# Patient Record
Sex: MALE | Race: WHITE | NOT HISPANIC OR LATINO | Employment: OTHER | URBAN - METROPOLITAN AREA
[De-identification: names, ages, dates, MRNs, and addresses within clinical notes are randomized per-mention and may not be internally consistent; named-entity substitution may affect disease eponyms.]

---

## 2022-03-16 ENCOUNTER — TELEPHONE (OUTPATIENT)
Dept: MEDICAL GROUP | Facility: MEDICAL CENTER | Age: 67
End: 2022-03-16

## 2022-04-03 SDOH — HEALTH STABILITY: MENTAL HEALTH
STRESS IS WHEN SOMEONE FEELS TENSE, NERVOUS, ANXIOUS, OR CAN'T SLEEP AT NIGHT BECAUSE THEIR MIND IS TROUBLED. HOW STRESSED ARE YOU?: NOT AT ALL

## 2022-04-03 SDOH — HEALTH STABILITY: PHYSICAL HEALTH: ON AVERAGE, HOW MANY DAYS PER WEEK DO YOU ENGAGE IN MODERATE TO STRENUOUS EXERCISE (LIKE A BRISK WALK)?: 5 DAYS

## 2022-04-03 SDOH — HEALTH STABILITY: PHYSICAL HEALTH: ON AVERAGE, HOW MANY MINUTES DO YOU ENGAGE IN EXERCISE AT THIS LEVEL?: 40 MIN

## 2022-04-03 SDOH — ECONOMIC STABILITY: HOUSING INSECURITY: IN THE LAST 12 MONTHS, HOW MANY PLACES HAVE YOU LIVED?: 2

## 2022-04-03 SDOH — ECONOMIC STABILITY: FOOD INSECURITY: WITHIN THE PAST 12 MONTHS, THE FOOD YOU BOUGHT JUST DIDN'T LAST AND YOU DIDN'T HAVE MONEY TO GET MORE.: NEVER TRUE

## 2022-04-03 SDOH — ECONOMIC STABILITY: HOUSING INSECURITY
IN THE LAST 12 MONTHS, WAS THERE A TIME WHEN YOU DID NOT HAVE A STEADY PLACE TO SLEEP OR SLEPT IN A SHELTER (INCLUDING NOW)?: NO

## 2022-04-03 SDOH — ECONOMIC STABILITY: INCOME INSECURITY: IN THE LAST 12 MONTHS, WAS THERE A TIME WHEN YOU WERE NOT ABLE TO PAY THE MORTGAGE OR RENT ON TIME?: NO

## 2022-04-03 SDOH — ECONOMIC STABILITY: TRANSPORTATION INSECURITY
IN THE PAST 12 MONTHS, HAS LACK OF RELIABLE TRANSPORTATION KEPT YOU FROM MEDICAL APPOINTMENTS, MEETINGS, WORK OR FROM GETTING THINGS NEEDED FOR DAILY LIVING?: NO

## 2022-04-03 SDOH — ECONOMIC STABILITY: TRANSPORTATION INSECURITY
IN THE PAST 12 MONTHS, HAS THE LACK OF TRANSPORTATION KEPT YOU FROM MEDICAL APPOINTMENTS OR FROM GETTING MEDICATIONS?: NO

## 2022-04-03 SDOH — ECONOMIC STABILITY: FOOD INSECURITY: WITHIN THE PAST 12 MONTHS, YOU WORRIED THAT YOUR FOOD WOULD RUN OUT BEFORE YOU GOT MONEY TO BUY MORE.: NEVER TRUE

## 2022-04-03 SDOH — ECONOMIC STABILITY: TRANSPORTATION INSECURITY
IN THE PAST 12 MONTHS, HAS LACK OF TRANSPORTATION KEPT YOU FROM MEETINGS, WORK, OR FROM GETTING THINGS NEEDED FOR DAILY LIVING?: NO

## 2022-04-03 SDOH — ECONOMIC STABILITY: INCOME INSECURITY: HOW HARD IS IT FOR YOU TO PAY FOR THE VERY BASICS LIKE FOOD, HOUSING, MEDICAL CARE, AND HEATING?: NOT HARD AT ALL

## 2022-04-03 ASSESSMENT — SOCIAL DETERMINANTS OF HEALTH (SDOH)
HOW OFTEN DO YOU ATTENT MEETINGS OF THE CLUB OR ORGANIZATION YOU BELONG TO?: NEVER
HOW OFTEN DO YOU GET TOGETHER WITH FRIENDS OR RELATIVES?: ONCE A WEEK
HOW OFTEN DO YOU ATTEND CHURCH OR RELIGIOUS SERVICES?: NEVER
HOW MANY DRINKS CONTAINING ALCOHOL DO YOU HAVE ON A TYPICAL DAY WHEN YOU ARE DRINKING: 1 OR 2
HOW OFTEN DO YOU ATTENT MEETINGS OF THE CLUB OR ORGANIZATION YOU BELONG TO?: NEVER
HOW OFTEN DO YOU HAVE A DRINK CONTAINING ALCOHOL: 2-4 TIMES A MONTH
IN A TYPICAL WEEK, HOW MANY TIMES DO YOU TALK ON THE PHONE WITH FAMILY, FRIENDS, OR NEIGHBORS?: MORE THAN THREE TIMES A WEEK
DO YOU BELONG TO ANY CLUBS OR ORGANIZATIONS SUCH AS CHURCH GROUPS UNIONS, FRATERNAL OR ATHLETIC GROUPS, OR SCHOOL GROUPS?: NO
HOW OFTEN DO YOU ATTEND CHURCH OR RELIGIOUS SERVICES?: NEVER
DO YOU BELONG TO ANY CLUBS OR ORGANIZATIONS SUCH AS CHURCH GROUPS UNIONS, FRATERNAL OR ATHLETIC GROUPS, OR SCHOOL GROUPS?: NO
HOW OFTEN DO YOU HAVE SIX OR MORE DRINKS ON ONE OCCASION: LESS THAN MONTHLY
IN A TYPICAL WEEK, HOW MANY TIMES DO YOU TALK ON THE PHONE WITH FAMILY, FRIENDS, OR NEIGHBORS?: MORE THAN THREE TIMES A WEEK
HOW OFTEN DO YOU GET TOGETHER WITH FRIENDS OR RELATIVES?: ONCE A WEEK
HOW HARD IS IT FOR YOU TO PAY FOR THE VERY BASICS LIKE FOOD, HOUSING, MEDICAL CARE, AND HEATING?: NOT HARD AT ALL
WITHIN THE PAST 12 MONTHS, YOU WORRIED THAT YOUR FOOD WOULD RUN OUT BEFORE YOU GOT THE MONEY TO BUY MORE: NEVER TRUE

## 2022-04-03 ASSESSMENT — LIFESTYLE VARIABLES
HOW OFTEN DO YOU HAVE SIX OR MORE DRINKS ON ONE OCCASION: LESS THAN MONTHLY
HOW MANY STANDARD DRINKS CONTAINING ALCOHOL DO YOU HAVE ON A TYPICAL DAY: 1 OR 2
HOW OFTEN DO YOU HAVE A DRINK CONTAINING ALCOHOL: 2-4 TIMES A MONTH

## 2022-04-06 ENCOUNTER — OFFICE VISIT (OUTPATIENT)
Dept: MEDICAL GROUP | Facility: MEDICAL CENTER | Age: 67
End: 2022-04-06
Payer: MEDICARE

## 2022-04-06 VITALS
HEART RATE: 61 BPM | DIASTOLIC BLOOD PRESSURE: 78 MMHG | HEIGHT: 71 IN | RESPIRATION RATE: 16 BRPM | BODY MASS INDEX: 28.09 KG/M2 | OXYGEN SATURATION: 96 % | SYSTOLIC BLOOD PRESSURE: 126 MMHG | WEIGHT: 200.62 LBS | TEMPERATURE: 97.7 F

## 2022-04-06 DIAGNOSIS — Z12.5 SCREENING FOR PROSTATE CANCER: ICD-10-CM

## 2022-04-06 DIAGNOSIS — G47.33 OBSTRUCTIVE SLEEP APNEA SYNDROME: ICD-10-CM

## 2022-04-06 DIAGNOSIS — F41.1 GAD (GENERALIZED ANXIETY DISORDER): ICD-10-CM

## 2022-04-06 DIAGNOSIS — R91.8 LUNG NODULES: ICD-10-CM

## 2022-04-06 DIAGNOSIS — I10 ESSENTIAL HYPERTENSION: ICD-10-CM

## 2022-04-06 DIAGNOSIS — D36.9 TUBULAR ADENOMA: ICD-10-CM

## 2022-04-06 DIAGNOSIS — Z85.828 HISTORY OF BASAL CELL CARCINOMA: ICD-10-CM

## 2022-04-06 DIAGNOSIS — K21.9 GASTROESOPHAGEAL REFLUX DISEASE WITHOUT ESOPHAGITIS: ICD-10-CM

## 2022-04-06 DIAGNOSIS — E11.9 TYPE 2 DIABETES MELLITUS WITHOUT COMPLICATION, WITHOUT LONG-TERM CURRENT USE OF INSULIN (HCC): ICD-10-CM

## 2022-04-06 DIAGNOSIS — Z11.59 NEED FOR HEPATITIS C SCREENING TEST: ICD-10-CM

## 2022-04-06 PROCEDURE — 99204 OFFICE O/P NEW MOD 45 MIN: CPT | Performed by: FAMILY MEDICINE

## 2022-04-06 RX ORDER — AMLODIPINE BESYLATE 5 MG/1
TABLET ORAL
COMMUNITY
End: 2022-04-22 | Stop reason: SDUPTHER

## 2022-04-06 RX ORDER — METOPROLOL SUCCINATE 50 MG/1
TABLET, EXTENDED RELEASE ORAL
COMMUNITY
Start: 2022-01-21 | End: 2022-04-22 | Stop reason: SDUPTHER

## 2022-04-06 RX ORDER — HYDROCORTISONE AND ACETIC ACID 20.75; 10.375 MG/ML; MG/ML
2 SOLUTION AURICULAR (OTIC) 2 TIMES DAILY
COMMUNITY
End: 2022-04-06

## 2022-04-06 RX ORDER — LISINOPRIL 40 MG/1
TABLET ORAL
COMMUNITY
Start: 2022-01-21 | End: 2022-04-22 | Stop reason: SDUPTHER

## 2022-04-06 RX ORDER — OMEPRAZOLE 20 MG/1
CAPSULE, DELAYED RELEASE ORAL
COMMUNITY
Start: 2022-01-21 | End: 2022-04-22 | Stop reason: SDUPTHER

## 2022-04-06 RX ORDER — SOY ISOFLAVONE 40 MG
1000 TABLET ORAL
COMMUNITY

## 2022-04-06 ASSESSMENT — ENCOUNTER SYMPTOMS
NAUSEA: 0
PALPITATIONS: 0
NERVOUS/ANXIOUS: 0
SENSORY CHANGE: 0
SPUTUM PRODUCTION: 0
WEIGHT LOSS: 0
SHORTNESS OF BREATH: 0
HEADACHES: 0
ABDOMINAL PAIN: 0
FOCAL WEAKNESS: 0
CONSTIPATION: 0
DIZZINESS: 0
VOMITING: 0
FEVER: 0
DIARRHEA: 0
HEMOPTYSIS: 0
MYALGIAS: 0
CHILLS: 0
COUGH: 0
DEPRESSION: 0
WHEEZING: 0

## 2022-04-06 ASSESSMENT — PATIENT HEALTH QUESTIONNAIRE - PHQ9: CLINICAL INTERPRETATION OF PHQ2 SCORE: 0

## 2022-04-06 NOTE — ASSESSMENT & PLAN NOTE
Chronic health problem, stable, recommended to start tapering off omeprazole.  Discussed side effects of PPIs.  Patient.  If symptoms recur then we can restart him on PPIs.  Can take H2 blockers like Pepcid also.

## 2022-04-06 NOTE — ASSESSMENT & PLAN NOTE
Request records from previous provider regarding CT scans, will order neck CT in August.  Counseled regarding smoking cessation.  Discussed about nicotine patches.  Will let me know when he is ready to quit smoking then will prescribe nicotine patches.  Counseled regarding changing habits associated with smoking cessation.  Patient reports understanding.

## 2022-04-06 NOTE — ASSESSMENT & PLAN NOTE
Chronic health problem, recheck labs to assess control.  Continue Metformin 500 mg twice daily.  We will get his labs and will start him on statin depending upon his lab results.  Follow-up with ophthalmology for eye exam and severe glasses also and has not been checked recently for eye exam.  Foot exam done today, normal.

## 2022-04-06 NOTE — PROGRESS NOTES
FAMILY MEDICINE VISIT                                                               Chief complaint::Diagnoses of Essential hypertension, Type 2 diabetes mellitus without complication, without long-term current use of insulin (HCC), Gastroesophageal reflux disease without esophagitis, CECILIA (generalized anxiety disorder), Need for hepatitis C screening test, Tubular adenoma, Lung nodules, History of basal cell carcinoma, Obstructive sleep apnea syndrome, and Screening for prostate cancer were pertinent to this visit.    History of present illness: Raf Spear is a 66 y.o. male, a very pleasant patient who presented to establish care.    Problem   Essential Hypertension    Has history of hypertension, currently on lisinopril 40 mg daily, Lodine 5 mg daily and metoprolol 50 mg daily.  Blood pressure was initially elevated today, repeat came back at 126/78.  Doing well with these medications.  No chest pain, palpitations, shortness of breath, lower extremity swelling.     Type 2 Diabetes Mellitus Without Complication, Without Long-Term Current Use of Insulin (Hcc)    Recently diagnosed with diabetes per patient.  A1c at 6.7.  On Metformin 500 mg twice daily.    Monofilament testing with a 10 gram force: sensation intact: intact bilaterally  Visual Inspection: Feet without maceration, ulcers, fissures.  Pedal pulses: intact bilaterally     Gastroesophageal Reflux Disease Without Esophagitis    Has history of reflux symptoms on omeprazole for 10 years.  Reports that have never tried to taper off this medication.     Cecilia (Generalized Anxiety Disorder)    Is currently on Zoloft 50 mg daily, doing well with this medication.  No side effects.     Tubular Adenoma    Recent colonoscopy in March 2022 showed 5 polyps, 3 of them were tubular adenoma, repeat recommended in 3 years     Lung Nodules    Reports that he has lung nodules, get CT scan in August every year.  Will request records from previous provider regarding these  imagings.  He smokes 4 to 5 cigarettes a day and has been smoking for 45 years.  He tried previously Wellbutrin.  He was prescribed Chantix, reports that has not taken that medication.     History of Basal Cell Carcinoma    Reports history of basal cell carcinoma over face, seeing dermatology currently.     Obstructive Sleep Apnea Syndrome    Has obstructive sleep apnea, uses CPAP machine, doing well       Review of systems:     Review of Systems   Constitutional: Negative for chills, fever, malaise/fatigue and weight loss.   HENT: Positive for congestion and hearing loss. Negative for ear discharge and ear pain.    Respiratory: Negative for cough, hemoptysis, sputum production, shortness of breath and wheezing.    Cardiovascular: Negative for chest pain, palpitations and leg swelling.   Gastrointestinal: Negative for abdominal pain, constipation, diarrhea, nausea and vomiting.   Musculoskeletal: Negative for joint pain and myalgias.   Skin: Negative for rash.   Neurological: Negative for dizziness, sensory change, focal weakness and headaches.   Psychiatric/Behavioral: Negative for depression. The patient is not nervous/anxious.         Medications and Allergies:     Current Outpatient Medications   Medication Sig Dispense Refill   • amLODIPine (NORVASC) 5 MG Tab      • lisinopril (PRINIVIL) 40 MG tablet TAKE 1 TABLET BY MOUTH DAILY FOR HIGH BLOOD PRESSURE DISORDER     • metFORMIN (GLUCOPHAGE) 500 MG Tab TAKE 1 TABLET BY MOUTH TWICE DAILY WITH BREAKFAST AND DINNER GENERIC EQUIVALENT FOR GLUCOPHAGE     • metoprolol SR (TOPROL XL) 50 MG TABLET SR 24 HR TAKE 1 TABLET BY MOUTH DAILY FOR HIGH BLOOD PRESSURE DISORDER     • omeprazole (PRILOSEC) 20 MG delayed-release capsule TAKE 1 CAPSULE BY MOUTH DAILY FOR GASTROESOPHAGEAL REFLUX DISEASE     • sertraline (ZOLOFT) 50 MG Tab      • ASPIRIN LOW DOSE PO Take 5 mg by mouth.     • Methylsulfonylmethane (MSM) 1000 MG Cap Take 1,000 mg by mouth.     • Multiple Vitamin  "(MULTI-VITAMIN DAILY PO) Take  by mouth.       No current facility-administered medications for this visit.          Vitals:    /78   Pulse 61   Temp 36.5 °C (97.7 °F)   Resp 16   Ht 1.803 m (5' 11\")   Wt 91 kg (200 lb 9.9 oz)   SpO2 96%  Body mass index is 27.98 kg/m².    Physical Exam:     Physical Exam  Constitutional:       General: He is not in acute distress.  HENT:      Head: Normocephalic and atraumatic.      Right Ear: External ear normal.      Left Ear: External ear normal.      Ears:      Comments: Wearing hearing aids  Eyes:      Conjunctiva/sclera: Conjunctivae normal.   Cardiovascular:      Rate and Rhythm: Normal rate and regular rhythm.      Heart sounds: Normal heart sounds. No murmur heard.    No friction rub. No gallop.   Pulmonary:      Effort: Pulmonary effort is normal. No respiratory distress.      Breath sounds: Normal breath sounds. No wheezing or rales.   Musculoskeletal:         General: No deformity.      Cervical back: Neck supple.   Neurological:      Mental Status: He is alert.      Gait: Gait is intact.   Psychiatric:         Mood and Affect: Mood and affect normal.         Judgment: Judgment normal.            Assessment/Plan:    Problem List Items Addressed This Visit     Essential hypertension     Chronic health problem, stable, continue same medication regimen.         Relevant Medications    amLODIPine (NORVASC) 5 MG Tab    lisinopril (PRINIVIL) 40 MG tablet    metoprolol SR (TOPROL XL) 50 MG TABLET SR 24 HR    Other Relevant Orders    Comp Metabolic Panel    CBC WITH DIFFERENTIAL    CECILIA (generalized anxiety disorder)     Chronic health problem, stable, continue same medication regimen.         Relevant Medications    sertraline (ZOLOFT) 50 MG Tab    Gastroesophageal reflux disease without esophagitis     Chronic health problem, stable, recommended to start tapering off omeprazole.  Discussed side effects of PPIs.  Patient.  If symptoms recur then we can restart him " on PPIs.  Can take H2 blockers like Pepcid also.         Relevant Medications    omeprazole (PRILOSEC) 20 MG delayed-release capsule    History of basal cell carcinoma     Continue to follow-up with dermatology for follow-ups         Lung nodules     Request records from previous provider regarding CT scans, will order neck CT in August.  Counseled regarding smoking cessation.  Discussed about nicotine patches.  Will let me know when he is ready to quit smoking then will prescribe nicotine patches.  Counseled regarding changing habits associated with smoking cessation.  Patient reports understanding.         Obstructive sleep apnea syndrome     Continue CPAP machine         Tubular adenoma     Follow-up with gastroenterology for repeat colonoscopy in 3 years         Type 2 diabetes mellitus without complication, without long-term current use of insulin (HCC)     Chronic health problem, recheck labs to assess control.  Continue Metformin 500 mg twice daily.  We will get his labs and will start him on statin depending upon his lab results.  Follow-up with ophthalmology for eye exam and severe glasses also and has not been checked recently for eye exam.  Foot exam done today, normal.         Relevant Medications    metFORMIN (GLUCOPHAGE) 500 MG Tab    Other Relevant Orders    HEMOGLOBIN A1C    Lipid Profile    MICROALBUMIN CREAT RATIO URINE    Referral to Ophthalmology    VITAMIN B12      Other Visit Diagnoses     Need for hepatitis C screening test        Relevant Orders    HEP C VIRUS ANTIBODY    Screening for prostate cancer        Relevant Orders    PROSTATE SPECIFIC AG SCREENING           Please note that this dictation was created using voice recognition software. I have made every reasonable attempt to correct obvious errors, but I expect that there are errors of grammar and possibly content that I did not discover before finalizing the note.    Follow up in 3 months for annual wellness visit and lab  follow-up.

## 2022-04-22 ENCOUNTER — PATIENT MESSAGE (OUTPATIENT)
Dept: MEDICAL GROUP | Facility: MEDICAL CENTER | Age: 67
End: 2022-04-22
Payer: MEDICARE

## 2022-04-22 RX ORDER — OMEPRAZOLE 20 MG/1
CAPSULE, DELAYED RELEASE ORAL
Qty: 90 CAPSULE | Refills: 3 | Status: SHIPPED | OUTPATIENT
Start: 2022-04-22 | End: 2023-02-03

## 2022-04-22 RX ORDER — METOPROLOL SUCCINATE 50 MG/1
TABLET, EXTENDED RELEASE ORAL
Qty: 90 TABLET | Refills: 3 | Status: SHIPPED | OUTPATIENT
Start: 2022-04-22 | End: 2023-02-03

## 2022-04-22 RX ORDER — LISINOPRIL 40 MG/1
TABLET ORAL
Qty: 90 TABLET | Refills: 3 | Status: SHIPPED | OUTPATIENT
Start: 2022-04-22 | End: 2022-11-11

## 2022-04-22 RX ORDER — AMLODIPINE BESYLATE 5 MG/1
5 TABLET ORAL DAILY
Qty: 90 TABLET | Refills: 3 | Status: SHIPPED | OUTPATIENT
Start: 2022-04-22 | End: 2023-02-03

## 2022-05-11 ENCOUNTER — PATIENT MESSAGE (OUTPATIENT)
Dept: MEDICAL GROUP | Facility: MEDICAL CENTER | Age: 67
End: 2022-05-11
Payer: MEDICARE

## 2022-05-11 DIAGNOSIS — G47.33 OBSTRUCTIVE SLEEP APNEA SYNDROME: ICD-10-CM

## 2022-06-28 ENCOUNTER — HOSPITAL ENCOUNTER (OUTPATIENT)
Dept: LAB | Facility: MEDICAL CENTER | Age: 67
End: 2022-06-28
Attending: FAMILY MEDICINE
Payer: MEDICARE

## 2022-06-28 DIAGNOSIS — E11.9 TYPE 2 DIABETES MELLITUS WITHOUT COMPLICATION, WITHOUT LONG-TERM CURRENT USE OF INSULIN (HCC): ICD-10-CM

## 2022-06-28 DIAGNOSIS — Z11.59 NEED FOR HEPATITIS C SCREENING TEST: ICD-10-CM

## 2022-06-28 DIAGNOSIS — Z12.5 SCREENING FOR PROSTATE CANCER: ICD-10-CM

## 2022-06-28 DIAGNOSIS — I10 ESSENTIAL HYPERTENSION: ICD-10-CM

## 2022-06-28 LAB
ALBUMIN SERPL BCP-MCNC: 4.6 G/DL (ref 3.2–4.9)
ALBUMIN/GLOB SERPL: 1.6 G/DL
ALP SERPL-CCNC: 66 U/L (ref 30–99)
ALT SERPL-CCNC: 70 U/L (ref 2–50)
ANION GAP SERPL CALC-SCNC: 11 MMOL/L (ref 7–16)
AST SERPL-CCNC: 30 U/L (ref 12–45)
BASOPHILS # BLD AUTO: 1.1 % (ref 0–1.8)
BASOPHILS # BLD: 0.08 K/UL (ref 0–0.12)
BILIRUB SERPL-MCNC: 0.3 MG/DL (ref 0.1–1.5)
BUN SERPL-MCNC: 21 MG/DL (ref 8–22)
CALCIUM SERPL-MCNC: 9.5 MG/DL (ref 8.5–10.5)
CHLORIDE SERPL-SCNC: 103 MMOL/L (ref 96–112)
CHOLEST SERPL-MCNC: 114 MG/DL (ref 100–199)
CO2 SERPL-SCNC: 25 MMOL/L (ref 20–33)
CREAT SERPL-MCNC: 0.82 MG/DL (ref 0.5–1.4)
CREAT UR-MCNC: 158.31 MG/DL
EOSINOPHIL # BLD AUTO: 0.23 K/UL (ref 0–0.51)
EOSINOPHIL NFR BLD: 3.2 % (ref 0–6.9)
ERYTHROCYTE [DISTWIDTH] IN BLOOD BY AUTOMATED COUNT: 48.2 FL (ref 35.9–50)
EST. AVERAGE GLUCOSE BLD GHB EST-MCNC: 146 MG/DL
FASTING STATUS PATIENT QL REPORTED: NORMAL
GFR SERPLBLD CREATININE-BSD FMLA CKD-EPI: 95 ML/MIN/1.73 M 2
GLOBULIN SER CALC-MCNC: 2.8 G/DL (ref 1.9–3.5)
GLUCOSE SERPL-MCNC: 128 MG/DL (ref 65–99)
HBA1C MFR BLD: 6.7 % (ref 4–5.6)
HCT VFR BLD AUTO: 44.1 % (ref 42–52)
HCV AB SER QL: NORMAL
HDLC SERPL-MCNC: 33 MG/DL
HGB BLD-MCNC: 13.3 G/DL (ref 14–18)
IMM GRANULOCYTES # BLD AUTO: 0.02 K/UL (ref 0–0.11)
IMM GRANULOCYTES NFR BLD AUTO: 0.3 % (ref 0–0.9)
LDLC SERPL CALC-MCNC: 52 MG/DL
LYMPHOCYTES # BLD AUTO: 1.24 K/UL (ref 1–4.8)
LYMPHOCYTES NFR BLD: 17.2 % (ref 22–41)
MCH RBC QN AUTO: 22.9 PG (ref 27–33)
MCHC RBC AUTO-ENTMCNC: 30.2 G/DL (ref 33.7–35.3)
MCV RBC AUTO: 75.9 FL (ref 81.4–97.8)
MICROALBUMIN UR-MCNC: 151.5 MG/DL
MICROALBUMIN/CREAT UR: 957 MG/G (ref 0–30)
MONOCYTES # BLD AUTO: 0.77 K/UL (ref 0–0.85)
MONOCYTES NFR BLD AUTO: 10.7 % (ref 0–13.4)
NEUTROPHILS # BLD AUTO: 4.86 K/UL (ref 1.82–7.42)
NEUTROPHILS NFR BLD: 67.5 % (ref 44–72)
NRBC # BLD AUTO: 0 K/UL
NRBC BLD-RTO: 0 /100 WBC
PLATELET # BLD AUTO: 299 K/UL (ref 164–446)
PMV BLD AUTO: 10.2 FL (ref 9–12.9)
POTASSIUM SERPL-SCNC: 4.6 MMOL/L (ref 3.6–5.5)
PROT SERPL-MCNC: 7.4 G/DL (ref 6–8.2)
PSA SERPL-MCNC: 2.23 NG/ML (ref 0–4)
RBC # BLD AUTO: 5.81 M/UL (ref 4.7–6.1)
SODIUM SERPL-SCNC: 139 MMOL/L (ref 135–145)
TRIGL SERPL-MCNC: 145 MG/DL (ref 0–149)
VIT B12 SERPL-MCNC: 822 PG/ML (ref 211–911)
WBC # BLD AUTO: 7.2 K/UL (ref 4.8–10.8)

## 2022-06-28 PROCEDURE — 82607 VITAMIN B-12: CPT

## 2022-06-28 PROCEDURE — 36415 COLL VENOUS BLD VENIPUNCTURE: CPT

## 2022-06-28 PROCEDURE — 86803 HEPATITIS C AB TEST: CPT

## 2022-06-28 PROCEDURE — 80061 LIPID PANEL: CPT

## 2022-06-28 PROCEDURE — 82043 UR ALBUMIN QUANTITATIVE: CPT

## 2022-06-28 PROCEDURE — 83036 HEMOGLOBIN GLYCOSYLATED A1C: CPT

## 2022-06-28 PROCEDURE — 82570 ASSAY OF URINE CREATININE: CPT

## 2022-06-28 PROCEDURE — 85025 COMPLETE CBC W/AUTO DIFF WBC: CPT

## 2022-06-28 PROCEDURE — 84153 ASSAY OF PSA TOTAL: CPT

## 2022-06-28 PROCEDURE — 80053 COMPREHEN METABOLIC PANEL: CPT

## 2022-07-06 ENCOUNTER — PATIENT MESSAGE (OUTPATIENT)
Dept: MEDICAL GROUP | Facility: MEDICAL CENTER | Age: 67
End: 2022-07-06

## 2022-07-06 ENCOUNTER — APPOINTMENT (OUTPATIENT)
Dept: MEDICAL GROUP | Facility: MEDICAL CENTER | Age: 67
End: 2022-07-06
Payer: MEDICARE

## 2022-07-19 ENCOUNTER — OFFICE VISIT (OUTPATIENT)
Dept: MEDICAL GROUP | Facility: MEDICAL CENTER | Age: 67
End: 2022-07-19
Payer: MEDICARE

## 2022-07-19 VITALS
DIASTOLIC BLOOD PRESSURE: 70 MMHG | RESPIRATION RATE: 16 BRPM | HEART RATE: 70 BPM | TEMPERATURE: 97.7 F | WEIGHT: 196.21 LBS | HEIGHT: 71 IN | SYSTOLIC BLOOD PRESSURE: 130 MMHG | OXYGEN SATURATION: 97 % | BODY MASS INDEX: 27.47 KG/M2

## 2022-07-19 DIAGNOSIS — I10 ESSENTIAL HYPERTENSION: ICD-10-CM

## 2022-07-19 DIAGNOSIS — E11.9 TYPE 2 DIABETES MELLITUS WITHOUT COMPLICATION, WITHOUT LONG-TERM CURRENT USE OF INSULIN (HCC): ICD-10-CM

## 2022-07-19 DIAGNOSIS — D50.9 MICROCYTIC ANEMIA: ICD-10-CM

## 2022-07-19 DIAGNOSIS — Z13.6 SCREENING FOR AAA (ABDOMINAL AORTIC ANEURYSM): ICD-10-CM

## 2022-07-19 DIAGNOSIS — F41.1 GAD (GENERALIZED ANXIETY DISORDER): ICD-10-CM

## 2022-07-19 DIAGNOSIS — R91.8 LUNG NODULES: ICD-10-CM

## 2022-07-19 DIAGNOSIS — H91.93 BILATERAL HEARING LOSS, UNSPECIFIED HEARING LOSS TYPE: ICD-10-CM

## 2022-07-19 DIAGNOSIS — C44.311 BASAL CELL CARCINOMA (BCC) OF SKIN OF NOSE: ICD-10-CM

## 2022-07-19 DIAGNOSIS — E11.29 TYPE 2 DIABETES MELLITUS WITH MICROALBUMINURIA, WITHOUT LONG-TERM CURRENT USE OF INSULIN (HCC): ICD-10-CM

## 2022-07-19 DIAGNOSIS — F17.200 CURRENT SMOKER: ICD-10-CM

## 2022-07-19 DIAGNOSIS — R80.9 TYPE 2 DIABETES MELLITUS WITH MICROALBUMINURIA, WITHOUT LONG-TERM CURRENT USE OF INSULIN (HCC): ICD-10-CM

## 2022-07-19 PROBLEM — K57.30 COLON, DIVERTICULOSIS: Status: ACTIVE | Noted: 2018-02-15

## 2022-07-19 PROBLEM — Z85.828 HISTORY OF BASAL CELL CARCINOMA: Status: RESOLVED | Noted: 2022-04-06 | Resolved: 2022-07-19

## 2022-07-19 PROBLEM — C44.91 BCC (BASAL CELL CARCINOMA OF SKIN): Status: ACTIVE | Noted: 2022-07-19

## 2022-07-19 PROBLEM — K80.20 CALCULUS OF GALLBLADDER WITHOUT CHOLECYSTITIS WITHOUT OBSTRUCTION: Status: ACTIVE | Noted: 2017-11-30

## 2022-07-19 PROCEDURE — 99214 OFFICE O/P EST MOD 30 MIN: CPT | Performed by: FAMILY MEDICINE

## 2022-07-19 RX ORDER — VITAMIN B COMPLEX
1000 TABLET ORAL DAILY
COMMUNITY

## 2022-07-19 RX ORDER — SODIUM PHOSPHATE,MONO-DIBASIC 19G-7G/118
500 ENEMA (ML) RECTAL
COMMUNITY

## 2022-07-19 RX ORDER — FERROUS SULFATE 325(65) MG
325 TABLET ORAL DAILY
Qty: 90 TABLET | Refills: 1 | Status: SHIPPED | OUTPATIENT
Start: 2022-07-19 | End: 2022-10-17

## 2022-07-19 RX ORDER — CHLORAL HYDRATE 500 MG
1000 CAPSULE ORAL
COMMUNITY

## 2022-07-19 ASSESSMENT — ENCOUNTER SYMPTOMS
CHILLS: 0
CONSTIPATION: 0
BLOOD IN STOOL: 0
HEARTBURN: 0
ABDOMINAL PAIN: 0
PALPITATIONS: 0
DIARRHEA: 0
VOMITING: 0
NAUSEA: 0
FEVER: 0

## 2022-07-19 ASSESSMENT — FIBROSIS 4 INDEX: FIB4 SCORE: 0.83

## 2022-07-19 NOTE — PROGRESS NOTES
FAMILY MEDICINE VISIT                                                               Chief complaint::Diagnoses of Lung nodules, Type 2 diabetes mellitus without complication, without long-term current use of insulin (HCC), Essential hypertension, Type 2 diabetes mellitus with microalbuminuria, without long-term current use of insulin (HCC), Basal cell carcinoma (BCC) of skin of nose, Current smoker, Screening for AAA (abdominal aortic aneurysm), Bilateral hearing loss, unspecified hearing loss type, CECILIA (generalized anxiety disorder), and Microcytic anemia were pertinent to this visit.    History of present illness: Raf Spear is a 67 y.o. male who presented for lab follow-up.    Problem   Bcc (Basal Cell Carcinoma of Skin)    Has history of basal cell carcinoma, was previously followed with dermatology every 6 months     Bilateral Hearing Loss    He wears hearing aids, reports that he still cannot hear properly and he has to raise volume of hearing aids, would like to get checked     Microcytic Anemia    Recent labs showed hemoglobin low as well as other counts low indicated of microcytic anemia.  His last colonoscopy was in March 2022 he had 5 polyps and repeat recommended in 3 years.  He reports that he feels tired and fatigue.  He denies any blood in stools, blood in urine     Essential Hypertension    Has history of hypertension, currently on lisinopril 40 mg daily, amlodipine 5 mg daily and metoprolol 50 mg daily.  Blood pressure was initially elevated today, repeat came back at 130/70 doing well with these medications.  No chest pain, palpitations, shortness of breath, lower extremity swelling.     Type 2 Diabetes Mellitus With Microalbuminuria, Without Long-Term Current Use of Insulin ([facility] [place])    Recently diagnosed with diabetes per patient.  A1c at 6.7.  On Metformin 500 mg twice daily.  Scheduled with ophthalmology in August.    Lab Results   Component Value Date/Time    HBA1C 6.7 (H) 06/28/2022 09:39 AM          Abdon (Generalized Anxiety Disorder)    Is currently on Zoloft 50 mg daily, doing well with this medication.  No side effects.     Lung Nodules    CT chest on 09/2021 showed Few groundglass opacities up to 10 mm better seen. Noncontrast chest CT in 6 months recommended. Solid pulmonary nodules of less than 6 mm not significantly changed as above. Few areas of mild mucus plugging    He smokes 4 to 5 cigarettes a day and has been smoking for 45 years.  He tried previously Wellbutrin.  He was prescribed Chantix, reports that has not taken that medication.  He reports that he read that Chantix is associated with pancreatic cancer.     Colon, Diverticulosis    Formatting of this note might be different from the original.  Mild-moderate sigmoid colon.     Calculus of Gallbladder Without Cholecystitis Without Obstruction   Current Smoker    He smokes 4 to 5 cigarettes a day and has been smoking for 45 years.  He tried previously Wellbutrin.  He was prescribed Chantix, reports that has not taken that medication.  He reports that he read that Chantix is associated with pancreatic cancer.     History of Basal Cell Carcinoma (Resolved)    Reports history of basal cell carcinoma over face, seeing dermatology currently.           Review of systems:     Review of Systems   Constitutional: Positive for malaise/fatigue. Negative for chills and fever.   Cardiovascular: Negative for chest pain, palpitations and leg swelling.   Gastrointestinal: Negative for abdominal pain, blood in stool, constipation, diarrhea, heartburn, nausea and vomiting.        Medications and Allergies:     Current Outpatient Medications   Medication Sig Dispense Refill   • Omega-3 Fatty Acids (FISH OIL) 1000 MG Cap capsule Take 1,000 mg by mouth 3 times a day with meals.     • Zinc Sulfate (ZINC-220 PO) Take  by mouth.     • Ascorbic Acid (VITAMIN C CR) 1000 MG Tab CR Take  by mouth.     • vitamin D3 (CHOLECALCIFEROL) 1000 Unit (25 mcg) Tab Take 1,000  "Units by mouth every day.     • glucosamine Sulfate 500 MG Cap Take 500 mg by mouth 3 times a day with meals.     • metFORMIN (GLUCOPHAGE) 500 MG Tab Take 2 Tablets by mouth 2 times a day with meals. 360 Tablet 3   • sertraline (ZOLOFT) 50 MG Tab Take 1 Tablet by mouth every day. 90 Tablet 3   • ferrous sulfate 325 (65 Fe) MG tablet Take 1 Tablet by mouth every day. 90 Tablet 1   • varenicline (CHANTIX CYN) 0.5 MG X 11 & 1 MG X 42 tablet Take 0.5 mg tablet by mouth daily for 3 days, then take 0.5 mg tablet by mouth 2 times daily from day 4 through day 7, then take 1 mg tablet by mouth twice daily 56 Each 3   • amLODIPine (NORVASC) 5 MG Tab Take 1 Tablet by mouth every day. For high blood pressure 90 Tablet 3   • lisinopril (PRINIVIL) 40 MG tablet TAKE 1 TABLET BY MOUTH DAILY FOR HIGH BLOOD PRESSURE DISORDER 90 Tablet 3   • metoprolol SR (TOPROL XL) 50 MG TABLET SR 24 HR TAKE 1 TABLET BY MOUTH DAILY FOR HIGH BLOOD PRESSURE DISORDER 90 Tablet 3   • omeprazole (PRILOSEC) 20 MG delayed-release capsule TAKE 1 CAPSULE BY MOUTH DAILY FOR GASTROESOPHAGEAL REFLUX DISEASE 90 Capsule 3   • ASPIRIN LOW DOSE PO Take 5 mg by mouth.     • Methylsulfonylmethane (MSM) 1000 MG Cap Take 1,000 mg by mouth.     • Multiple Vitamin (MULTI-VITAMIN DAILY PO) Take  by mouth.       No current facility-administered medications for this visit.          Vitals:    /70 (BP Location: Left arm, Patient Position: Sitting, BP Cuff Size: Adult)   Pulse 70   Temp 36.5 °C (97.7 °F)   Resp 16   Ht 1.803 m (5' 11\")   Wt 89 kg (196 lb 3.4 oz)   SpO2 97%  Body mass index is 27.37 kg/m².    Physical Exam:     Physical Exam  Constitutional:       General: He is not in acute distress.  HENT:      Head: Normocephalic and atraumatic.   Eyes:      Conjunctiva/sclera: Conjunctivae normal.   Cardiovascular:      Rate and Rhythm: Normal rate.   Pulmonary:      Effort: Pulmonary effort is normal. No respiratory distress.   Musculoskeletal:         " General: No deformity.      Cervical back: Neck supple.   Skin:     Findings: No rash.   Neurological:      Mental Status: He is alert.      Gait: Gait is intact.   Psychiatric:         Mood and Affect: Mood and affect normal.         Judgment: Judgment normal.          Labs:  I reviewed with patient recent labs resulted on 6/28/2022    Assessment/Plan:    Problem List Items Addressed This Visit     BCC (basal cell carcinoma of skin)     Referral to dermatology to establish care for follow-up for monitoring for basal cell carcinoma           Relevant Orders    Referral to Dermatology    Bilateral hearing loss     Chronic health problem, referral to audiology for retesting for hearing test           Relevant Orders    Referral to Audiology    Current smoker     Chronic ongoing problem, discussed with patient about smoking cessation, counseled regarding this.  He is agreeable to start medication.  Prescribed varenicline generic prescription.  Discussed Chantix is off market now           Relevant Medications    varenicline (CHANTIX CYN) 0.5 MG X 11 & 1 MG X 42 tablet    Essential hypertension     Chronic health problem, stable, continue same medication regimen.           CECILIA (generalized anxiety disorder)     Chronic health problem, stable, continue Zoloft 50 mg daily           Relevant Medications    sertraline (ZOLOFT) 50 MG Tab    Lung nodules     Chronic health problem, ordered repeat CT scan for follow-up for lung nodules.  Discussed that Chantix is off the market now.  Prescribed varenicline generic prescription           Relevant Orders    CT-CHEST, HIGH RESOLUTION LUNG    Microcytic anemia     New health problem, start iron supplementation 1 tablet daily, check CBC, iron and ferritin level with next blood work           Relevant Medications    ferrous sulfate 325 (65 Fe) MG tablet    Other Relevant Orders    CBC WITHOUT DIFFERENTIAL    IRON/TOTAL IRON BIND    FERRITIN    Type 2 diabetes mellitus with  microalbuminuria, without long-term current use of insulin (HCC)     Chronic health problem, his A1c remains at 6.7 even after starting metformin.  Increase metformin to 1000 mg twice daily.  Recheck labs with next blood work.  Has microalbuminuria on urine test, on lisinopril.  Follow-up with ophthalmology for eye exam           Relevant Medications    metFORMIN (GLUCOPHAGE) 500 MG Tab      Other Visit Diagnoses     Screening for AAA (abdominal aortic aneurysm)        Relevant Orders    US-ABDOMINAL AORTA W/O DOPPLER           Please note that this dictation was created using voice recognition software. I have made every reasonable attempt to correct obvious errors, but I expect that there are errors of grammar and possibly content that I did not discover before finalizing the note.    Follow up in 3 months for lab follow-up.

## 2022-07-19 NOTE — ASSESSMENT & PLAN NOTE
Chronic health problem, ordered repeat CT scan for follow-up for lung nodules.  Discussed that Chantix is off the market now.  Prescribed varenicline generic prescription

## 2022-07-19 NOTE — ASSESSMENT & PLAN NOTE
New health problem, start iron supplementation 1 tablet daily, check CBC, iron and ferritin level with next blood work

## 2022-07-19 NOTE — ASSESSMENT & PLAN NOTE
Chronic health problem, his A1c remains at 6.7 even after starting metformin.  Increase metformin to 1000 mg twice daily.  Recheck labs with next blood work.  Has microalbuminuria on urine test, on lisinopril.  Follow-up with ophthalmology for eye exam

## 2022-08-05 ENCOUNTER — OFFICE VISIT (OUTPATIENT)
Dept: SLEEP MEDICINE | Facility: MEDICAL CENTER | Age: 67
End: 2022-08-05
Payer: MEDICARE

## 2022-08-05 VITALS
HEIGHT: 71 IN | OXYGEN SATURATION: 95 % | WEIGHT: 202 LBS | SYSTOLIC BLOOD PRESSURE: 124 MMHG | RESPIRATION RATE: 16 BRPM | HEART RATE: 74 BPM | DIASTOLIC BLOOD PRESSURE: 84 MMHG | BODY MASS INDEX: 28.28 KG/M2

## 2022-08-05 DIAGNOSIS — G47.33 OSA ON CPAP: Primary | ICD-10-CM

## 2022-08-05 DIAGNOSIS — G47.00 FREQUENT NOCTURNAL AWAKENING: ICD-10-CM

## 2022-08-05 PROCEDURE — 99203 OFFICE O/P NEW LOW 30 MIN: CPT | Performed by: STUDENT IN AN ORGANIZED HEALTH CARE EDUCATION/TRAINING PROGRAM

## 2022-08-05 ASSESSMENT — FIBROSIS 4 INDEX: FIB4 SCORE: 0.83

## 2022-08-05 NOTE — PROGRESS NOTES
[facility] Consult Note     Date: 8/5/2022 / Time: 9:48 AM      Thank you for requesting a sleep medicine consultation on Raf Spear at the sleep center. Presents today with the chief complaints of establishing care for obstructive sleep apnea. He is referred by Ryan Carranza M.D.  [id number]  [place] [id number] for evaluation and treatment of obstructive sleep apnea on CPAP.    HISTORY OF PRESENT ILLNESS:     Raf Spear is a 67 y.o. male with hypertension, hyperlipidemia, CECILIA, GERD, and obstructive sleep apnea on CPAP.  Presents to Sleep Clinic to establish care for obstructive sleep apnea.    He was diagnosed in [facility] he PSG split-night study on 2/27/2020.  PSG showed mild obstructive sleep apnea based on 4% criteria.  Minimal oxygen saturation during diagnostic portion of 89%.  Patient responded well to CPAP therapy.  It was recommended to be on a pressure of 8 cmH2O.  Patient was placed on auto CPAP with settings at 7-13.  With starting CPAP she found that his energy level improved.  He was sleeping more soundly and had less not drowsiness during the day.  Overall he is very happy with using his CPAP and does find his mask and pressures comfortable.    He has noticed that he still becomes tired around 1:59 in the afternoon on certain days.  Otherwise he currently has no complaints or concerns.  He is hoping to be established with a new DME company since moving to the [facility].    DME provider: Current does not have one locally.   Device: Airsense 10 prescribed beginning of 2021, on Medicare  Mask: fullface   Aerophagia: No   Snoring: No   Dry mouth: No   Leak: No   Skin irritation: No   Chin strap: No       As per supplemental questionnaire to be scanned or imported into chart:    [place] Sleepiness Score: 6    Sleep Schedule  Bedtime: Weekday & Weekend 11pm  Wake time: Weekday & Weekend 7am  Sleep-onset latency: 20-30 min  Awakenings from  "sleep: 1  Difficulty falling back asleep: None   Bedroom partner: yes  Naps: Yes, once every week or two.     DAYTIME SYMPTOMS:   Excessive daytime sleepiness: No   Daytime fatigue: No   Difficulty concentrating: No   Memory problems: Yes  Irritability:No   Work/school performance issues: No   Sleepiness with driving: No   Caffeine/stimulant use: Yes 2 cups coffee   Alcohol use:Yes, How Many? Once a week      SLEEP RELATED SYMPTOMS  On CPAP.   Snoring: No   Witnessed apnea or gasping/choking: No   Dry mouth or mouth breathing: No   Sweating: No   Teeth grinding/biting: No   Morning headaches: No   Refreshed Upon Awakening: Yes     SLEEP RELATED BEHAVIORS:  Parasomnias (walking, talking, eating, violence): No   Leg kicking: No   Restless legs - \"urge to move\": No   Nightmares: No  Recurrent: No   Dream enactment: No      NARCOLEPSY:  Cataplexy: No   Sleep paralysis: No   Sleep attacks: No   Hypnagogic/hypnopompic hallucinations: No     MEDICAL HISTORY  Past Medical History:   Diagnosis Date   • Daytime sleepiness    • Diabetes (HCC)    • [place] measles    • Hyperlipidemia    • Hypertension    • Influenza    • Mumps    • Snoring    • Wears glasses         SURGICAL HISTORY  Past Surgical History:   Procedure Laterality Date   • LAMINOTOMY     • LUMBAR DECOMPRESSION     • TONSILLECTOMY          FAMILY HISTORY  Family History   Problem Relation Age of Onset   • Cancer Mother         Lymphoma Leukemia   • Hypertension Father    • Cancer Father         Colon cancer    • Colon Cancer Father        SOCIAL HISTORY  Social History     Socioeconomic History   • Marital status:    • Highest education level: Some college, no degree   Tobacco Use   • Smoking status: Current Every Day Smoker     Packs/day: 0.30     Years: 50.00     Pack years: 15.00     Types: Cigarettes     Start date: 4/1/1976   • Smokeless tobacco: Never Used   • Tobacco comment: 4-5 cig/day   Vaping Use   • Vaping Use: Never used   Substance and Sexual " Activity   • Alcohol use: Yes     Alcohol/week: 1.2 oz     Types: 2 Cans of beer per week     Comment: Social   • Drug use: Never     Social Determinants of Health     Financial Resource Strain: Low Risk    • Difficulty of Paying Living Expenses: Not hard at all   Food Insecurity: No Food Insecurity   • Worried About Running Out of Food in the Last Year: Never true   • Ran Out of Food in the Last Year: Never true   Transportation Needs: No Transportation Needs   • Lack of Transportation (Medical): No   • Lack of Transportation (Non-Medical): No   Physical Activity: Sufficiently Active   • Days of Exercise per Week: 5 days   • Minutes of Exercise per Session: 40 min   Stress: No Stress Concern Present   • Feeling of Stress : Not at all   Social Connections: Moderately Isolated   • Frequency of Communication with Friends and Family: More than three times a week   • Frequency of Social Gatherings with Friends and Family: Once a week   • Attends Mormon Services: Never   • Active Member of Clubs or Organizations: No   • Attends Club or Organization Meetings: Never   • Marital Status:    Housing Stability: Low Risk    • Unable to Pay for Housing in the Last Year: No   • Number of Places Lived in the Last Year: 2   • Unstable Housing in the Last Year: No        Occupation: Retired     CURRENT MEDICATIONS  Current Outpatient Medications   Medication Sig Dispense Refill   • Omega-3 Fatty Acids (FISH OIL) 1000 MG Cap capsule Take 1,000 mg by mouth 3 times a day with meals.     • Zinc Sulfate (ZINC-220 PO) Take  by mouth.     • Ascorbic Acid (VITAMIN C CR) 1000 MG Tab CR Take  by mouth.     • vitamin D3 (CHOLECALCIFEROL) 1000 Unit (25 mcg) Tab Take 1,000 Units by mouth every day.     • glucosamine Sulfate 500 MG Cap Take 500 mg by mouth 3 times a day with meals.     • metFORMIN (GLUCOPHAGE) 500 MG Tab Take 2 Tablets by mouth 2 times a day with meals. 360 Tablet 3   • sertraline (ZOLOFT) 50 MG Tab Take 1 Tablet by  "mouth every day. 90 Tablet 3   • ferrous sulfate 325 (65 Fe) MG tablet Take 1 Tablet by mouth every day. 90 Tablet 1   • amLODIPine (NORVASC) 5 MG Tab Take 1 Tablet by mouth every day. For high blood pressure 90 Tablet 3   • lisinopril (PRINIVIL) 40 MG tablet TAKE 1 TABLET BY MOUTH DAILY FOR HIGH BLOOD PRESSURE DISORDER 90 Tablet 3   • metoprolol SR (TOPROL XL) 50 MG TABLET SR 24 HR TAKE 1 TABLET BY MOUTH DAILY FOR HIGH BLOOD PRESSURE DISORDER 90 Tablet 3   • omeprazole (PRILOSEC) 20 MG delayed-release capsule TAKE 1 CAPSULE BY MOUTH DAILY FOR GASTROESOPHAGEAL REFLUX DISEASE 90 Capsule 3   • ASPIRIN LOW DOSE PO Take 5 mg by mouth.     • Methylsulfonylmethane (MSM) 1000 MG Cap Take 1,000 mg by mouth.     • Multiple Vitamin (MULTI-VITAMIN DAILY PO) Take  by mouth.     • varenicline (CHANTIX CYN) 0.5 MG X 11 & 1 MG X 42 tablet Take 0.5 mg tablet by mouth daily for 3 days, then take 0.5 mg tablet by mouth 2 times daily from day 4 through day 7, then take 1 mg tablet by mouth twice daily (Patient not taking: Reported on 8/5/2022) 56 Each 3     No current facility-administered medications for this visit.       REVIEW OF SYSTEMS  Constitutional: Denies fevers, Denies weight changes  Ears/Nose/Throat/Mouth: Denies nasal congestion or sore throat   Cardiovascular: Denies chest pain  Respiratory: Denies shortness of breath, Denies cough  Gastrointestinal/Hepatic: Denies nausea, vomiting  Sleep: see HPI    Physical Examination:  Vitals/ General Appearance:   Weight/BMI: Body mass index is 28.57 kg/m².  /84 (BP Location: Left arm, Patient Position: Sitting, BP Cuff Size: Adult)   Pulse 74   Resp 16   Ht 1.791 m (5' 10.5\")   Wt 91.6 kg (202 lb)   SpO2 95%   Vitals:    08/05/22 0946   BP: 124/84   BP Location: Left arm   Patient Position: Sitting   BP Cuff Size: Adult   Pulse: 74   Resp: 16   SpO2: 95%   Weight: 91.6 kg (202 lb)   Height: 1.791 m (5' 10.5\")       Pt. is alert and oriented to time, place and person. " Cooperative and in no apparent distress.     Constitutional: Alert, no distress, well-groomed.  Skin: No rashes in visible areas.  Eye: Round. Conjunctiva clear, lids normal. No icterus.   ENT EXAM  Nasal alae/valves collapsible: No   Nasal septum deviation: Yes  Nasal turbinate hypertrophy: Left: Grade 1   Right: Grade 1  Hard palate narrow: No   Hard palate high: No   Soft palate/uvula (Mallampati score): 3  Tongue Scalloping: Yes  Retrognathia: No   Micrognathia: No   Cardiovascular:no murmus/gallops/rubs, normal S1 and S2 heart sounds, regular rate and rhythm  Pulmonary:Clear to auscultation, No wheezes, No crackles.  Neurologic:Awake, alert and oriented x 3, Normal age appropriate gait, No involuntary motions.  Extremities: No clubbing, cyanosis, or edema       ASSESSMENT AND PLAN   Raf Spear is a 67 y.o. male with hypertension, hyperlipidemia, CECILIA, GERD, and obstructive sleep apnea on CPAP.  Presents to Sleep Clinic to establish care for obstructive sleep apnea.    1.  Obstructive sleep apnea  Previous PSG split-night records are in media tab from December 27, 2020.  Compliance data reviewed showing 100% usage > 4hours in last 30  days. Average AHI 3.4 events/hour. 90-95% pressure 9.9 CWP. Pt continues to use and benefit from machine.   Current settings are 7-13 cmH2O    Office will work with him on setting up new DME in the area.  Given information regarding [employer] in [place].    PLAN:   -Order placed for mask and supplies   -Advised to reach out via MyChart with questions     Has been advised to continue the current  CPAP, clean equipment frequently, and get new mask and supplies as allowed by insurance and DME. Recommend an earlier appointment, if significant treatment barriers develop.    The risks of untreated sleep apnea were discussed with the patient at length. Patients with ANNE are at increased risk of cardiovascular disease including coronary artery disease, systemic arterial hypertension,  pulmonary arterial hypertension, cardiac arrythmias, and stroke. The patient was advised to avoid driving a motor vehicle when drowsy.    Positive airway pressure will favorably impact many of the adverse conditions and effects provoked by ANNE.    Have advised the patient to follow up with the appropriate healthcare practitioners for all other medical problems and issues.    Return in about 1 year (around 8/5/2023).    2.  Regarding treatment of other past medical problems and general health maintenance,  Pt is urged to follow up with PCP.      Please note portions of this record was created using voice recognition software. I have made every reasonable attempt to correct obvious errors, but I expect that there are errors of grammar and possibly content I did not discover before finalizing the note.

## 2022-08-17 ENCOUNTER — HOSPITAL ENCOUNTER (OUTPATIENT)
Facility: MEDICAL CENTER | Age: 67
End: 2022-08-17
Attending: DENTIST
Payer: MEDICARE

## 2022-08-17 PROCEDURE — 88305 TISSUE EXAM BY PATHOLOGIST: CPT

## 2022-08-18 LAB — PATHOLOGY CONSULT NOTE: NORMAL

## 2022-08-19 LAB — AMBIGUOUS DTTM AMBI4: NORMAL

## 2022-08-24 ENCOUNTER — HOSPITAL ENCOUNTER (OUTPATIENT)
Dept: RADIOLOGY | Facility: MEDICAL CENTER | Age: 67
End: 2022-08-24
Attending: FAMILY MEDICINE
Payer: MEDICARE

## 2022-08-24 DIAGNOSIS — R91.8 LUNG NODULES: ICD-10-CM

## 2022-08-24 DIAGNOSIS — Z13.6 SCREENING FOR AAA (ABDOMINAL AORTIC ANEURYSM): ICD-10-CM

## 2022-08-24 PROCEDURE — 71250 CT THORAX DX C-: CPT | Mod: MG

## 2022-08-24 PROCEDURE — 76775 US EXAM ABDO BACK WALL LIM: CPT

## 2022-09-01 ENCOUNTER — OFFICE VISIT (OUTPATIENT)
Dept: DERMATOLOGY | Facility: IMAGING CENTER | Age: 67
End: 2022-09-01
Payer: MEDICARE

## 2022-09-01 DIAGNOSIS — L72.0 EPIDERMAL CYST: ICD-10-CM

## 2022-09-01 DIAGNOSIS — L82.1 SK (SEBORRHEIC KERATOSIS): ICD-10-CM

## 2022-09-01 DIAGNOSIS — L73.8 SEBACEOUS HYPERPLASIA: ICD-10-CM

## 2022-09-01 DIAGNOSIS — D22.9 MULTIPLE NEVI: ICD-10-CM

## 2022-09-01 DIAGNOSIS — L57.0 ACTINIC KERATOSIS: ICD-10-CM

## 2022-09-01 DIAGNOSIS — Z12.83 SKIN CANCER SCREENING: ICD-10-CM

## 2022-09-01 DIAGNOSIS — L81.4 LENTIGINES: ICD-10-CM

## 2022-09-01 DIAGNOSIS — D18.01 CHERRY ANGIOMA: ICD-10-CM

## 2022-09-01 PROCEDURE — 17003 DESTRUCT PREMALG LES 2-14: CPT | Performed by: NURSE PRACTITIONER

## 2022-09-01 PROCEDURE — 17000 DESTRUCT PREMALG LESION: CPT | Performed by: NURSE PRACTITIONER

## 2022-09-01 PROCEDURE — 99213 OFFICE O/P EST LOW 20 MIN: CPT | Mod: 25 | Performed by: NURSE PRACTITIONER

## 2022-09-01 RX ORDER — VARENICLINE TARTRATE 0.5 MG/1
TABLET, FILM COATED ORAL
COMMUNITY
Start: 2022-08-29 | End: 2022-11-08

## 2022-09-01 NOTE — PROGRESS NOTES
DERMATOLOGY NOTE  NEW VISIT       Chief complaint: Establish Care (pauly)    HPI/location: back wife found it   Time present: unknown   Painful lesion: No  Itching lesion: No  Enlarging lesion: No  Anything make it better or worse?    History of skin cancer: Yes, Details: BCC back, chest  and BCC rt ear, 04/2021 MOHS    History of precancers/actinic keratoses: Yes, Details: face chest arms   History of biopsies:Yes, Details: teen  History of blistering/severe sunburns:Yes, Details: lots of sun exporsure   Family history of skin cancer:No  Family history of atypical moles:No    No Known Allergies     MEDICATIONS:  Medications relevant to specialty reviewed.     REVIEW OF SYSTEMS:   Positive for skin (see HPI)  Negative for fevers and chills       EXAM:  There were no vitals taken for this visit.  Constitutional: Well-developed, well-nourished, and in no distress.     A total body skin exam was performed excluding the genitals per patient preference and including the following areas: head (including face), neck, chest, abdomen, groin/buttocks, back, bilateral upper extremities, and bilateral lower extremities with the following pertinent findings listed below and/or in assessment/plan.       -sun exposed skin of trunk and b/l upper, lower extremities and face with scattered clinically benign light brown reticulated macules all of which were morphologically similar and none of which were suspicious for skin cancer today on exam  -Several scattered 1-3mm bright red macules and thin papules on the trunk and extremities  -Several medium and dark brown stuck-on waxy papules scattered on the trunk and extremities  -Multiple tan medium and dark brown skin-colored macules papules scattered over the trunk >> extremities-All with benign-appearing pigment network patterns on dermoscopy  -epidermal cyst Lt mid back   -AKs rt upper forehead  -Scattered SH on face     IMPRESSION / PLAN:    1. Actinic keratosis  CRYOTHERAPY:  Risks  (including, but not limited to: skin discoloration, redness, blister, blood blister, recurrence, need for further treatment, infection, scar) and benefits of cryotherapy discussed. Patient verbally agreed to proceed with treatment. 1 cryotherapy freeze thaw cycles of 10 seconds were applied to 2 lesions on areas as noted on exam with cryac. Patient tolerated procedure well. Aftercare instructions given--no specific care needed unless irritated during healing process, can apply Vaseline with small band-aid if needed.      2. Cherry angioma  - Benign-appearing nature of lesions discussed during exam.   - Advised to continue to monitor for any return to clinic for new or concerning changes.      3. Lentigines  - Benign-appearing nature of lesions discussed during exam.   - Advised to continue to monitor for any return to clinic for new or concerning changes.      4. SK (seborrheic keratosis)  - Benign-appearing nature of lesions discussed during exam.   - Advised to continue to monitor for any return to clinic for new or concerning changes.      5. Epidermal cyst  - Benign-appearing nature of lesions discussed during exam.   - Advised to continue to monitor for any return to clinic for new or concerning changes.      6. Sebaceous hyperplasia  - Benign-appearing nature of lesions discussed during exam.   - Advised to continue to monitor for any return to clinic for new or concerning changes.      7. Multiple nevi  - Benign-appearing nature of lesions discussed during exam.   - Advised to continue to monitor for any return to clinic for new or concerning changes.  - ABCDE's of melanoma discussed      8. Skin cancer screening  Skin cancer education  discussed importance of sun protective clothing, eyewear in addition to the use of broad spectrum sunscreen with SPF 30 or greater, as well as need for reapplication ~every 2 hours when exposed to UVR  discussed importance following up for any new or changing lesions as noted  in handout given, but every 12 months exams in clinic in the setting of dermatologic history  ABCDE's of melanoma discussed/handout given        Discussed risks, benefits, alternative treatments as well as common side effects associated with prescribed treatment, Patient verbalized understanding and agrees with plan regarding the above               Please note that this dictation was created using voice recognition software. I have made every reasonable attempt to correct obvious errors, but I expect that there are errors of grammar and possibly content that I did not discover before finalizing the note.      Return to clinic in: Return in about 6 months (around 3/1/2023) for PAYTON. and as needed for any new or changing skin lesions.

## 2022-09-04 DIAGNOSIS — E11.9 TYPE 2 DIABETES MELLITUS WITHOUT COMPLICATION, WITHOUT LONG-TERM CURRENT USE OF INSULIN (HCC): ICD-10-CM

## 2022-10-12 ENCOUNTER — HOSPITAL ENCOUNTER (OUTPATIENT)
Dept: LAB | Facility: MEDICAL CENTER | Age: 67
End: 2022-10-12
Attending: FAMILY MEDICINE
Payer: MEDICARE

## 2022-10-12 DIAGNOSIS — D50.9 MICROCYTIC ANEMIA: ICD-10-CM

## 2022-10-12 DIAGNOSIS — E11.9 TYPE 2 DIABETES MELLITUS WITHOUT COMPLICATION, WITHOUT LONG-TERM CURRENT USE OF INSULIN (HCC): ICD-10-CM

## 2022-10-12 LAB
ALBUMIN SERPL BCP-MCNC: 4.3 G/DL (ref 3.2–4.9)
ALBUMIN/GLOB SERPL: 1.7 G/DL
ALP SERPL-CCNC: 61 U/L (ref 30–99)
ALT SERPL-CCNC: 100 U/L (ref 2–50)
ANION GAP SERPL CALC-SCNC: 12 MMOL/L (ref 7–16)
AST SERPL-CCNC: 47 U/L (ref 12–45)
BILIRUB SERPL-MCNC: 0.4 MG/DL (ref 0.1–1.5)
BUN SERPL-MCNC: 21 MG/DL (ref 8–22)
CALCIUM SERPL-MCNC: 9.6 MG/DL (ref 8.5–10.5)
CHLORIDE SERPL-SCNC: 103 MMOL/L (ref 96–112)
CO2 SERPL-SCNC: 23 MMOL/L (ref 20–33)
CREAT SERPL-MCNC: 0.9 MG/DL (ref 0.5–1.4)
ERYTHROCYTE [DISTWIDTH] IN BLOOD BY AUTOMATED COUNT: 58.1 FL (ref 35.9–50)
EST. AVERAGE GLUCOSE BLD GHB EST-MCNC: 137 MG/DL
FERRITIN SERPL-MCNC: 58 NG/ML (ref 22–322)
GFR SERPLBLD CREATININE-BSD FMLA CKD-EPI: 92 ML/MIN/1.73 M 2
GLOBULIN SER CALC-MCNC: 2.5 G/DL (ref 1.9–3.5)
GLUCOSE SERPL-MCNC: 112 MG/DL (ref 65–99)
HBA1C MFR BLD: 6.4 % (ref 4–5.6)
HCT VFR BLD AUTO: 49.8 % (ref 42–52)
HGB BLD-MCNC: 16.5 G/DL (ref 14–18)
IRON SATN MFR SERPL: 61 % (ref 15–55)
IRON SERPL-MCNC: 213 UG/DL (ref 50–180)
MCH RBC QN AUTO: 27.3 PG (ref 27–33)
MCHC RBC AUTO-ENTMCNC: 33.1 G/DL (ref 33.7–35.3)
MCV RBC AUTO: 82.5 FL (ref 81.4–97.8)
PLATELET # BLD AUTO: 245 K/UL (ref 164–446)
PMV BLD AUTO: 9.7 FL (ref 9–12.9)
POTASSIUM SERPL-SCNC: 4.6 MMOL/L (ref 3.6–5.5)
PROT SERPL-MCNC: 6.8 G/DL (ref 6–8.2)
RBC # BLD AUTO: 6.04 M/UL (ref 4.7–6.1)
SODIUM SERPL-SCNC: 138 MMOL/L (ref 135–145)
TIBC SERPL-MCNC: 348 UG/DL (ref 250–450)
UIBC SERPL-MCNC: 135 UG/DL (ref 110–370)
WBC # BLD AUTO: 6.3 K/UL (ref 4.8–10.8)

## 2022-10-12 PROCEDURE — 85027 COMPLETE CBC AUTOMATED: CPT

## 2022-10-12 PROCEDURE — 83036 HEMOGLOBIN GLYCOSYLATED A1C: CPT

## 2022-10-12 PROCEDURE — 83540 ASSAY OF IRON: CPT

## 2022-10-12 PROCEDURE — 82728 ASSAY OF FERRITIN: CPT

## 2022-10-12 PROCEDURE — 80053 COMPREHEN METABOLIC PANEL: CPT

## 2022-10-12 PROCEDURE — 36415 COLL VENOUS BLD VENIPUNCTURE: CPT

## 2022-10-12 PROCEDURE — 83550 IRON BINDING TEST: CPT

## 2022-10-17 ENCOUNTER — OFFICE VISIT (OUTPATIENT)
Dept: MEDICAL GROUP | Facility: MEDICAL CENTER | Age: 67
End: 2022-10-17
Payer: MEDICARE

## 2022-10-17 VITALS
DIASTOLIC BLOOD PRESSURE: 70 MMHG | SYSTOLIC BLOOD PRESSURE: 130 MMHG | HEART RATE: 79 BPM | TEMPERATURE: 97.5 F | WEIGHT: 200.62 LBS | RESPIRATION RATE: 16 BRPM | BODY MASS INDEX: 28.72 KG/M2 | HEIGHT: 70 IN | OXYGEN SATURATION: 97 %

## 2022-10-17 DIAGNOSIS — R91.8 LUNG NODULES: ICD-10-CM

## 2022-10-17 DIAGNOSIS — R80.9 TYPE 2 DIABETES MELLITUS WITH MICROALBUMINURIA, WITHOUT LONG-TERM CURRENT USE OF INSULIN (HCC): ICD-10-CM

## 2022-10-17 DIAGNOSIS — E11.29 TYPE 2 DIABETES MELLITUS WITH MICROALBUMINURIA, WITHOUT LONG-TERM CURRENT USE OF INSULIN (HCC): ICD-10-CM

## 2022-10-17 DIAGNOSIS — G47.09 OTHER INSOMNIA: ICD-10-CM

## 2022-10-17 DIAGNOSIS — K76.0 HEPATIC STEATOSIS: ICD-10-CM

## 2022-10-17 DIAGNOSIS — D50.9 MICROCYTIC ANEMIA: ICD-10-CM

## 2022-10-17 PROCEDURE — 99214 OFFICE O/P EST MOD 30 MIN: CPT | Performed by: FAMILY MEDICINE

## 2022-10-17 RX ORDER — TRAZODONE HYDROCHLORIDE 50 MG/1
50 TABLET ORAL NIGHTLY
Qty: 30 TABLET | Refills: 3 | Status: SHIPPED | OUTPATIENT
Start: 2022-10-17 | End: 2022-11-11

## 2022-10-17 ASSESSMENT — ENCOUNTER SYMPTOMS
CHILLS: 0
PALPITATIONS: 0
FEVER: 0

## 2022-10-17 ASSESSMENT — PATIENT HEALTH QUESTIONNAIRE - PHQ9: CLINICAL INTERPRETATION OF PHQ2 SCORE: 0

## 2022-10-17 ASSESSMENT — FIBROSIS 4 INDEX: FIB4 SCORE: 1.32

## 2022-10-17 NOTE — ASSESSMENT & PLAN NOTE
Chronic problem, 9 mm nodule seen on recent imaging and repeat CT recommended in 3 months.  Ordered CT.  Referral to pulmonology.  Continue to refrain from smoking

## 2022-10-17 NOTE — ASSESSMENT & PLAN NOTE
New problem, recommended to stop Excedrin and Tylenol PM.  Start trazodone 50 mg at bedtime.  Stop Zoloft.

## 2022-10-17 NOTE — PROGRESS NOTES
FAMILY MEDICINE VISIT                                                               Chief complaint::Diagnoses of Microcytic anemia, Type 2 diabetes mellitus with microalbuminuria, without long-term current use of insulin (HCC), Hepatic steatosis, Other insomnia, and Lung nodules were pertinent to this visit.    History of present illness: Raf Spear is a 67 y.o. male who presented for lab follow-up.      Problem   Hepatic Steatosis    Recent CT showed hepatic steatosis and recent liver function test came back elevated.  His last lipid panel was normal.  He is currently not on any statins.     Other Insomnia    He reports that he takes Excedrin or Tylenol PM for sleep.  His recent liver function came back elevated.  He is on Zoloft for anxiety symptoms..  Reports that his anxiety is much better and would like to be off this medication.     Microcytic Anemia    Recent labs showed improvement in hemoglobin.  Iron levels came back elevated as he is taking iron supplementation which she stopped recently.  His last colonoscopy was in March 2022 he had 5 polyps and repeat recommended in 3 years.       Type 2 Diabetes Mellitus With Microalbuminuria, Without Long-Term Current Use of Insulin (Hcc)    Recently diagnosed with diabetes per patient.  A1c at 6.4.  On Metformin 1000 mg mg twice daily.  Up-to-date for foot exam and eye exam.    Lab Results   Component Value Date/Time    HBA1C 6.4 (H) 10/12/2022 09:41 AM         Lung Nodules    CT chest on 09/2021 showed Few groundglass opacities up to 10 mm better seen. Noncontrast chest CT in 6 months recommended. Solid pulmonary nodules of less than 6 mm not significantly changed as above. Few areas of mild mucus plugging    At last visit I prescribed him varnicline and he reports that he has been taking that medication and quit smoking.  CT chest 8/24/2022 showed No evidence of interstitial lung disease is identified.  2.  Ill-defined 9 mm opacity in the right middle lobe  may be infectious/inflammatory. Follow-up CT chest in in 3 months is recommended  3.  Tiny bilateral pulmonary nodules     Former Smoker    He smokes 4 to 5 cigarettes a day and has been smoking for 45 years.  He tried previously Wellbutrin.  He was prescribed Chantix, reports that has not taken that medication.  He reports that he read that Chantix is associated with pancreatic cancer.            Review of systems:     Review of Systems   Constitutional:  Negative for chills, fever and malaise/fatigue.   Cardiovascular:  Negative for chest pain, palpitations and leg swelling.      Medications and Allergies:     Current Outpatient Medications   Medication Sig Dispense Refill    traZODone (DESYREL) 50 MG Tab Take 1 Tablet by mouth every evening. 30 Tablet 3    metFORMIN (GLUCOPHAGE) 500 MG Tab Take 2 Tablets by mouth 2 times a day with meals. 360 Tablet 3    varenicline (CHANTIX) 0.5 MG tablet       Omega-3 Fatty Acids (FISH OIL) 1000 MG Cap capsule Take 1,000 mg by mouth 3 times a day with meals.      Zinc Sulfate (ZINC-220 PO) Take  by mouth.      Ascorbic Acid (VITAMIN C CR) 1000 MG Tab CR Take  by mouth.      vitamin D3 (CHOLECALCIFEROL) 1000 Unit (25 mcg) Tab Take 1,000 Units by mouth every day.      glucosamine Sulfate 500 MG Cap Take 500 mg by mouth 3 times a day with meals.      amLODIPine (NORVASC) 5 MG Tab Take 1 Tablet by mouth every day. For high blood pressure 90 Tablet 3    lisinopril (PRINIVIL) 40 MG tablet TAKE 1 TABLET BY MOUTH DAILY FOR HIGH BLOOD PRESSURE DISORDER 90 Tablet 3    metoprolol SR (TOPROL XL) 50 MG TABLET SR 24 HR TAKE 1 TABLET BY MOUTH DAILY FOR HIGH BLOOD PRESSURE DISORDER 90 Tablet 3    omeprazole (PRILOSEC) 20 MG delayed-release capsule TAKE 1 CAPSULE BY MOUTH DAILY FOR GASTROESOPHAGEAL REFLUX DISEASE 90 Capsule 3    ASPIRIN LOW DOSE PO Take 5 mg by mouth.      Methylsulfonylmethane (MSM) 1000 MG Cap Take 1,000 mg by mouth.      Multiple Vitamin (MULTI-VITAMIN DAILY PO) Take  by  "mouth.       No current facility-administered medications for this visit.          Vitals:    /70 (BP Location: Left arm, Patient Position: Sitting, BP Cuff Size: Adult)   Pulse 79   Temp 36.4 °C (97.5 °F)   Resp 16   Ht 1.778 m (5' 10\")   Wt 91 kg (200 lb 9.9 oz)   SpO2 97%  Body mass index is 28.79 kg/m².    Physical Exam:     Physical Exam  Constitutional:       General: He is not in acute distress.     Appearance: Normal appearance.   HENT:      Head: Normocephalic and atraumatic.      Right Ear: Tympanic membrane, ear canal and external ear normal.      Left Ear: Tympanic membrane, ear canal and external ear normal.   Eyes:      Conjunctiva/sclera: Conjunctivae normal.   Cardiovascular:      Rate and Rhythm: Normal rate.   Pulmonary:      Effort: Pulmonary effort is normal. No respiratory distress.   Musculoskeletal:         General: No deformity.      Cervical back: Neck supple.   Skin:     Findings: No rash.   Neurological:      Mental Status: He is alert.      Gait: Gait is intact.   Psychiatric:         Mood and Affect: Mood and affect normal.         Judgment: Judgment normal.        Labs:  I reviewed with patient recent labs resulted on 10/12/2022    Assessment/Plan:         Problem List Items Addressed This Visit       Hepatic steatosis     New problem, recommended to eat healthy diet and exercise.  Recheck labs in 3 months.         Relevant Orders    Comp Metabolic Panel    Lung nodules     Chronic problem, 9 mm nodule seen on recent imaging and repeat CT recommended in 3 months.  Ordered CT.  Referral to pulmonology.  Continue to refrain from smoking         Relevant Orders    CT-CHEST (THORAX) W/O    Referral to Pulmonary and Sleep Medicine    Microcytic anemia     Chronic problem, stable, continue multivitamins.  Recheck labs in 3 months.         Relevant Orders    CBC WITH DIFFERENTIAL    Other insomnia     New problem, recommended to stop Excedrin and Tylenol PM.  Start trazodone 50 mg " at bedtime.  Stop Zoloft.         Relevant Medications    traZODone (DESYREL) 50 MG Tab    Type 2 diabetes mellitus with microalbuminuria, without long-term current use of insulin (HCC)     Chronic problem, stable, continue same medication regimen.  Recheck labs in 3 months.         Relevant Orders    Comp Metabolic Panel    HEMOGLOBIN A1C        Please note that this dictation was created using voice recognition software. I have made every reasonable attempt to correct obvious errors, but I expect that there are errors of grammar and possibly content that I did not discover before finalizing the note.    Follow up in 4 months for lab follow-up and medication follow-up

## 2022-10-27 ENCOUNTER — HOSPITAL ENCOUNTER (OUTPATIENT)
Dept: RADIOLOGY | Facility: MEDICAL CENTER | Age: 67
End: 2022-10-27
Attending: FAMILY MEDICINE
Payer: MEDICARE

## 2022-10-27 DIAGNOSIS — R91.8 LUNG NODULES: ICD-10-CM

## 2022-10-27 PROCEDURE — 71250 CT THORAX DX C-: CPT

## 2022-10-31 ASSESSMENT — ENCOUNTER SYMPTOMS
WHEEZING: 0
DYSPNEA AT REST: 1
CHEST TIGHTNESS: 0
HEMOPTYSIS: 0
RESPIRATORY SYMPTOMS COMMENTS: NO
SHORTNESS OF BREATH: 1

## 2022-11-04 ENCOUNTER — PATIENT MESSAGE (OUTPATIENT)
Dept: HEALTH INFORMATION MANAGEMENT | Facility: OTHER | Age: 67
End: 2022-11-04

## 2022-11-04 ENCOUNTER — DOCUMENTATION (OUTPATIENT)
Dept: HEALTH INFORMATION MANAGEMENT | Facility: OTHER | Age: 67
End: 2022-11-04
Payer: MEDICARE

## 2022-11-08 ENCOUNTER — OFFICE VISIT (OUTPATIENT)
Dept: SLEEP MEDICINE | Facility: MEDICAL CENTER | Age: 67
End: 2022-11-08
Payer: MEDICARE

## 2022-11-08 VITALS
HEIGHT: 70 IN | SYSTOLIC BLOOD PRESSURE: 146 MMHG | BODY MASS INDEX: 28.92 KG/M2 | OXYGEN SATURATION: 95 % | DIASTOLIC BLOOD PRESSURE: 84 MMHG | WEIGHT: 202 LBS | HEART RATE: 86 BPM

## 2022-11-08 DIAGNOSIS — G47.33 OBSTRUCTIVE SLEEP APNEA SYNDROME: ICD-10-CM

## 2022-11-08 DIAGNOSIS — R05.3 CHRONIC COUGH: ICD-10-CM

## 2022-11-08 DIAGNOSIS — Z87.891 FORMER SMOKER: ICD-10-CM

## 2022-11-08 DIAGNOSIS — R91.8 LUNG NODULES: ICD-10-CM

## 2022-11-08 PROCEDURE — 99203 OFFICE O/P NEW LOW 30 MIN: CPT | Performed by: INTERNAL MEDICINE

## 2022-11-08 RX ORDER — TIOTROPIUM BROMIDE INHALATION SPRAY 3.12 UG/1
5 SPRAY, METERED RESPIRATORY (INHALATION) DAILY
Qty: 1 EACH | Refills: 6 | Status: SHIPPED | OUTPATIENT
Start: 2022-11-08 | End: 2023-02-17

## 2022-11-08 ASSESSMENT — FIBROSIS 4 INDEX: FIB4 SCORE: 1.32

## 2022-11-08 NOTE — PATIENT INSTRUCTIONS
Thanks for coming to the office today.  Please bring all of your medication bottles to the next office visit, specially inhalers.  If requested, please obtain the prior records from your lung doctor.  If you have been prescribed inhalers, please use them as indicated and please rinse your mouth after using them each time.  Call if any questions!                    Return To Clinic:  3 months. Please do not forget to come at least 20 minutes prior to your appointment.  It has been a pleasure seeing you today.    Adi Tejeda MD  Pulmonary/Critical Care

## 2022-11-08 NOTE — PROGRESS NOTES
"Pulmonary Clinic Office Note    Reason for visit: \"new patient\"    Chart reviewed prior to patient interview.    Raf Spear is a 67 y.o. year old male, with a PMHx of pulmonary nodules  who presented to the Pulmonary Clinic for a new referral.      Background and today:  Patient reports that has a hx of pulmonary nodules  He was move to [place] about 1 year ago,  moved from [place]  He reports a hx of chronic cough for many years and on lisinopril also at times with clear sputum.  No GERD symptoms but takes prilosec  Reports hx of  \"sinus headaches\" at night  No history of respiratory failure requiring mechanical ventilation  No history of hemoptysis.  No Personal history of non-resolving or recurrent pneumonia  No Personal history of DVT or pulmonary embolism  No reported NSAID precipitated cough, wheeze or sinus congestion    No cold air intolerance  No exercise induced cough wheeze  family hx of atopy and or asthma: sister with asthma.  No history of nasal polyps  hx of recent COVID-19 infection on july 2021    Pulmonary History:  Inhaler Regimen before this clinic visit: none  Tobacco use:  quit last month, started smoking at age 16 yo. Most smoked in a single day was 1/2 ppd. No other inhalants.  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   1  I get short of breath when hurrying on level ground or walking up a slight hill.   2  On level ground, I walk slower than people of the same age because of breathlessness, or have to stop for breath when walking at my own pace.   3  I stop for breath after walking about 100 yards or after a few minutes on level ground.   4  I am too breathless to leave the house or I am breathless when dressing.     Influenza Vaccine:  yes  COVID vaccine: none  Pneumovax:  yes, last one last year      Sleep History: he was tested for ANNE " dec 2020, diagnosed with ANNE and on CPAP.    ----------------------------------------------------------------------------------------------------------------------------------------------------------------------------------------------------------------------------------------------------------------  Past Medical History:   Diagnosis Date    Cough     Daytime sleepiness     Diabetes (HCC)     Romanian measles     Hearing difficulty     Hyperlipidemia     Hypertension     Influenza     Morning headache     Mumps     Snoring     Wears glasses      No Known Allergies  Family History   Problem Relation Age of Onset    Cancer Mother         Lymphoma Leukemia    Hypertension Father     Cancer Father         Colon cancer     Colon Cancer Father     Cancer Sister         Diabetic, COPD     Past Surgical History:   Procedure Laterality Date    LAMINOTOMY      LUMBAR DECOMPRESSION      TONSILLECTOMY       Social History     Tobacco Use    Smoking status: Former     Packs/day: 0.30     Years: 50.00     Pack years: 15.00     Types: Cigarettes     Start date: 1976     Quit date: 10/1/2022     Years since quittin.1    Smokeless tobacco: Never    Tobacco comments:     Have quit and restarted several times.  Most recently about a month ago   Vaping Use    Vaping Use: Never used   Substance Use Topics    Alcohol use: Yes     Alcohol/week: 1.2 oz     Types: 2 Cans of beer per week     Comment: Social    Drug use: Never         Review of Systems (Positive in Bold, otherwise negative)    Constitutional: fever, chills, night sweats, weightloss  HEENT: headaches, migraines, vision changes, blurred vision, dry eyes,  changes in hearing, rhinorrhea, sinus congestion, dysphagia  Hoarseness of voice, choking  CV: chest pain, PAGE, orthopnea, edema, palpitations  Resp: SOB, cough, hemoptysis, asthma, repeated infections, sputum production, wheezing  GI: changes in appetite, nausea, vomiting, diarrhea, constipation, pain, GERD  :  "Dysuria, hematuria, nocturia  Lymph: swollen glands  MSK: Muscle weakness, wasting, arthralgia, myalgia  Neuro/Psych: Sensory disturbances, seizures, syncope, anxiety, depression    Objective:  Vitals: BP (!) 146/84 (BP Location: Left arm, Patient Position: Sitting, BP Cuff Size: Adult)   Pulse 86   Ht 1.778 m (5' 10\")   Wt 91.6 kg (202 lb)   SpO2 95%   BMI 28.98 kg/m²     Wt Readings from Last 3 Encounters:   11/08/22 91.6 kg (202 lb)   10/17/22 91 kg (200 lb 9.9 oz)   08/05/22 91.6 kg (202 lb)     Gen: AAO x 3, appears of stated age, well-nourished and in NAD  Eyes: sclerae anicteric, conjunctivae appear normal, PEERLA, EOM in tact  ENT: EAC appears normal w/o erythema/exudate.  Neck: Supple w/o evidence of LAD, thyroid normal and size and w/o nodularity  CV: RRR w/o murmurs, rubs, gallops. Normal S1/S2. No peripheral edema orJVD.  Lungs: CTAB w/o wheezing, rales, rhonchi. Good air entry, normal chest excursion.  GI: Soft and non-tender, + BS x 4. No rebound or guarding.  Extremities: +2/4 bilateral pedal pulses, cool and dry, no edema.  MS: +5/5 bilateral UE/LE muscle strength testing, no obvious joint deformities, swelling noted, good overall muscle tone  Neuro: No focal neurological deficits    ----------------------------------------------------------------------------------------------------------------------------------------------------------------------------------------------------------------------------------------------------------------  Labs, Imaging & Scoring Systems:    Lab results since patient's previous encounter were reviewed with the patient.  Pertinent results discussed below or included within the note.    PFTs (Date: none to date)-      CT Chest: (Date: 10/27/22)-  Lungs: There are again seen tiny subsolid urinary nodules within the right lung apex on image 22 measuring 3 mm or less in size, unchanged.  There is a stable subsolid pulmonary nodule measuring 4 mm in size within the right " "upper lobe on image 27.  There is a new subsolid nodular density within the right middle lobe adjacent to the major and minor fissures measuring 4 mm in size on image 78.  Ill-defined subsolid nodule within the right middle lobe peripherally measures 6 mm in size and is smaller than prior and is located on image 85.  There is a tiny 3 mm nodule within the left lower lobe on image 98, unchanged. There is a stable 3 mm nodule within the left lower lobe on image 105.  IMPRESSION:   1.  Again seen small bilateral pulmonary nodules. Most of these are stable with the largest nodule within the right middle lobe decreased in size from 9 mm to 6 mm. There is a new subsolid nodular density within the right middle lobe measuring 4 mm in   size.   2.  No evidence of adenopathy.   3.  Gallstones.   4.  Fatty liver.   5.  Enhancing small renal stones.   6.  Stable ascending aortic ectasia measuring 3.9 cm.     PET scan 1/3/19  1.  Apparent \"waxing and waning\" pattern of multiple   subcentimeter pulmonary nodular densities.  Relative to the   patient's recent chest CT (performed approximately 4 weeks ago),   a prior 9 mm nodular density within the right upper pulmonary   lobe has resolved, while other pulmonary nodules are new   (including a 7 mm nodule within the right lower lobe).  This   pattern favors a benign infectious/inflammatory process, though   continued imaging surveillance of the lungs may be warranted.     2.  Nonspecific small (1 cm), mildly hypermetabolic hyperdensity   within the right parotid gland.  This most likely reflects a   benign salivary gland neoplasm (e.g. a Warthin's tumor).     3.  Other likely-benign findings (as detailed above), including   hepatic steatosis, cholelithiasis, and nephrolithiasis.      ECHO, (date: none to " date)    ----------------------------------------------------------------------------------------------------------------------------------------------------------------------------------------------------------------------------------------------------------------      Impression:    Pulmonary nodules  Chronic cough  Shortness of breath      Discussion:    Raf Spear is a 67 y.o. with a history of pulmonary nodules that have been followed for several years, patient has about 10 CT scans done in the last 2 and half years, including 1 PET scan.  These are pulmonary nodules that are less then 5 mm and most of them have proven stability throughout the years.  He does have the risk factor of former smoker and skin cancers.  We will consider 1 year CT follow-up but I have recommended against any other for now to prevent more exposure to radiation.  Recommend to continue with PPI for GERD  Patient has a history of chronic cough and is currently taking lisinopril, I would recommend to stop this medication (can change to an ARB) given that it is well-known that lisinopril can cause chronic cough even years after starting the medication.  We will empirically treat with Spiriva    Plan:    Recommend discussion with primary care physician regarding stopping lisinopril given chronic cough.  We will obtain pulmonary function test given his history of chronic cough and shortness of breath, exposure to tobacco products.  There is no need to obtain any more CTs at the time being, I recommend CT follow-up in October 2023 to prove a final stability from the new pulmonary small nodules.    * Patient Education                         - Educated the patient on his/her disease processes                         - Answered all questions to the patients satisfaction.                         - Reminded the patient to bring all of his/her medication bottles to the next office visit.                           * Return To Clinic:  3  "months or PRN      The patient voiced understanding of the above treatment approach and agreed with the direction of care. The patient was educated about their conditions and all questions were addressed during this encounter. I have also reminded the patient to bring all of their medications to the next office visit.  Raf Devon Rainer was instructed to call the office if any questions or concerns arise prior to their next appointment.  This note reflects my clinical thought processes and is intended primarily for the exchange of information between healthcare providers.  It is not written primarily to communicate with the patient or family directly, which takes place in-person in clinic, by phone/virtual visits or the bedside.  This note might contain sensitive information, including substance use, mental health and consideration of sensitive, serious or other \"do-not-miss\" diagnoses, which is further discussed at the bedside.    Adi Tejeda MD FACP  Pulmonary/Critical Care      "

## 2022-11-09 ENCOUNTER — TELEPHONE (OUTPATIENT)
Dept: HEALTH INFORMATION MANAGEMENT | Facility: OTHER | Age: 67
End: 2022-11-09
Payer: MEDICARE

## 2022-11-11 DIAGNOSIS — G47.09 OTHER INSOMNIA: ICD-10-CM

## 2022-11-11 RX ORDER — TRAZODONE HYDROCHLORIDE 50 MG/1
TABLET ORAL
Qty: 30 TABLET | Refills: 3 | Status: SHIPPED | OUTPATIENT
Start: 2022-11-11 | End: 2023-01-16

## 2022-11-11 NOTE — TELEPHONE ENCOUNTER
Received request via: Pharmacy    Was the patient seen in the last year in this department? Yes    Does the patient have an active prescription (recently filled or refills available) for medication(s) requested? No    Does the patient have [facility] and need 100 day supply (blood pressure, diabetes and cholesterol meds only)? Patient does not have S[facility]

## 2022-12-13 ENCOUNTER — OFFICE VISIT (OUTPATIENT)
Dept: MEDICAL GROUP | Facility: MEDICAL CENTER | Age: 67
End: 2022-12-13
Payer: MEDICARE

## 2022-12-13 VITALS
OXYGEN SATURATION: 95 % | RESPIRATION RATE: 16 BRPM | HEART RATE: 83 BPM | SYSTOLIC BLOOD PRESSURE: 132 MMHG | HEIGHT: 70 IN | WEIGHT: 202.82 LBS | DIASTOLIC BLOOD PRESSURE: 66 MMHG | TEMPERATURE: 98.2 F | BODY MASS INDEX: 29.04 KG/M2

## 2022-12-13 DIAGNOSIS — H81.13 BENIGN PAROXYSMAL POSITIONAL VERTIGO DUE TO BILATERAL VESTIBULAR DISORDER: ICD-10-CM

## 2022-12-13 DIAGNOSIS — R80.9 TYPE 2 DIABETES MELLITUS WITH MICROALBUMINURIA, WITHOUT LONG-TERM CURRENT USE OF INSULIN (HCC): ICD-10-CM

## 2022-12-13 DIAGNOSIS — E11.29 TYPE 2 DIABETES MELLITUS WITH MICROALBUMINURIA, WITHOUT LONG-TERM CURRENT USE OF INSULIN (HCC): ICD-10-CM

## 2022-12-13 DIAGNOSIS — I10 ESSENTIAL HYPERTENSION: ICD-10-CM

## 2022-12-13 PROCEDURE — 99214 OFFICE O/P EST MOD 30 MIN: CPT | Performed by: FAMILY MEDICINE

## 2022-12-13 RX ORDER — VALSARTAN 160 MG/1
160 TABLET ORAL DAILY
Qty: 90 TABLET | Refills: 3 | Status: SHIPPED | OUTPATIENT
Start: 2022-12-13 | End: 2023-02-17

## 2022-12-13 ASSESSMENT — ENCOUNTER SYMPTOMS
DIZZINESS: 1
FEVER: 0
CHILLS: 0
PALPITATIONS: 0

## 2022-12-13 ASSESSMENT — FIBROSIS 4 INDEX: FIB4 SCORE: 1.32

## 2022-12-14 NOTE — PROGRESS NOTES
FAMILY MEDICINE VISIT                                                               Chief complaint::Diagnoses of Type 2 diabetes mellitus with microalbuminuria, without long-term current use of insulin ([facility]), Essential hypertension, and Benign paroxysmal positional vertigo due to bilateral vestibular disorder were pertinent to this visit.    History of present illness: Raf Spear is a 67 y.o. male who presented for blood pressure follow-up, dizziness symptoms.    He was having cough symptoms and his medication was switched to losartan from lisinopril.  He has been taking losartan 100 mg.  Reports that he initially experienced dizziness symptoms with this medication and had a fall.  He is now experiencing vertigo which is similar to benign positional vertigo.  Whenever he changes his head position, he gets vertigo symptoms.  He has still side effects with losartan 100 mg.  He is on amlodipine 5 mg.  His blood pressure today came back at 132/66, home blood pressure readings are in 130s to 150s/70-90s range.  No chest pain, palpitations, shortness of breath, lower leg swelling.      Review of systems:     Review of Systems   Constitutional:  Negative for chills, fever and malaise/fatigue.   Cardiovascular:  Negative for chest pain, palpitations and leg swelling.   Neurological:  Positive for dizziness.      Medications and Allergies:     Current Outpatient Medications   Medication Sig Dispense Refill    valsartan (DIOVAN) 160 MG Tab Take 1 Tablet by mouth every day. 90 Tablet 3    traZODone (DESYREL) 50 MG Tab TAKE 1 TABLET BY MOUTH EVERY DAY IN THE EVENING 30 Tablet 3    tiotropium (SPIRIVA RESPIMAT) 2.5 mcg/Act Aero Soln Inhale 2 Inhalation every day. Assemble and prime. 1 Each 6    metFORMIN (GLUCOPHAGE) 500 MG Tab Take 2 Tablets by mouth 2 times a day with meals. 360 Tablet 3    Omega-3 Fatty Acids (FISH OIL) 1000 MG Cap capsule Take 1 Capsule by mouth 3 times a day with meals.      Zinc Sulfate (ZINC-220  "PO) Take  by mouth.      Ascorbic Acid (VITAMIN C CR) 1000 MG Tab CR Take  by mouth.      vitamin D3 (CHOLECALCIFEROL) 1000 Unit (25 mcg) Tab Take 1 Tablet by mouth every day.      glucosamine Sulfate 500 MG Cap Take 1 Capsule by mouth 3 times a day with meals.      amLODIPine (NORVASC) 5 MG Tab Take 1 Tablet by mouth every day. For high blood pressure 90 Tablet 3    metoprolol SR (TOPROL XL) 50 MG TABLET SR 24 HR TAKE 1 TABLET BY MOUTH DAILY FOR HIGH BLOOD PRESSURE DISORDER 90 Tablet 3    omeprazole (PRILOSEC) 20 MG delayed-release capsule TAKE 1 CAPSULE BY MOUTH DAILY FOR GASTROESOPHAGEAL REFLUX DISEASE 90 Capsule 3    ASPIRIN LOW DOSE PO Take 5 mg by mouth.      Methylsulfonylmethane (MSM) 1000 MG Cap Take 1 Capsule by mouth.      Multiple Vitamin (MULTI-VITAMIN DAILY PO) Take  by mouth.       No current facility-administered medications for this visit.          Vitals:    /66 (BP Location: Left arm, Patient Position: Sitting, BP Cuff Size: Adult)   Pulse 83   Temp 36.8 °C (98.2 °F)   Resp 16   Ht 1.778 m (5' 10\")   Wt 92 kg (202 lb 13.2 oz)   SpO2 95%  Body mass index is 29.1 kg/m².    Physical Exam:     Physical Exam  Constitutional:       General: He is not in acute distress.  HENT:      Head: Normocephalic and atraumatic.      Right Ear: Tympanic membrane, ear canal and external ear normal. There is no impacted cerumen.      Left Ear: Tympanic membrane, ear canal and external ear normal. There is no impacted cerumen.   Eyes:      Conjunctiva/sclera: Conjunctivae normal.   Cardiovascular:      Rate and Rhythm: Normal rate.   Pulmonary:      Effort: Pulmonary effort is normal. No respiratory distress.   Musculoskeletal:         General: No deformity.      Cervical back: Neck supple.   Skin:     Findings: No rash.   Neurological:      General: No focal deficit present.      Mental Status: He is alert. Mental status is at baseline.      Cranial Nerves: No cranial nerve deficit.      Gait: Gait is " intact.   Psychiatric:         Mood and Affect: Mood and affect normal.         Judgment: Judgment normal.        Assessment/Plan:     1. Type 2 diabetes mellitus with microalbuminuria, without long-term current use of insulin (HCC)  Chronic problem, stable, has microalbuminuria, switch to valsartan as he is having side effects with losartan  - valsartan (DIOVAN) 160 MG Tab; Take 1 Tablet by mouth every day.  Dispense: 90 Tablet; Refill: 3    2. Essential hypertension  Chronic problem, unstable blood pressure readings at home, having side effects with losartan.  Switch to valsartan 160 mg daily.  Monitor blood pressure at home.  Discussed that can take 1-1/2 tablet or 2 tablets of this medication if blood pressure is above 140/90.  Bring blood pressure machine at next visit.    - valsartan (DIOVAN) 160 MG Tab; Take 1 Tablet by mouth every day.  Dispense: 90 Tablet; Refill: 3    3. Benign paroxysmal positional vertigo due to bilateral vestibular disorder  New problem, unstable, symptoms are consistent with benign positional vertigo, recommended to do Epley's maneuver at home which he is familiar with.    Please note that this dictation was created using voice recognition software. I have made every reasonable attempt to correct obvious errors, but I expect that there are errors of grammar and possibly content that I did not discover before finalizing the note.    Follow up in 02/17/2022 for medication follow up.

## 2023-01-09 ENCOUNTER — NON-PROVIDER VISIT (OUTPATIENT)
Dept: SLEEP MEDICINE | Facility: MEDICAL CENTER | Age: 68
End: 2023-01-09
Attending: INTERNAL MEDICINE
Payer: MEDICARE

## 2023-01-09 VITALS — WEIGHT: 202 LBS | HEIGHT: 70 IN | BODY MASS INDEX: 28.92 KG/M2

## 2023-01-09 DIAGNOSIS — G47.33 OBSTRUCTIVE SLEEP APNEA SYNDROME: ICD-10-CM

## 2023-01-09 DIAGNOSIS — R91.8 LUNG NODULES: ICD-10-CM

## 2023-01-09 DIAGNOSIS — Z87.891 FORMER SMOKER: ICD-10-CM

## 2023-01-09 DIAGNOSIS — R05.3 CHRONIC COUGH: ICD-10-CM

## 2023-01-09 PROCEDURE — 94729 DIFFUSING CAPACITY: CPT | Performed by: INTERNAL MEDICINE

## 2023-01-09 PROCEDURE — 94060 EVALUATION OF WHEEZING: CPT | Performed by: INTERNAL MEDICINE

## 2023-01-09 PROCEDURE — 94726 PLETHYSMOGRAPHY LUNG VOLUMES: CPT | Performed by: INTERNAL MEDICINE

## 2023-01-09 ASSESSMENT — PULMONARY FUNCTION TESTS
FEV1_LLN: 2.72
FEV1/FVC: 86.77
FEV1: 3.16
FEV1/FVC_PERCENT_PREDICTED: 114
FEV1_PERCENT_CHANGE: 3
FEV1_PERCENT_CHANGE: 0
FEV1/FVC_PERCENT_CHANGE: 2
FEV1/FVC: 84
FVC: 3.74
FEV1/FVC_PERCENT_PREDICTED: 111
FEV1/FVC_PREDICTED: 76
FEV1/FVC_PERCENT_LLN: 64
FEV1_PERCENT_PREDICTED: 97
FEV1/FVC_PERCENT_PREDICTED: 114
FEV1/FVC_PERCENT_PREDICTED: 76
FEV1: 3.28
FVC_PERCENT_PREDICTED: 87
FEV1/FVC: 87
FVC: 3.78
FVC_PERCENT_PREDICTED: 88
FVC_LLN: 3.58
FEV1/FVC: 85
FVC_PREDICTED: 4.29
FEV1/FVC_PERCENT_PREDICTED: 111
FEV1_PERCENT_PREDICTED: 100
FEV1_PREDICTED: 3.25

## 2023-01-09 ASSESSMENT — FIBROSIS 4 INDEX: FIB4 SCORE: 1.32

## 2023-01-09 NOTE — PROCEDURES
Technician: JOJO Chung    Technician Comment:  Good patient effort & cooperation.  The results of this test meet the ATS/ERS standards for acceptability & reproducibility.  Test was performed on the InCast Body Plethysmograph-Elite DX system.  Predicted values were GLI-2012 for spirometry, GLI-2017 for DLCO, ITS for Lung Volumes.  The DLCO was uncorrected for Hgb.  A bronchodilator of Ventolin HFA -2puffs via spacer administered.  DLCO performed during dilation period.    Interpretation:   Baseline spirometry shows normal airflows.  No significant bronchodilator response.  Lung volumes are within normal limits.  Diffusion capacity is elevated at 136% predicted.  Normal pulmonary function testing with normal flow volume loop.

## 2023-01-14 DIAGNOSIS — G47.09 OTHER INSOMNIA: ICD-10-CM

## 2023-01-16 RX ORDER — TRAZODONE HYDROCHLORIDE 50 MG/1
TABLET ORAL
Qty: 90 TABLET | Refills: 3 | Status: SHIPPED | OUTPATIENT
Start: 2023-01-16 | End: 2023-09-07

## 2023-02-03 RX ORDER — METOPROLOL SUCCINATE 50 MG/1
TABLET, EXTENDED RELEASE ORAL
Qty: 90 TABLET | Refills: 3 | Status: SHIPPED | OUTPATIENT
Start: 2023-02-03 | End: 2023-10-23 | Stop reason: SDUPTHER

## 2023-02-03 RX ORDER — OMEPRAZOLE 20 MG/1
CAPSULE, DELAYED RELEASE ORAL
Qty: 90 CAPSULE | Refills: 3 | Status: SHIPPED | OUTPATIENT
Start: 2023-02-03 | End: 2023-10-23 | Stop reason: SDUPTHER

## 2023-02-03 RX ORDER — AMLODIPINE BESYLATE 5 MG/1
5 TABLET ORAL DAILY
Qty: 90 TABLET | Refills: 3 | Status: SHIPPED | OUTPATIENT
Start: 2023-02-03 | End: 2023-10-23 | Stop reason: SDUPTHER

## 2023-02-04 ENCOUNTER — HOSPITAL ENCOUNTER (OUTPATIENT)
Dept: LAB | Facility: MEDICAL CENTER | Age: 68
End: 2023-02-04
Attending: FAMILY MEDICINE
Payer: MEDICARE

## 2023-02-04 DIAGNOSIS — R80.9 TYPE 2 DIABETES MELLITUS WITH MICROALBUMINURIA, WITHOUT LONG-TERM CURRENT USE OF INSULIN (HCC): ICD-10-CM

## 2023-02-04 DIAGNOSIS — D50.9 MICROCYTIC ANEMIA: ICD-10-CM

## 2023-02-04 DIAGNOSIS — E11.29 TYPE 2 DIABETES MELLITUS WITH MICROALBUMINURIA, WITHOUT LONG-TERM CURRENT USE OF INSULIN (HCC): ICD-10-CM

## 2023-02-04 DIAGNOSIS — K76.0 HEPATIC STEATOSIS: ICD-10-CM

## 2023-02-04 LAB
ALBUMIN SERPL BCP-MCNC: 4.5 G/DL (ref 3.2–4.9)
ALBUMIN/GLOB SERPL: 1.5 G/DL
ALP SERPL-CCNC: 57 U/L (ref 30–99)
ALT SERPL-CCNC: 102 U/L (ref 2–50)
ANION GAP SERPL CALC-SCNC: 11 MMOL/L (ref 7–16)
AST SERPL-CCNC: 51 U/L (ref 12–45)
BASOPHILS # BLD AUTO: 0.9 % (ref 0–1.8)
BASOPHILS # BLD: 0.06 K/UL (ref 0–0.12)
BILIRUB SERPL-MCNC: 0.4 MG/DL (ref 0.1–1.5)
BUN SERPL-MCNC: 20 MG/DL (ref 8–22)
CALCIUM ALBUM COR SERPL-MCNC: 9.5 MG/DL (ref 8.5–10.5)
CALCIUM SERPL-MCNC: 9.9 MG/DL (ref 8.5–10.5)
CHLORIDE SERPL-SCNC: 105 MMOL/L (ref 96–112)
CO2 SERPL-SCNC: 27 MMOL/L (ref 20–33)
CREAT SERPL-MCNC: 0.97 MG/DL (ref 0.5–1.4)
EOSINOPHIL # BLD AUTO: 0.18 K/UL (ref 0–0.51)
EOSINOPHIL NFR BLD: 2.6 % (ref 0–6.9)
ERYTHROCYTE [DISTWIDTH] IN BLOOD BY AUTOMATED COUNT: 44.4 FL (ref 35.9–50)
EST. AVERAGE GLUCOSE BLD GHB EST-MCNC: 140 MG/DL
FASTING STATUS PATIENT QL REPORTED: NORMAL
GFR SERPLBLD CREATININE-BSD FMLA CKD-EPI: 84 ML/MIN/1.73 M 2
GLOBULIN SER CALC-MCNC: 3 G/DL (ref 1.9–3.5)
GLUCOSE SERPL-MCNC: 125 MG/DL (ref 65–99)
HBA1C MFR BLD: 6.5 % (ref 4–5.6)
HCT VFR BLD AUTO: 51.5 % (ref 42–52)
HGB BLD-MCNC: 17 G/DL (ref 14–18)
IMM GRANULOCYTES # BLD AUTO: 0.03 K/UL (ref 0–0.11)
IMM GRANULOCYTES NFR BLD AUTO: 0.4 % (ref 0–0.9)
LYMPHOCYTES # BLD AUTO: 1.6 K/UL (ref 1–4.8)
LYMPHOCYTES NFR BLD: 23 % (ref 22–41)
MCH RBC QN AUTO: 28.9 PG (ref 27–33)
MCHC RBC AUTO-ENTMCNC: 33 G/DL (ref 33.7–35.3)
MCV RBC AUTO: 87.4 FL (ref 81.4–97.8)
MONOCYTES # BLD AUTO: 0.75 K/UL (ref 0–0.85)
MONOCYTES NFR BLD AUTO: 10.8 % (ref 0–13.4)
NEUTROPHILS # BLD AUTO: 4.33 K/UL (ref 1.82–7.42)
NEUTROPHILS NFR BLD: 62.3 % (ref 44–72)
NRBC # BLD AUTO: 0 K/UL
NRBC BLD-RTO: 0 /100 WBC
PLATELET # BLD AUTO: 235 K/UL (ref 164–446)
PMV BLD AUTO: 10.3 FL (ref 9–12.9)
POTASSIUM SERPL-SCNC: 5 MMOL/L (ref 3.6–5.5)
PROT SERPL-MCNC: 7.5 G/DL (ref 6–8.2)
RBC # BLD AUTO: 5.89 M/UL (ref 4.7–6.1)
SODIUM SERPL-SCNC: 143 MMOL/L (ref 135–145)
WBC # BLD AUTO: 7 K/UL (ref 4.8–10.8)

## 2023-02-04 PROCEDURE — 83036 HEMOGLOBIN GLYCOSYLATED A1C: CPT

## 2023-02-04 PROCEDURE — 80053 COMPREHEN METABOLIC PANEL: CPT

## 2023-02-04 PROCEDURE — 85025 COMPLETE CBC W/AUTO DIFF WBC: CPT

## 2023-02-04 PROCEDURE — 36415 COLL VENOUS BLD VENIPUNCTURE: CPT

## 2023-02-17 ENCOUNTER — OFFICE VISIT (OUTPATIENT)
Dept: MEDICAL GROUP | Facility: MEDICAL CENTER | Age: 68
End: 2023-02-17
Payer: MEDICARE

## 2023-02-17 VITALS
SYSTOLIC BLOOD PRESSURE: 140 MMHG | WEIGHT: 205.03 LBS | DIASTOLIC BLOOD PRESSURE: 78 MMHG | HEIGHT: 70 IN | HEART RATE: 78 BPM | TEMPERATURE: 97.6 F | OXYGEN SATURATION: 97 % | BODY MASS INDEX: 29.35 KG/M2 | RESPIRATION RATE: 16 BRPM

## 2023-02-17 DIAGNOSIS — R80.9 TYPE 2 DIABETES MELLITUS WITH MICROALBUMINURIA, WITHOUT LONG-TERM CURRENT USE OF INSULIN (HCC): ICD-10-CM

## 2023-02-17 DIAGNOSIS — E11.29 TYPE 2 DIABETES MELLITUS WITH MICROALBUMINURIA, WITHOUT LONG-TERM CURRENT USE OF INSULIN (HCC): ICD-10-CM

## 2023-02-17 DIAGNOSIS — K76.0 HEPATIC STEATOSIS: ICD-10-CM

## 2023-02-17 DIAGNOSIS — N52.8 OTHER MALE ERECTILE DYSFUNCTION: ICD-10-CM

## 2023-02-17 DIAGNOSIS — I10 ESSENTIAL HYPERTENSION: ICD-10-CM

## 2023-02-17 PROCEDURE — 99214 OFFICE O/P EST MOD 30 MIN: CPT | Performed by: FAMILY MEDICINE

## 2023-02-17 RX ORDER — SILDENAFIL 50 MG/1
50 TABLET, FILM COATED ORAL
Qty: 10 TABLET | Refills: 3 | Status: SHIPPED | OUTPATIENT
Start: 2023-02-17 | End: 2023-10-13

## 2023-02-17 RX ORDER — EMPAGLIFLOZIN 25 MG/1
1 TABLET, FILM COATED ORAL DAILY
Qty: 90 TABLET | Refills: 3 | Status: SHIPPED | OUTPATIENT
Start: 2023-02-17 | End: 2023-09-07 | Stop reason: SDUPTHER

## 2023-02-17 RX ORDER — VALSARTAN 320 MG/1
320 TABLET ORAL DAILY
Qty: 90 TABLET | Refills: 3 | Status: SHIPPED | OUTPATIENT
Start: 2023-02-17 | End: 2023-09-07 | Stop reason: SDUPTHER

## 2023-02-17 ASSESSMENT — ENCOUNTER SYMPTOMS
SHORTNESS OF BREATH: 0
PALPITATIONS: 0
FEVER: 0
CHILLS: 0
COUGH: 0
WHEEZING: 0

## 2023-02-17 ASSESSMENT — FIBROSIS 4 INDEX: FIB4 SCORE: 1.48

## 2023-02-17 ASSESSMENT — PATIENT HEALTH QUESTIONNAIRE - PHQ9: CLINICAL INTERPRETATION OF PHQ2 SCORE: 0

## 2023-02-17 NOTE — ASSESSMENT & PLAN NOTE
Chronic problem, unstable, increase valsartan to 320 mg daily, continue amlodipine 5 mg daily and metoprolol 50 mg daily.  Follow-up in 1 month for blood pressure.

## 2023-02-17 NOTE — ASSESSMENT & PLAN NOTE
Chronic problem, stable, continue Viagra as needed.  Discussed with patient in detail about side effects of this medication.  Patient reports understanding.

## 2023-02-17 NOTE — PROGRESS NOTES
FAMILY MEDICINE VISIT                                                               Chief complaint::Diagnoses of Essential hypertension, Type 2 diabetes mellitus with microalbuminuria, without long-term current use of insulin (HCC), Hepatic steatosis, and Other male erectile dysfunction were pertinent to this visit.    History of present illness: Raf Spear is a 67 y.o. male who presented for lab follow-up, medication refill.    Problem   Other Male Erectile Dysfunction    He reports that he was taking Viagra before and requested refill for erectile dysfunction.     Hepatic Steatosis    Recent CT showed hepatic steatosis and recent liver function test came back elevated.  His last lipid panel was normal.  He is currently not on any statins.     Essential Hypertension    Has history of hypertension, currently on valsartan 160 mg, amlodipine 5 mg daily and metoprolol 50 mg daily.  Blood pressure today came back at 140/78.  He was previously on lisinopril which we discontinued at the visit.  No chest pain, palpitations, shortness of breath, lower extremity swelling.     Type 2 Diabetes Mellitus With Microalbuminuria, Without Long-Term Current Use of Insulin (Hcc)    Recently diagnosed with diabetes per patient.  A1c at 6.5.  He is on metformin 500 mg 2 times daily which she has been taking recently.    Lab Results   Component Value Date/Time    HBA1C 6.5 (H) 02/04/2023 09:57 AM                Review of systems:     Review of Systems   Constitutional:  Negative for chills and fever.   Respiratory:  Negative for cough, shortness of breath and wheezing.    Cardiovascular:  Negative for chest pain, palpitations and leg swelling.      Medications and Allergies:     Current Outpatient Medications   Medication Sig Dispense Refill    valsartan (DIOVAN) 320 MG tablet Take 1 Tablet by mouth every day. 90 Tablet 3    metFORMIN (GLUCOPHAGE) 500 MG Tab Take 1 Tablet by mouth 2 times a day with meals. 180 Tablet 3     "Empagliflozin (JARDIANCE) 25 MG Tab Take 1 Tablet by mouth every day. 90 Tablet 3    sildenafil citrate (VIAGRA) 50 MG tablet Take 1 Tablet by mouth 1 time a day as needed for Erectile Dysfunction. 10 Tablet 3    sertraline (ZOLOFT) 50 MG Tab Take 50 mg by mouth every day.      amLODIPine (NORVASC) 5 MG Tab TAKE 1 TABLET BY MOUTH EVERY DAY. FOR HIGH BLOOD PRESSURE 90 Tablet 3    omeprazole (PRILOSEC) 20 MG delayed-release capsule TAKE 1 CAPSULE BY MOUTH DAILY FOR GASTROESOPHAGEAL REFLUX DISEASE 90 Capsule 3    metoprolol SR (TOPROL XL) 50 MG TABLET SR 24 HR TAKE 1 TABLET BY MOUTH DAILY FOR HIGH BLOOD PRESSURE DISORDER 90 Tablet 3    traZODone (DESYREL) 50 MG Tab TAKE 1 TABLET BY MOUTH EVERY DAY IN THE EVENING 90 Tablet 3    Omega-3 Fatty Acids (FISH OIL) 1000 MG Cap capsule Take 1 Capsule by mouth 3 times a day with meals.      Zinc Sulfate (ZINC-220 PO) Take  by mouth.      Ascorbic Acid (VITAMIN C CR) 1000 MG Tab CR Take  by mouth.      vitamin D3 (CHOLECALCIFEROL) 1000 Unit (25 mcg) Tab Take 1 Tablet by mouth every day.      glucosamine Sulfate 500 MG Cap Take 1 Capsule by mouth 3 times a day with meals.      ASPIRIN LOW DOSE PO Take 5 mg by mouth.      Methylsulfonylmethane (MSM) 1000 MG Cap Take 1 Capsule by mouth.      Multiple Vitamin (MULTI-VITAMIN DAILY PO) Take  by mouth.       No current facility-administered medications for this visit.          Vitals:    BP (!) 140/78   Pulse 78   Temp 36.4 °C (97.6 °F)   Resp 16   Ht 1.778 m (5' 10\")   Wt 93 kg (205 lb 0.4 oz)   SpO2 97%  Body mass index is 29.42 kg/m².    Physical Exam:     Physical Exam  Constitutional:       Appearance: Normal appearance. He is well-developed and well-groomed.   HENT:      Head: Normocephalic and atraumatic.      Right Ear: External ear normal.      Left Ear: External ear normal.   Eyes:      General:         Right eye: No discharge.         Left eye: No discharge.      Conjunctiva/sclera: Conjunctivae normal. "   Cardiovascular:      Rate and Rhythm: Normal rate.   Pulmonary:      Effort: Pulmonary effort is normal. No respiratory distress.   Musculoskeletal:      Cervical back: Neck supple.   Skin:     Findings: No rash.   Neurological:      Mental Status: He is alert.   Psychiatric:         Mood and Affect: Mood and affect normal.         Behavior: Behavior normal.        Labs:  I reviewed with patient recent labs resulted on 2/4/2023.    Assessment/Plan:    HCC Gap Form    Diagnosis to address: E11.29, R80.9 - Type 2 diabetes mellitus with microalbuminuria, without long-term current use of insulin (HCC)  Assessment and plan: Chronic, stable.  Recent A1c at 6.5.  Start Jardiance 25 mg daily and continue metformin 500 mg 2 times daily.  Follow-up at least annually.  Last edited 02/17/23 12:36 PST by Ryan Carranza M.D.          Problem List Items Addressed This Visit       Type 2 diabetes mellitus with microalbuminuria, without long-term current use of insulin (HCC)     Chronic problem, stable, add Jardiance 25 mg daily and continue metformin 500 mg 2 times daily.         Relevant Medications    metFORMIN (GLUCOPHAGE) 500 MG Tab    Empagliflozin (JARDIANCE) 25 MG Tab    Other Relevant Orders    Comp Metabolic Panel    HEMOGLOBIN A1C    Lipid Profile    VITAMIN B12    MICROALBUMIN CREAT RATIO URINE    Other male erectile dysfunction     Chronic problem, stable, continue Viagra as needed.  Discussed with patient in detail about side effects of this medication.  Patient reports understanding.         Relevant Medications    sildenafil citrate (VIAGRA) 50 MG tablet    Hepatic steatosis     Chronic problem, unstable, continue to eat healthy diet and exercise.  Recheck liver function test in 6 months.         Essential hypertension     Chronic problem, unstable, increase valsartan to 320 mg daily, continue amlodipine 5 mg daily and metoprolol 50 mg daily.  Follow-up in 1 month for blood pressure.         Relevant  Medications    valsartan (DIOVAN) 320 MG tablet    sildenafil citrate (VIAGRA) 50 MG tablet        Please note that this dictation was created using voice recognition software. I have made every reasonable attempt to correct obvious errors, but I expect that there are errors of grammar and possibly content that I did not discover before finalizing the note.    Follow up in 1 month for blood pressure follow-up, 6-month lab follow-up.

## 2023-02-17 NOTE — ASSESSMENT & PLAN NOTE
Chronic problem, unstable, continue to eat healthy diet and exercise.  Recheck liver function test in 6 months.

## 2023-03-02 ENCOUNTER — OFFICE VISIT (OUTPATIENT)
Dept: DERMATOLOGY | Facility: IMAGING CENTER | Age: 68
End: 2023-03-02
Payer: MEDICARE

## 2023-03-02 DIAGNOSIS — L73.8 SEBACEOUS HYPERPLASIA: ICD-10-CM

## 2023-03-02 DIAGNOSIS — L81.4 LENTIGINES: ICD-10-CM

## 2023-03-02 DIAGNOSIS — D18.01 CHERRY ANGIOMA: ICD-10-CM

## 2023-03-02 DIAGNOSIS — D22.9 MULTIPLE NEVI: ICD-10-CM

## 2023-03-02 DIAGNOSIS — L57.0 ACTINIC KERATOSIS: ICD-10-CM

## 2023-03-02 DIAGNOSIS — L82.1 SK (SEBORRHEIC KERATOSIS): ICD-10-CM

## 2023-03-02 DIAGNOSIS — Z12.83 SKIN CANCER SCREENING: ICD-10-CM

## 2023-03-02 DIAGNOSIS — L85.1 STUCCO KERATOSES: ICD-10-CM

## 2023-03-02 DIAGNOSIS — L72.0 EPIDERMAL CYST: ICD-10-CM

## 2023-03-02 PROCEDURE — 17003 DESTRUCT PREMALG LES 2-14: CPT | Performed by: NURSE PRACTITIONER

## 2023-03-02 PROCEDURE — 99213 OFFICE O/P EST LOW 20 MIN: CPT | Mod: 25 | Performed by: NURSE PRACTITIONER

## 2023-03-02 PROCEDURE — 17000 DESTRUCT PREMALG LESION: CPT | Performed by: NURSE PRACTITIONER

## 2023-03-02 NOTE — PROGRESS NOTES
DERMATOLOGY NOTE  NEW VISIT       Chief complaint: Follow-Up ([facility])    Lesion on inner upper arm that's new     History of skin cancer: Yes, Details: BCC back, chest  and BCC rt ear, 04/2021 MOHS    History of precancers/actinic keratoses: Yes, Details: face chest arms   History of biopsies:Yes, Details: teen  History of blistering/severe sunburns:Yes, Details: lots of sun exporsure   Family history of skin cancer:No  Family history of atypical moles:No    No Known Allergies     MEDICATIONS:  Medications relevant to specialty reviewed.     REVIEW OF SYSTEMS:   Positive for skin (see HPI)  Negative for fevers and chills    EXAM:  There were no vitals taken for this visit.  Constitutional: Well-developed, well-nourished, and in no distress.     A total body skin exam was performed excluding the genitals per patient preference and including the following areas: head (including face), neck, chest, abdomen, groin/buttocks, back, bilateral upper extremities, and bilateral lower extremities with the following pertinent findings listed below and/or in assessment/plan.    -sun exposed skin of trunk and b/l upper, lower extremities and face with scattered clinically benign light brown reticulated macules all of which were morphologically similar and none of which were suspicious for skin cancer today on exam  -Several scattered 1-3mm bright red macules and thin papules on the trunk and extremities  -Several medium and dark brown stuck-on waxy papules scattered on the trunk and extremities  -Multiple tan medium and dark brown skin-colored macules papules scattered over the trunk >> extremities-All with benign-appearing pigment network patterns on dermoscopy  -epidermal cyst Lt mid back   -Scattered SH on face   -Stucco Keratosis Bilateral Lower Ext.   -AK Rt forehead temple    IMPRESSION / PLAN:    1. Actinic keratosis  CRYOTHERAPY:  Risks (including, but not limited to: skin discoloration, redness, blister, blood blister,  recurrence, need for further treatment, infection, scar) and benefits of cryotherapy discussed. Patient verbally agreed to proceed with treatment. 1 cryotherapy freeze thaw cycles of 10 seconds were applied to 4 lesions on areas as noted on exam with cryac. Patient tolerated procedure well. Aftercare instructions given--no specific care needed unless irritated during healing process, can apply Vaseline with small band-aid if needed.      2. Lentigines  - Benign-appearing nature of lesions discussed during exam.   - Advised to continue to monitor for any return to clinic for new or concerning changes.      3. Cherry angioma  - Benign-appearing nature of lesions discussed during exam.   - Advised to continue to monitor for any return to clinic for new or concerning changes.      4. SK (seborrheic keratosis)  - Benign-appearing nature of lesions discussed during exam.   - Advised to continue to monitor for any return to clinic for new or concerning changes.      5. Multiple nevi  - Benign-appearing nature of lesions discussed during exam.   - Advised to continue to monitor for any return to clinic for new or concerning changes.      6. Epidermal cyst  - Benign-appearing nature of lesions discussed during exam.   - Advised to continue to monitor for any return to clinic for new or concerning changes.      7. Sebaceous hyperplasia  - Benign-appearing nature of lesions discussed during exam.   - Advised to continue to monitor for any return to clinic for new or concerning changes.      8. Stucco keratoses  - Benign-appearing nature of lesions discussed during exam.   - Advised to continue to monitor for any return to clinic for new or concerning changes.      9. Skin cancer screening  Skin cancer education  discussed importance of sun protective clothing, eyewear in addition to the use of broad spectrum sunscreen with SPF 30 or greater, as well as need for reapplication ~every 2 hours when exposed to UVR  discussed  importance following up for any new or changing lesions as noted in handout given, but every 6 months exams in clinic in the setting of dermatologic history  ABCDE's of melanoma discussed/handout given          Discussed risks, benefits, alternative treatments as well as common side effects associated with prescribed treatment, Patient verbalized understanding and agrees with plan regarding the above      Please note that this dictation was created using voice recognition software. I have made every reasonable attempt to correct obvious errors, but I expect that there are errors of grammar and possibly content that I did not discover before finalizing the note.      Return to clinic in: Return in about 6 months (around 9/2/2023) for PAYTON. and as needed for any new or changing skin lesions.

## 2023-03-29 ENCOUNTER — OFFICE VISIT (OUTPATIENT)
Dept: MEDICAL GROUP | Facility: MEDICAL CENTER | Age: 68
End: 2023-03-29
Payer: MEDICARE

## 2023-03-29 VITALS
TEMPERATURE: 97.6 F | HEIGHT: 70 IN | RESPIRATION RATE: 17 BRPM | HEART RATE: 75 BPM | BODY MASS INDEX: 29.35 KG/M2 | DIASTOLIC BLOOD PRESSURE: 70 MMHG | OXYGEN SATURATION: 96 % | WEIGHT: 205.03 LBS | SYSTOLIC BLOOD PRESSURE: 138 MMHG

## 2023-03-29 DIAGNOSIS — I10 ESSENTIAL HYPERTENSION: ICD-10-CM

## 2023-03-29 PROCEDURE — 99214 OFFICE O/P EST MOD 30 MIN: CPT | Performed by: FAMILY MEDICINE

## 2023-03-29 RX ORDER — AMLODIPINE BESYLATE 2.5 MG/1
2.5 TABLET ORAL DAILY
Qty: 90 TABLET | Refills: 3 | Status: SHIPPED | OUTPATIENT
Start: 2023-03-29 | End: 2023-10-23 | Stop reason: SDUPTHER

## 2023-03-29 ASSESSMENT — ENCOUNTER SYMPTOMS
PALPITATIONS: 0
CHILLS: 0
FEVER: 0

## 2023-03-29 ASSESSMENT — FIBROSIS 4 INDEX: FIB4 SCORE: 1.48

## 2023-03-29 NOTE — ASSESSMENT & PLAN NOTE
Chronic problem, unstable, systolic blood pressure is elevated, diastolic is much better controlled.  Counseled regarding making dietary modification including reducing salt intake, caffeine intake in diet and exercise.  Continue to monitor blood pressure for the next 2 weeks.  If systolic blood pressure is borderline, recommended to add amlodipine 2.5 mg to 5 mg for total of 7.5 mg daily.  Continue valsartan 320 mg daily and metoprolol 50 mg daily.  If blood pressure is not getting better, follow-up in office.

## 2023-03-29 NOTE — PROGRESS NOTES
FAMILY MEDICINE VISIT                                                               Chief complaint::The encounter diagnosis was Essential hypertension.    History of present illness: Raf Spear is a 67 y.o. male who presented for blood pressure follow-up.    Problem   Essential Hypertension     has history of hypertension, currently on valsartan 320 mg, amlodipine 5 mg daily and metoprolol 50 mg daily.  Home blood pressure readings has been between 120s to 140s/70s to 80s.  Blood pressure got mildly better after increasing valsartan dose at last visit.  No chest pain, palpitations, shortness of breath, lower leg swelling.        Review of systems:     Review of Systems   Constitutional:  Negative for chills, fever and malaise/fatigue.   Cardiovascular:  Negative for chest pain, palpitations and leg swelling.      Medications and Allergies:     Current Outpatient Medications   Medication Sig Dispense Refill    amLODIPine (NORVASC) 2.5 MG Tab Take 1 Tablet by mouth every day. Along with amlodipine 5 mg for today of 7.5 mg/day 90 Tablet 3    valsartan (DIOVAN) 320 MG tablet Take 1 Tablet by mouth every day. 90 Tablet 3    metFORMIN (GLUCOPHAGE) 500 MG Tab Take 1 Tablet by mouth 2 times a day with meals. 180 Tablet 3    Empagliflozin (JARDIANCE) 25 MG Tab Take 1 Tablet by mouth every day. 90 Tablet 3    sertraline (ZOLOFT) 50 MG Tab Take 50 mg by mouth every day.      sildenafil citrate (VIAGRA) 50 MG tablet Take 1 Tablet by mouth 1 time a day as needed for Erectile Dysfunction. 10 Tablet 3    amLODIPine (NORVASC) 5 MG Tab TAKE 1 TABLET BY MOUTH EVERY DAY. FOR HIGH BLOOD PRESSURE 90 Tablet 3    omeprazole (PRILOSEC) 20 MG delayed-release capsule TAKE 1 CAPSULE BY MOUTH DAILY FOR GASTROESOPHAGEAL REFLUX DISEASE 90 Capsule 3    metoprolol SR (TOPROL XL) 50 MG TABLET SR 24 HR TAKE 1 TABLET BY MOUTH DAILY FOR HIGH BLOOD PRESSURE DISORDER 90 Tablet 3    traZODone (DESYREL) 50 MG Tab TAKE 1 TABLET BY MOUTH EVERY DAY  "IN THE EVENING 90 Tablet 3    Omega-3 Fatty Acids (FISH OIL) 1000 MG Cap capsule Take 1 Capsule by mouth 3 times a day with meals.      Zinc Sulfate (ZINC-220 PO) Take  by mouth.      Ascorbic Acid (VITAMIN C CR) 1000 MG Tab CR Take  by mouth.      vitamin D3 (CHOLECALCIFEROL) 1000 Unit (25 mcg) Tab Take 1 Tablet by mouth every day.      glucosamine Sulfate 500 MG Cap Take 1 Capsule by mouth 3 times a day with meals.      ASPIRIN LOW DOSE PO Take 5 mg by mouth.      Methylsulfonylmethane (MSM) 1000 MG Cap Take 1 Capsule by mouth.      Multiple Vitamin (MULTI-VITAMIN DAILY PO) Take  by mouth.       No current facility-administered medications for this visit.          Vitals:    /70   Pulse 75   Temp 36.4 °C (97.6 °F)   Resp 17   Ht 1.778 m (5' 10\")   Wt 93 kg (205 lb 0.4 oz)   SpO2 96%  Body mass index is 29.42 kg/m².    Physical Exam:     Physical Exam  Constitutional:       Appearance: Normal appearance. He is well-developed and well-groomed.   HENT:      Head: Normocephalic and atraumatic.      Right Ear: External ear normal.      Left Ear: External ear normal.   Eyes:      General:         Right eye: No discharge.         Left eye: No discharge.      Conjunctiva/sclera: Conjunctivae normal.   Cardiovascular:      Rate and Rhythm: Normal rate.   Pulmonary:      Effort: Pulmonary effort is normal. No respiratory distress.   Musculoskeletal:      Cervical back: Neck supple.   Skin:     Findings: No rash.   Neurological:      Mental Status: He is alert.   Psychiatric:         Mood and Affect: Mood and affect normal.         Behavior: Behavior normal.          Assessment/Plan:         Problem List Items Addressed This Visit       Essential hypertension     Chronic problem, unstable, systolic blood pressure is elevated, diastolic is much better controlled.  Counseled regarding making dietary modification including reducing salt intake, caffeine intake in diet and exercise.  Continue to monitor blood " pressure for the next 2 weeks.  If systolic blood pressure is borderline, recommended to add amlodipine 2.5 mg to 5 mg for total of 7.5 mg daily.  Continue valsartan 320 mg daily and metoprolol 50 mg daily.  If blood pressure is not getting better, follow-up in office.         Relevant Medications    amLODIPine (NORVASC) 2.5 MG Tab        Please note that this dictation was created using voice recognition software. I have made every reasonable attempt to correct obvious errors, but I expect that there are errors of grammar and possibly content that I did not discover before finalizing the note.    Follow up in August 2023 for lab follow-up, sooner if blood pressure is not getting under control

## 2023-05-04 ENCOUNTER — TELEPHONE (OUTPATIENT)
Dept: HEALTH INFORMATION MANAGEMENT | Facility: OTHER | Age: 68
End: 2023-05-04
Payer: MEDICARE

## 2023-06-05 ENCOUNTER — DOCUMENTATION (OUTPATIENT)
Dept: HEALTH INFORMATION MANAGEMENT | Facility: OTHER | Age: 68
End: 2023-06-05
Payer: MEDICARE

## 2023-08-23 ENCOUNTER — HOSPITAL ENCOUNTER (OUTPATIENT)
Dept: LAB | Facility: MEDICAL CENTER | Age: 68
End: 2023-08-23
Attending: FAMILY MEDICINE
Payer: MEDICARE

## 2023-08-23 DIAGNOSIS — E11.29 TYPE 2 DIABETES MELLITUS WITH MICROALBUMINURIA, WITHOUT LONG-TERM CURRENT USE OF INSULIN (HCC): ICD-10-CM

## 2023-08-23 DIAGNOSIS — R80.9 TYPE 2 DIABETES MELLITUS WITH MICROALBUMINURIA, WITHOUT LONG-TERM CURRENT USE OF INSULIN (HCC): ICD-10-CM

## 2023-08-23 LAB
ALBUMIN SERPL BCP-MCNC: 4.4 G/DL (ref 3.2–4.9)
ALBUMIN/GLOB SERPL: 1.6 G/DL
ALP SERPL-CCNC: 59 U/L (ref 30–99)
ALT SERPL-CCNC: 99 U/L (ref 2–50)
ANION GAP SERPL CALC-SCNC: 10 MMOL/L (ref 7–16)
AST SERPL-CCNC: 53 U/L (ref 12–45)
BILIRUB SERPL-MCNC: 0.3 MG/DL (ref 0.1–1.5)
BUN SERPL-MCNC: 21 MG/DL (ref 8–22)
CALCIUM ALBUM COR SERPL-MCNC: 9.3 MG/DL (ref 8.5–10.5)
CALCIUM SERPL-MCNC: 9.6 MG/DL (ref 8.5–10.5)
CHLORIDE SERPL-SCNC: 103 MMOL/L (ref 96–112)
CHOLEST SERPL-MCNC: 116 MG/DL (ref 100–199)
CO2 SERPL-SCNC: 26 MMOL/L (ref 20–33)
CREAT SERPL-MCNC: 1.04 MG/DL (ref 0.5–1.4)
EST. AVERAGE GLUCOSE BLD GHB EST-MCNC: 151 MG/DL
FASTING STATUS PATIENT QL REPORTED: NORMAL
GFR SERPLBLD CREATININE-BSD FMLA CKD-EPI: 77 ML/MIN/1.73 M 2
GLOBULIN SER CALC-MCNC: 2.8 G/DL (ref 1.9–3.5)
GLUCOSE SERPL-MCNC: 137 MG/DL (ref 65–99)
HBA1C MFR BLD: 6.9 % (ref 4–5.6)
HDLC SERPL-MCNC: 29 MG/DL
LDLC SERPL CALC-MCNC: 58 MG/DL
POTASSIUM SERPL-SCNC: 4.7 MMOL/L (ref 3.6–5.5)
PROT SERPL-MCNC: 7.2 G/DL (ref 6–8.2)
SODIUM SERPL-SCNC: 139 MMOL/L (ref 135–145)
TRIGL SERPL-MCNC: 143 MG/DL (ref 0–149)
VIT B12 SERPL-MCNC: 1509 PG/ML (ref 211–911)

## 2023-08-23 PROCEDURE — 36415 COLL VENOUS BLD VENIPUNCTURE: CPT

## 2023-08-23 PROCEDURE — 80061 LIPID PANEL: CPT

## 2023-08-23 PROCEDURE — 82607 VITAMIN B-12: CPT

## 2023-08-23 PROCEDURE — 83036 HEMOGLOBIN GLYCOSYLATED A1C: CPT

## 2023-08-23 PROCEDURE — 80053 COMPREHEN METABOLIC PANEL: CPT

## 2023-08-23 PROCEDURE — 82570 ASSAY OF URINE CREATININE: CPT

## 2023-08-23 PROCEDURE — 82043 UR ALBUMIN QUANTITATIVE: CPT

## 2023-08-24 LAB
CREAT UR-MCNC: 159.72 MG/DL
MICROALBUMIN UR-MCNC: 74.1 MG/DL
MICROALBUMIN/CREAT UR: 464 MG/G (ref 0–30)

## 2023-09-05 ENCOUNTER — OFFICE VISIT (OUTPATIENT)
Dept: DERMATOLOGY | Facility: IMAGING CENTER | Age: 68
End: 2023-09-05
Payer: MEDICARE

## 2023-09-05 DIAGNOSIS — L82.1 SK (SEBORRHEIC KERATOSIS): ICD-10-CM

## 2023-09-05 DIAGNOSIS — L73.8 SEBACEOUS HYPERPLASIA: ICD-10-CM

## 2023-09-05 DIAGNOSIS — L85.1 STUCCO KERATOSES: ICD-10-CM

## 2023-09-05 DIAGNOSIS — L81.4 LENTIGINES: ICD-10-CM

## 2023-09-05 DIAGNOSIS — E11.29 TYPE 2 DIABETES MELLITUS WITH MICROALBUMINURIA, WITHOUT LONG-TERM CURRENT USE OF INSULIN (HCC): ICD-10-CM

## 2023-09-05 DIAGNOSIS — R80.9 TYPE 2 DIABETES MELLITUS WITH MICROALBUMINURIA, WITHOUT LONG-TERM CURRENT USE OF INSULIN (HCC): ICD-10-CM

## 2023-09-05 DIAGNOSIS — L72.0 EPIDERMAL CYST: ICD-10-CM

## 2023-09-05 DIAGNOSIS — Z12.83 SKIN CANCER SCREENING: ICD-10-CM

## 2023-09-05 DIAGNOSIS — D22.9 MULTIPLE NEVI: ICD-10-CM

## 2023-09-05 DIAGNOSIS — D18.01 CHERRY ANGIOMA: ICD-10-CM

## 2023-09-05 PROCEDURE — 99212 OFFICE O/P EST SF 10 MIN: CPT | Performed by: NURSE PRACTITIONER

## 2023-09-05 NOTE — PROGRESS NOTES
DERMATOLOGY NOTE  FOLLOW UP VISIT       Chief complaint: Follow-Up ([facility] 6 mth)      Pt reports poss cyst on back, not bothersome     History of skin cancer: Yes, Details: BCC back, chest  and BCC rt ear, 04/2021 MOHS    History of precancers/actinic keratoses: Yes, Details: face chest arms   History of biopsies:Yes, Details: teen  History of blistering/severe sunburns:Yes, Details: lots of sun exporsure   Family history of skin cancer:No  Family history of atypical moles:No    No Known Allergies     MEDICATIONS:  Medications relevant to specialty reviewed.     REVIEW OF SYSTEMS:   Positive for skin (see HPI)  Negative for fevers and chills    EXAM:  There were no vitals taken for this visit.  Constitutional: Well-developed, well-nourished, and in no distress.     A total body skin exam was performed excluding the genitals per patient preference and including the following areas: head (including face), neck, chest, abdomen, groin/buttocks, back, bilateral upper extremities, and bilateral lower extremities with the following pertinent findings listed below and/or in assessment/plan.    -sun exposed skin of trunk and b/l upper, lower extremities and face with scattered clinically benign light brown reticulated macules all of which were morphologically similar and none of which were suspicious for skin cancer today on exam  -Several scattered 1-3mm bright red macules and thin papules on the trunk and extremities  -Several medium and dark brown stuck-on waxy papules scattered on the trunk and extremities  -Multiple tan medium and dark brown skin-colored macules papules scattered over the trunk >> extremities-All with benign-appearing pigment network patterns on dermoscopy  -epidermal cyst Lt mid back   -Scattered SH on face   -Stucco Keratosis Bilateral Lower Ext.   -Monofilament testing with a 10 gram force: sensation intact: intact bilaterally  Visual Inspection: Feet without maceration, ulcers, fissures.  Pedal  pulses: intact bilaterally      IMPRESSION / PLAN:    1. Lentigines  - Benign-appearing nature of lesions discussed during exam.   - Advised to continue to monitor for any return to clinic for new or concerning changes.      2. Cherry angioma  - Benign-appearing nature of lesions discussed during exam.   - Advised to continue to monitor for any return to clinic for new or concerning changes.      3. SK (seborrheic keratosis)  - Benign-appearing nature of lesions discussed during exam.   - Advised to continue to monitor for any return to clinic for new or concerning changes.      4. Multiple nevi  - Benign-appearing nature of lesions discussed during exam.   - Advised to continue to monitor for any return to clinic for new or concerning changes.      5. Epidermal cyst  - Benign-appearing nature of lesions discussed during exam.   - Advised to continue to monitor for any return to clinic for new or concerning changes.      6. Sebaceous hyperplasia  - Benign-appearing nature of lesions discussed during exam.   - Advised to continue to monitor for any return to clinic for new or concerning changes.      7. Stucco keratoses  - Benign-appearing nature of lesions discussed during exam.   - Advised to continue to monitor for any return to clinic for new or concerning changes.      8. Type 2 diabetes mellitus with microalbuminuria, without long-term current use of insulin (HCC)  Continue care per PCP  - Diabetic Monofilament Lower Extremity Exam    9. Skin cancer screening  Skin cancer education  discussed importance of sun protective clothing, eyewear in addition to the use of broad spectrum sunscreen with SPF 30 or greater, as well as need for reapplication ~every 2 hours when exposed to UVR/handout given  discussed importance following up for any new or changing lesions as noted in handout given, but every 12 months exams in clinic in the setting of dermatologic history  ABCDE's of melanoma discussed/handout given      I  have performed a physical exam and reviewed and updated ROS and Plan today (9/5/2023). In review of dermatology visit (3/2/2023), there are no changes except as documented above.     Please note that this dictation was created using voice recognition software. I have made every reasonable attempt to correct obvious errors, but I expect that there are errors of grammar and possibly content that I did not discover before finalizing the note.      Return to clinic in: Return in about 1 year (around 9/5/2024) for PAYTON. and as needed for any new or changing skin lesions.

## 2023-09-07 ENCOUNTER — OFFICE VISIT (OUTPATIENT)
Dept: MEDICAL GROUP | Facility: MEDICAL CENTER | Age: 68
End: 2023-09-07
Payer: MEDICARE

## 2023-09-07 VITALS
DIASTOLIC BLOOD PRESSURE: 70 MMHG | HEART RATE: 69 BPM | RESPIRATION RATE: 16 BRPM | SYSTOLIC BLOOD PRESSURE: 136 MMHG | OXYGEN SATURATION: 94 % | TEMPERATURE: 97.8 F | BODY MASS INDEX: 29.74 KG/M2 | HEIGHT: 68 IN | WEIGHT: 196.21 LBS

## 2023-09-07 DIAGNOSIS — E11.29 TYPE 2 DIABETES MELLITUS WITH MICROALBUMINURIA, WITHOUT LONG-TERM CURRENT USE OF INSULIN (HCC): ICD-10-CM

## 2023-09-07 DIAGNOSIS — K76.0 HEPATIC STEATOSIS: ICD-10-CM

## 2023-09-07 DIAGNOSIS — R80.9 TYPE 2 DIABETES MELLITUS WITH MICROALBUMINURIA, WITHOUT LONG-TERM CURRENT USE OF INSULIN (HCC): ICD-10-CM

## 2023-09-07 DIAGNOSIS — I10 ESSENTIAL HYPERTENSION: ICD-10-CM

## 2023-09-07 PROCEDURE — 3075F SYST BP GE 130 - 139MM HG: CPT | Performed by: FAMILY MEDICINE

## 2023-09-07 PROCEDURE — 3078F DIAST BP <80 MM HG: CPT | Performed by: FAMILY MEDICINE

## 2023-09-07 PROCEDURE — 99214 OFFICE O/P EST MOD 30 MIN: CPT | Performed by: FAMILY MEDICINE

## 2023-09-07 RX ORDER — EMPAGLIFLOZIN 25 MG/1
1 TABLET, FILM COATED ORAL DAILY
Qty: 100 TABLET | Refills: 3 | Status: SHIPPED | OUTPATIENT
Start: 2023-09-07

## 2023-09-07 RX ORDER — VALSARTAN 320 MG/1
320 TABLET ORAL DAILY
Qty: 90 TABLET | Refills: 3 | Status: SHIPPED | OUTPATIENT
Start: 2023-09-07 | End: 2023-09-07

## 2023-09-07 RX ORDER — VALSARTAN 320 MG/1
320 TABLET ORAL DAILY
Qty: 100 TABLET | Refills: 3 | Status: SHIPPED | OUTPATIENT
Start: 2023-09-07

## 2023-09-07 RX ORDER — EMPAGLIFLOZIN 25 MG/1
1 TABLET, FILM COATED ORAL DAILY
Qty: 90 TABLET | Refills: 3 | Status: SHIPPED | OUTPATIENT
Start: 2023-09-07 | End: 2023-09-07

## 2023-09-07 ASSESSMENT — ENCOUNTER SYMPTOMS
FEVER: 0
CHILLS: 0
PALPITATIONS: 0

## 2023-09-07 ASSESSMENT — FIBROSIS 4 INDEX: FIB4 SCORE: 1.59

## 2023-09-07 NOTE — PROGRESS NOTES
FAMILY MEDICINE VISIT                                                               Chief complaint::Diagnoses of Type 2 diabetes mellitus with microalbuminuria, without long-term current use of insulin (HCC), Hepatic steatosis, and Essential hypertension were pertinent to this visit.    History of present illness: Raf Spear is a 68 y.o. male who presented for lab follow-up.    Problem   Hepatic Steatosis    Recent CT showed hepatic steatosis and recent liver function test came back elevated.  His last lipid panel was normal.  He is currently not on any statins.  Cholesterols are normal without statins.  HDL is mildly low at 29.    Lab Results   Component Value Date/Time    CHOLSTRLTOT 116 08/23/2023 0911    TRIGLYCERIDE 143 08/23/2023 0911    HDL 29 (A) 08/23/2023 0911    LDL 58 08/23/2023 0911        Essential Hypertension     has history of hypertension, currently on valsartan 320 mg, amlodipine 5 mg daily and metoprolol 50 mg daily. BP today: 136/70. No chest pain, palpitations, shortness of breath, lower leg swelling.     Type 2 Diabetes Mellitus With Microalbuminuria, Without Long-Term Current Use of Insulin ([facility])      A1c at 6.9 he is on metformin 500 mg 2 times daily which she has been taking recently.  He is on Jardiance 25 mg daily.  He reports that Jardiance medication is expensive, about $400 as he is in [facility] for medications    Lab Results   Component Value Date/Time    HBA1C 6.9 (H) 08/23/2023 09:11 AM                Review of systems:     Review of Systems   Constitutional:  Negative for chills and fever.   Cardiovascular:  Negative for chest pain, palpitations and leg swelling.        Medications and Allergies:     Current Outpatient Medications   Medication Sig Dispense Refill    Empagliflozin (JARDIANCE) 25 MG Tab Take 1 Tablet by mouth every day. 100 Tablet 3    valsartan (DIOVAN) 320 MG tablet Take 1 Tablet by mouth every day. 100 Tablet 3    sertraline (ZOLOFT) 50 MG Tab TAKE 1  "TABLET BY MOUTH EVERY DAY 90 Tablet 3    amLODIPine (NORVASC) 2.5 MG Tab Take 1 Tablet by mouth every day. Along with amlodipine 5 mg for today of 7.5 mg/day 90 Tablet 3    metFORMIN (GLUCOPHAGE) 500 MG Tab Take 1 Tablet by mouth 2 times a day with meals. 180 Tablet 3    sildenafil citrate (VIAGRA) 50 MG tablet Take 1 Tablet by mouth 1 time a day as needed for Erectile Dysfunction. 10 Tablet 3    amLODIPine (NORVASC) 5 MG Tab TAKE 1 TABLET BY MOUTH EVERY DAY. FOR HIGH BLOOD PRESSURE 90 Tablet 3    omeprazole (PRILOSEC) 20 MG delayed-release capsule TAKE 1 CAPSULE BY MOUTH DAILY FOR GASTROESOPHAGEAL REFLUX DISEASE 90 Capsule 3    metoprolol SR (TOPROL XL) 50 MG TABLET SR 24 HR TAKE 1 TABLET BY MOUTH DAILY FOR HIGH BLOOD PRESSURE DISORDER 90 Tablet 3    Omega-3 Fatty Acids (FISH OIL) 1000 MG Cap capsule Take 1 Capsule by mouth 3 times a day with meals.      Zinc Sulfate (ZINC-220 PO) Take  by mouth.      Ascorbic Acid (VITAMIN C CR) 1000 MG Tab CR Take  by mouth.      vitamin D3 (CHOLECALCIFEROL) 1000 Unit (25 mcg) Tab Take 1 Tablet by mouth every day.      glucosamine Sulfate 500 MG Cap Take 1 Capsule by mouth 3 times a day with meals.      ASPIRIN LOW DOSE PO Take 5 mg by mouth.      Methylsulfonylmethane (MSM) 1000 MG Cap Take 1 Capsule by mouth.      Multiple Vitamin (MULTI-VITAMIN DAILY PO) Take  by mouth.       No current facility-administered medications for this visit.          Vitals:    /70   Pulse 69   Temp 36.6 °C (97.8 °F)   Resp 16   Ht 1.727 m (5' 8\")   Wt 89 kg (196 lb 3.4 oz)   SpO2 94%  Body mass index is 29.83 kg/m².    Physical Exam:     Physical Exam  Constitutional:       Appearance: Normal appearance. He is well-developed and well-groomed.   HENT:      Head: Normocephalic and atraumatic.      Right Ear: External ear normal.      Left Ear: External ear normal.   Eyes:      General:         Right eye: No discharge.         Left eye: No discharge.      Conjunctiva/sclera: Conjunctivae " normal.   Cardiovascular:      Rate and Rhythm: Normal rate.   Pulmonary:      Effort: Pulmonary effort is normal. No respiratory distress.   Musculoskeletal:      Cervical back: Neck supple.   Skin:     Findings: No rash.   Neurological:      Mental Status: He is alert.   Psychiatric:         Mood and Affect: Mood and affect normal.         Behavior: Behavior normal.          Labs:  I reviewed with patient recent labs resulted on 8/23/2023.    Assessment/Plan:         Problem List Items Addressed This Visit       Essential hypertension     Chronic problem, stable, continue same medication regimen as listed in HPI.         Relevant Medications    valsartan (DIOVAN) 320 MG tablet    Type 2 diabetes mellitus with microalbuminuria, without long-term current use of insulin (HCC)     Chronic problem, unstable, referral to dietitian for dietary modification.  Continue metformin 500 mg 2 times daily and Jardiance 10 mg daily.  We will send medication to [facility].  Eye exam was not successful in office, follow-up with ophthalmologist.         Relevant Medications    Empagliflozin (JARDIANCE) 25 MG Tab    Other Relevant Orders    POCT Retinal Eye Exam    HEMOGLOBIN A1C    VITAMIN B12    Referral to Ophthalmology    Referral to Nutrition Services    Hepatic steatosis     Chronic problem, unstable, monitor liver function closely         Relevant Orders    Comp Metabolic Panel        Please note that this dictation was created using voice recognition software. I have made every reasonable attempt to correct obvious errors, but I expect that there are errors of grammar and possibly content that I did not discover before finalizing the note.    Follow up in 01/2024 for lab follow up.

## 2023-09-07 NOTE — ASSESSMENT & PLAN NOTE
Chronic problem, unstable, referral to dietitian for dietary modification.  Continue metformin 500 mg 2 times daily and Jardiance 10 mg daily.  We will send medication to [facility].  Eye exam was not successful in office, follow-up with ophthalmologist.

## 2023-09-25 PROCEDURE — RXMED WILLOW AMBULATORY MEDICATION CHARGE: Performed by: FAMILY MEDICINE

## 2023-09-27 ENCOUNTER — PHARMACY VISIT (OUTPATIENT)
Dept: PHARMACY | Facility: MEDICAL CENTER | Age: 68
End: 2023-09-27
Payer: COMMERCIAL

## 2023-10-13 ENCOUNTER — OFFICE VISIT (OUTPATIENT)
Dept: URGENT CARE | Facility: PHYSICIAN GROUP | Age: 68
End: 2023-10-13
Payer: MEDICARE

## 2023-10-13 VITALS
TEMPERATURE: 98.7 F | SYSTOLIC BLOOD PRESSURE: 110 MMHG | OXYGEN SATURATION: 97 % | HEART RATE: 86 BPM | WEIGHT: 200 LBS | BODY MASS INDEX: 28.63 KG/M2 | DIASTOLIC BLOOD PRESSURE: 70 MMHG | HEIGHT: 70 IN | RESPIRATION RATE: 18 BRPM

## 2023-10-13 DIAGNOSIS — E11.29 TYPE 2 DIABETES MELLITUS WITH MICROALBUMINURIA, WITHOUT LONG-TERM CURRENT USE OF INSULIN (HCC): ICD-10-CM

## 2023-10-13 DIAGNOSIS — R80.9 TYPE 2 DIABETES MELLITUS WITH MICROALBUMINURIA, WITHOUT LONG-TERM CURRENT USE OF INSULIN (HCC): ICD-10-CM

## 2023-10-13 DIAGNOSIS — L08.9 SKIN INFECTION: ICD-10-CM

## 2023-10-13 DIAGNOSIS — I10 ESSENTIAL HYPERTENSION: ICD-10-CM

## 2023-10-13 PROCEDURE — 99214 OFFICE O/P EST MOD 30 MIN: CPT | Performed by: FAMILY MEDICINE

## 2023-10-13 PROCEDURE — 3078F DIAST BP <80 MM HG: CPT | Performed by: FAMILY MEDICINE

## 2023-10-13 PROCEDURE — 3074F SYST BP LT 130 MM HG: CPT | Performed by: FAMILY MEDICINE

## 2023-10-13 RX ORDER — CEPHALEXIN 500 MG/1
1000 CAPSULE ORAL 2 TIMES DAILY
Qty: 20 CAPSULE | Refills: 0 | Status: SHIPPED | OUTPATIENT
Start: 2023-10-13 | End: 2023-10-18

## 2023-10-13 ASSESSMENT — ENCOUNTER SYMPTOMS: ROS SKIN COMMENTS: FINGER INFECTION

## 2023-10-13 ASSESSMENT — FIBROSIS 4 INDEX: FIB4 SCORE: 1.59

## 2023-10-13 NOTE — PROGRESS NOTES
Subjective     Preston Spear is a 68 y.o. male who presents with Joint Injection (Pointer finger infection X 5 days. Pain/discomfort (2/10) Swollen. Finger appears black and inflammed. )    - This is a very pleasant 68 y.o. who has come to the walk-in clinic today for ~1 wk ago got a blister on Rt index finger, slowly healing and past 4-5 days gotten red and some swelling. Last tetanus booster 2015    Hx DM, HgA1C 6.9 8/23    Hx HTN, stable on meds. GFR 77 8/23    ALLERGIES:  Patient has no known allergies.     PMH:  Past Medical History:   Diagnosis Date    Cough     Daytime sleepiness     Diabetes (HCC)     Sami measles     Hearing difficulty     Hyperlipidemia     Hypertension     Influenza     Morning headache     Mumps     Snoring     Wears glasses         PSH:  Past Surgical History:   Procedure Laterality Date    LAMINOTOMY      LUMBAR DECOMPRESSION      TONSILLECTOMY         MEDS:    Current Outpatient Medications:     cephALEXin (KEFLEX) 500 MG Cap, Take 2 Capsules by mouth 2 times a day for 5 days., Disp: 20 Capsule, Rfl: 0    Empagliflozin (JARDIANCE) 25 MG Tab, Take 1 Tablet by mouth every day., Disp: 100 Tablet, Rfl: 3    valsartan (DIOVAN) 320 MG tablet, Take 1 Tablet by mouth every day., Disp: 100 Tablet, Rfl: 3    sertraline (ZOLOFT) 50 MG Tab, TAKE 1 TABLET BY MOUTH EVERY DAY, Disp: 90 Tablet, Rfl: 3    amLODIPine (NORVASC) 2.5 MG Tab, Take 1 Tablet by mouth every day. Along with amlodipine 5 mg for today of 7.5 mg/day, Disp: 90 Tablet, Rfl: 3    metFORMIN (GLUCOPHAGE) 500 MG Tab, Take 1 Tablet by mouth 2 times a day with meals., Disp: 180 Tablet, Rfl: 3    amLODIPine (NORVASC) 5 MG Tab, TAKE 1 TABLET BY MOUTH EVERY DAY. FOR HIGH BLOOD PRESSURE, Disp: 90 Tablet, Rfl: 3    omeprazole (PRILOSEC) 20 MG delayed-release capsule, TAKE 1 CAPSULE BY MOUTH DAILY FOR GASTROESOPHAGEAL REFLUX DISEASE, Disp: 90 Capsule, Rfl: 3    metoprolol SR (TOPROL XL) 50 MG TABLET SR 24 HR, TAKE 1 TABLET BY MOUTH DAILY  "FOR HIGH BLOOD PRESSURE DISORDER, Disp: 90 Tablet, Rfl: 3    Omega-3 Fatty Acids (FISH OIL) 1000 MG Cap capsule, Take 1 Capsule by mouth 3 times a day with meals., Disp: , Rfl:     Zinc Sulfate (ZINC-220 PO), Take  by mouth., Disp: , Rfl:     Ascorbic Acid (VITAMIN C CR) 1000 MG Tab CR, Take  by mouth., Disp: , Rfl:     vitamin D3 (CHOLECALCIFEROL) 1000 Unit (25 mcg) Tab, Take 1 Tablet by mouth every day., Disp: , Rfl:     glucosamine Sulfate 500 MG Cap, Take 1 Capsule by mouth 3 times a day with meals., Disp: , Rfl:     ASPIRIN LOW DOSE PO, Take 5 mg by mouth., Disp: , Rfl:     Methylsulfonylmethane (MSM) 1000 MG Cap, Take 1 Capsule by mouth., Disp: , Rfl:     Multiple Vitamin (MULTI-VITAMIN DAILY PO), Take  by mouth., Disp: , Rfl:     ** I have documented what I find to be significant in regards to past medical, social, family and surgical history  in my HPI or under PMH/PSH/FH review section, otherwise it is noncontributory **                 HPI    Review of Systems   Skin:         Finger infection   All other systems reviewed and are negative.             Objective     /70 (BP Location: Right arm, Patient Position: Sitting, BP Cuff Size: Adult)   Pulse 86   Temp 37.1 °C (98.7 °F) (Temporal)   Resp 18   Ht 1.778 m (5' 10\")   Wt 90.7 kg (200 lb)   SpO2 97%   BMI 28.70 kg/m²      Physical Exam  Vitals and nursing note reviewed.   Constitutional:       General: He is not in acute distress.     Appearance: Normal appearance. He is well-developed.   HENT:      Head: Normocephalic.   Pulmonary:      Effort: Pulmonary effort is normal. No respiratory distress.   Musculoskeletal:        Hands:       Comments: Rt Index Finger: distal radial side there is healing blister w/ some edema/erythema and mild ttp. Good srom   Neurological:      Mental Status: He is alert.      Motor: No abnormal muscle tone.   Psychiatric:         Mood and Affect: Mood normal.         Behavior: Behavior normal.                       "       Assessment & Plan     1. Skin infection  cephALEXin (KEFLEX) 500 MG Cap      2. Type 2 diabetes mellitus with microalbuminuria, without long-term current use of insulin (HCC)        3. Essential hypertension            - Dx, plan & d/c instructions discussed   - rest and elevate the finger  - warm epsom soaks 6-8x/day x 2 days    Follow up with your regular primary care providers office in a week for a recheck on today's visit. ER if not improving in 2-3 days or if feeling/getting worse.    Patient left in stable condition     Pertinent prior lab work and/or imaging studies in Epic have been reviewed by me today on day of this visit and taken into account for my treatment and plan today    Pertinent PMH/PSH and/or chronic conditions and medications if any were reviewed today and taken into account for my treatment and plan today    Pertinent prior office visit notes in [facility] have been reviewed by me today on day of this visit.    Please note that this dictation may have been created using voice recognition software, if so I have made every reasonable attempt to correct obvious errors, but I expect that there are errors of grammar and possibly content that I did not discover before finalizing the note.

## 2023-10-18 ENCOUNTER — APPOINTMENT (OUTPATIENT)
Dept: MEDICAL GROUP | Facility: MEDICAL CENTER | Age: 68
End: 2023-10-18
Payer: MEDICARE

## 2023-10-20 ENCOUNTER — OFFICE VISIT (OUTPATIENT)
Dept: URGENT CARE | Facility: PHYSICIAN GROUP | Age: 68
End: 2023-10-20
Payer: MEDICARE

## 2023-10-20 VITALS
WEIGHT: 198 LBS | HEIGHT: 70 IN | HEART RATE: 69 BPM | BODY MASS INDEX: 28.35 KG/M2 | OXYGEN SATURATION: 96 % | DIASTOLIC BLOOD PRESSURE: 78 MMHG | TEMPERATURE: 97.5 F | SYSTOLIC BLOOD PRESSURE: 132 MMHG | RESPIRATION RATE: 16 BRPM

## 2023-10-20 DIAGNOSIS — L03.011 CELLULITIS OF INDEX FINGER, RIGHT: ICD-10-CM

## 2023-10-20 PROCEDURE — 90471 IMMUNIZATION ADMIN: CPT | Performed by: NURSE PRACTITIONER

## 2023-10-20 PROCEDURE — 3075F SYST BP GE 130 - 139MM HG: CPT | Performed by: NURSE PRACTITIONER

## 2023-10-20 PROCEDURE — 90714 TD VACC NO PRESV 7 YRS+ IM: CPT | Performed by: NURSE PRACTITIONER

## 2023-10-20 PROCEDURE — 3078F DIAST BP <80 MM HG: CPT | Performed by: NURSE PRACTITIONER

## 2023-10-20 PROCEDURE — 10060 I&D ABSCESS SIMPLE/SINGLE: CPT | Performed by: NURSE PRACTITIONER

## 2023-10-20 RX ORDER — DOXYCYCLINE HYCLATE 100 MG
100 TABLET ORAL 2 TIMES DAILY
Qty: 14 TABLET | Refills: 0 | Status: SHIPPED | OUTPATIENT
Start: 2023-10-20 | End: 2023-10-27

## 2023-10-20 ASSESSMENT — ENCOUNTER SYMPTOMS
BRUISES/BLEEDS EASILY: 0
WEAKNESS: 0
FEVER: 0
SENSORY CHANGE: 0
CHILLS: 0
TINGLING: 0
MYALGIAS: 1

## 2023-10-20 ASSESSMENT — FIBROSIS 4 INDEX: FIB4 SCORE: 1.59

## 2023-10-20 NOTE — PROCEDURES
I and D    Date/Time: 10/20/2023 2:37 PM    Performed by: ALBERTA Miller  Authorized by: ALBERTA Miller  Type: abscess  Body area: upper extremity  Location details: right second finger  Anesthesia: local infiltration    Anesthesia:  Local Anesthetic: lidocaine 1% without epinephrine  Anesthetic total: 5 mL    Sedation:  Patient sedated: no    Incision depth: subcutaneous  Complexity: simple  Drainage: bloody  Drainage amount: scant  Wound treatment: wound left open  Patient tolerance: patient tolerated the procedure well with no immediate complications  Comments: No purulent discharge. Used large bore 16g needle to drain. Triple antibiotic ointment, telfa, coban.

## 2023-10-20 NOTE — PROGRESS NOTES
Subjective     Preston Spear is a 68 y.o. male who presents with Injury (X1week. R index finger. Pt was here last week. Possible infection.)            HPI   was seen in [facility] urgent [facility] 1 week ago for right index finger redness, swelling from blood blister he developed after cranking a eddie. Has finished Keflex x 5 days with no improvement.  has been using Epsom salt soaks and getting mild yellow discharge after shower.  States redness and swelling has continued up his finger states he is noticing a red line streaking along his finger.  Denies fever or malaise.  Left hand dominant.  Discomfort at finger due to increased swelling. Last tetanus > 5 years.    PMH:  has a past medical history of Cough, Daytime sleepiness, Diabetes (HCC), Amharic measles, Hearing difficulty, Hyperlipidemia, Hypertension, Influenza, Morning headache, Mumps, Snoring, and Wears glasses.  MEDS:   Current Outpatient Medications:     doxycycline (VIBRAMYCIN) 100 MG Tab, Take 1 Tablet by mouth 2 times a day for 7 days., Disp: 14 Tablet, Rfl: 0    Empagliflozin (JARDIANCE) 25 MG Tab, Take 1 Tablet by mouth every day., Disp: 100 Tablet, Rfl: 3    valsartan (DIOVAN) 320 MG tablet, Take 1 Tablet by mouth every day., Disp: 100 Tablet, Rfl: 3    sertraline (ZOLOFT) 50 MG Tab, TAKE 1 TABLET BY MOUTH EVERY DAY, Disp: 90 Tablet, Rfl: 3    amLODIPine (NORVASC) 2.5 MG Tab, Take 1 Tablet by mouth every day. Along with amlodipine 5 mg for today of 7.5 mg/day, Disp: 90 Tablet, Rfl: 3    metFORMIN (GLUCOPHAGE) 500 MG Tab, Take 1 Tablet by mouth 2 times a day with meals., Disp: 180 Tablet, Rfl: 3    amLODIPine (NORVASC) 5 MG Tab, TAKE 1 TABLET BY MOUTH EVERY DAY. FOR HIGH BLOOD PRESSURE, Disp: 90 Tablet, Rfl: 3    omeprazole (PRILOSEC) 20 MG delayed-release capsule, TAKE 1 CAPSULE BY MOUTH DAILY FOR GASTROESOPHAGEAL REFLUX DISEASE, Disp: 90 Capsule, Rfl: 3    metoprolol SR (TOPROL XL) 50 MG TABLET SR 24 HR, TAKE 1 TABLET BY MOUTH DAILY FOR HIGH  "BLOOD PRESSURE DISORDER, Disp: 90 Tablet, Rfl: 3    Omega-3 Fatty Acids (FISH OIL) 1000 MG Cap capsule, Take 1 Capsule by mouth 3 times a day with meals., Disp: , Rfl:     Zinc Sulfate (ZINC-220 PO), Take  by mouth., Disp: , Rfl:     Ascorbic Acid (VITAMIN C CR) 1000 MG Tab CR, Take  by mouth., Disp: , Rfl:     vitamin D3 (CHOLECALCIFEROL) 1000 Unit (25 mcg) Tab, Take 1 Tablet by mouth every day., Disp: , Rfl:     glucosamine Sulfate 500 MG Cap, Take 1 Capsule by mouth 3 times a day with meals., Disp: , Rfl:     ASPIRIN LOW DOSE PO, Take 5 mg by mouth., Disp: , Rfl:     Methylsulfonylmethane (MSM) 1000 MG Cap, Take 1 Capsule by mouth., Disp: , Rfl:     Multiple Vitamin (MULTI-VITAMIN DAILY PO), Take  by mouth., Disp: , Rfl:   ALLERGIES: No Known Allergies  SURGHX:   Past Surgical History:   Procedure Laterality Date    LAMINOTOMY      LUMBAR DECOMPRESSION      TONSILLECTOMY       SOCHX:  reports that he quit smoking about 12 months ago. His smoking use included cigarettes. He started smoking about 47 years ago. He has a 15.0 pack-year smoking history. He has never used smokeless tobacco. He reports that he does not currently use alcohol after a past usage of about 1.2 oz of alcohol per week. He reports that he does not use drugs.  FH: Family history was reviewed, no pertinent findings to report    Review of Systems   Constitutional:  Negative for chills, fever and malaise/fatigue.   Musculoskeletal:  Positive for myalgias. Negative for joint pain.   Skin:  Negative for itching and rash.        Increased redness and swelling at right index finger.   Neurological:  Negative for tingling, sensory change and weakness.   Endo/Heme/Allergies:  Does not bruise/bleed easily.   All other systems reviewed and are negative.             Objective     /78   Pulse 69   Temp 36.4 °C (97.5 °F) (Temporal)   Resp 16   Ht 1.778 m (5' 10\")   Wt 89.8 kg (198 lb)   SpO2 96%   BMI 28.41 kg/m²      Physical Exam  Vitals " reviewed.   Constitutional:       General: He is awake. He is not in acute distress.     Appearance: Normal appearance. He is well-developed. He is not ill-appearing, toxic-appearing or diaphoretic.   Cardiovascular:      Rate and Rhythm: Normal rate.   Pulmonary:      Effort: Pulmonary effort is normal. No respiratory distress.   Musculoskeletal:         General: Tenderness present. No deformity.      Right hand: Swelling and tenderness present. No deformity, lacerations or bony tenderness. Normal range of motion. Normal strength. Normal sensation. There is no disruption of two-point discrimination. Normal capillary refill. Normal pulse.   Skin:     General: Skin is warm and dry.      Findings: Erythema present. No abrasion, bruising, ecchymosis, signs of injury, laceration or wound.      Comments: Erythema and swelling at tip of right index finger to second IP joint.  Mild discomfort on palpation of site.  No red streaking up hand or arm.  No drainage from site.  Procedure: I&D.  See procedure note.   No purulent discharge with I&D.  No purulent discharge with procedure.   Neurological:      Mental Status: He is alert and oriented to person, place, and time.   Psychiatric:         Behavior: Behavior is cooperative.                             Assessment & Plan        1. Cellulitis of index finger, right    - TD Preservative Free =>6yo IM  - doxycycline (VIBRAMYCIN) 100 MG Tab; Take 1 Tablet by mouth 2 times a day for 7 days.  Dispense: 14 Tablet; Refill: 0     -May use appropriate over the counter NSAID/Tylenol as needed for discomfort  -Wash in neutral pH, non-perfumed soap and lukewarm water, pat dry  -Soak finger in Epsom salts as needed for decreasing swelling  -Elevate finger, cool compresses as needed   -Monitor for increase in redness of skin or spreading up hand, blistering, weeping of sites, increased warmth to skin, fever, malaise- need re-evaluation in emergency room, patient understands  this    -Education provided: see above    -Return to urgent care as needed if new or worsening symptoms  -Patient expresses understanding to treatment and plan of care  -Questions were encouraged and answered  -Recommended over the counter meds were written down when requested   -Advised to follow-up with the primary care provider for recheck, reevaluation, and consideration of further management as needed     -Time spent evaluating this patient was at least 30 minutes. This time comprises of the following: preparing for visit/chart review, patient history taken into account pertinent to symptoms, patient counseling/education for needed treatment outpatient, exam/evaluation/treatment plan provided, ordering any appropriate lab/test/procedures/meds, independent interpretation of any clinic testing, medication management with any other listed medications and chart documentation.

## 2023-10-23 DIAGNOSIS — F41.1 GAD (GENERALIZED ANXIETY DISORDER): ICD-10-CM

## 2023-10-23 DIAGNOSIS — I10 ESSENTIAL HYPERTENSION: ICD-10-CM

## 2023-10-23 DIAGNOSIS — K21.9 GASTROESOPHAGEAL REFLUX DISEASE WITHOUT ESOPHAGITIS: ICD-10-CM

## 2023-10-23 RX ORDER — OMEPRAZOLE 20 MG/1
20 CAPSULE, DELAYED RELEASE ORAL
Qty: 100 CAPSULE | Refills: 3 | Status: SHIPPED | OUTPATIENT
Start: 2023-10-23

## 2023-10-23 RX ORDER — AMLODIPINE BESYLATE 2.5 MG/1
2.5 TABLET ORAL DAILY
Qty: 100 TABLET | Refills: 3 | Status: SHIPPED | OUTPATIENT
Start: 2023-10-23

## 2023-10-23 RX ORDER — METOPROLOL SUCCINATE 50 MG/1
50 TABLET, EXTENDED RELEASE ORAL DAILY
Qty: 100 TABLET | Refills: 3 | Status: SHIPPED | OUTPATIENT
Start: 2023-10-23

## 2023-10-23 RX ORDER — AMLODIPINE BESYLATE 5 MG/1
5 TABLET ORAL DAILY
Qty: 100 TABLET | Refills: 3 | Status: SHIPPED | OUTPATIENT
Start: 2023-10-23

## 2023-10-24 ENCOUNTER — APPOINTMENT (OUTPATIENT)
Dept: MEDICAL GROUP | Facility: MEDICAL CENTER | Age: 68
End: 2023-10-24
Payer: MEDICARE

## 2023-10-24 PROCEDURE — RXMED WILLOW AMBULATORY MEDICATION CHARGE: Performed by: FAMILY MEDICINE

## 2023-10-26 ENCOUNTER — PHARMACY VISIT (OUTPATIENT)
Dept: PHARMACY | Facility: MEDICAL CENTER | Age: 68
End: 2023-10-26
Payer: COMMERCIAL

## 2023-10-27 DIAGNOSIS — E11.29 TYPE 2 DIABETES MELLITUS WITH MICROALBUMINURIA, WITHOUT LONG-TERM CURRENT USE OF INSULIN (HCC): ICD-10-CM

## 2023-10-27 DIAGNOSIS — R80.9 TYPE 2 DIABETES MELLITUS WITH MICROALBUMINURIA, WITHOUT LONG-TERM CURRENT USE OF INSULIN (HCC): ICD-10-CM

## 2024-01-02 ENCOUNTER — PHARMACY VISIT (OUTPATIENT)
Dept: PHARMACY | Facility: MEDICAL CENTER | Age: 69
End: 2024-01-02
Payer: COMMERCIAL

## 2024-01-02 PROCEDURE — RXMED WILLOW AMBULATORY MEDICATION CHARGE: Performed by: FAMILY MEDICINE

## 2024-01-03 PROCEDURE — RXMED WILLOW AMBULATORY MEDICATION CHARGE: Performed by: FAMILY MEDICINE

## 2024-01-04 ENCOUNTER — HOSPITAL ENCOUNTER (OUTPATIENT)
Dept: LAB | Facility: MEDICAL CENTER | Age: 69
End: 2024-01-04
Attending: FAMILY MEDICINE
Payer: MEDICARE

## 2024-01-04 ENCOUNTER — PHARMACY VISIT (OUTPATIENT)
Dept: PHARMACY | Facility: MEDICAL CENTER | Age: 69
End: 2024-01-04
Payer: COMMERCIAL

## 2024-01-04 DIAGNOSIS — K76.0 HEPATIC STEATOSIS: ICD-10-CM

## 2024-01-04 DIAGNOSIS — R80.9 TYPE 2 DIABETES MELLITUS WITH MICROALBUMINURIA, WITHOUT LONG-TERM CURRENT USE OF INSULIN (HCC): ICD-10-CM

## 2024-01-04 DIAGNOSIS — E11.29 TYPE 2 DIABETES MELLITUS WITH MICROALBUMINURIA, WITHOUT LONG-TERM CURRENT USE OF INSULIN (HCC): ICD-10-CM

## 2024-01-04 LAB
ALBUMIN SERPL BCP-MCNC: 4.2 G/DL (ref 3.2–4.9)
ALBUMIN/GLOB SERPL: 1.4 G/DL
ALP SERPL-CCNC: 59 U/L (ref 30–99)
ALT SERPL-CCNC: 90 U/L (ref 2–50)
ANION GAP SERPL CALC-SCNC: 13 MMOL/L (ref 7–16)
AST SERPL-CCNC: 48 U/L (ref 12–45)
BILIRUB SERPL-MCNC: 0.3 MG/DL (ref 0.1–1.5)
BUN SERPL-MCNC: 20 MG/DL (ref 8–22)
CALCIUM ALBUM COR SERPL-MCNC: 8.9 MG/DL (ref 8.5–10.5)
CALCIUM SERPL-MCNC: 9.1 MG/DL (ref 8.5–10.5)
CHLORIDE SERPL-SCNC: 106 MMOL/L (ref 96–112)
CO2 SERPL-SCNC: 23 MMOL/L (ref 20–33)
CREAT SERPL-MCNC: 0.89 MG/DL (ref 0.5–1.4)
EST. AVERAGE GLUCOSE BLD GHB EST-MCNC: 148 MG/DL
GFR SERPLBLD CREATININE-BSD FMLA CKD-EPI: 92 ML/MIN/1.73 M 2
GLOBULIN SER CALC-MCNC: 3 G/DL (ref 1.9–3.5)
GLUCOSE SERPL-MCNC: 150 MG/DL (ref 65–99)
HBA1C MFR BLD: 6.8 % (ref 4–5.6)
POTASSIUM SERPL-SCNC: 4.5 MMOL/L (ref 3.6–5.5)
PROT SERPL-MCNC: 7.2 G/DL (ref 6–8.2)
SODIUM SERPL-SCNC: 142 MMOL/L (ref 135–145)
VIT B12 SERPL-MCNC: 1023 PG/ML (ref 211–911)

## 2024-01-04 PROCEDURE — 80053 COMPREHEN METABOLIC PANEL: CPT

## 2024-01-04 PROCEDURE — 83036 HEMOGLOBIN GLYCOSYLATED A1C: CPT

## 2024-01-04 PROCEDURE — 82607 VITAMIN B-12: CPT

## 2024-01-04 PROCEDURE — 36415 COLL VENOUS BLD VENIPUNCTURE: CPT

## 2024-01-08 ENCOUNTER — PHARMACY VISIT (OUTPATIENT)
Dept: PHARMACY | Facility: MEDICAL CENTER | Age: 69
End: 2024-01-08
Payer: COMMERCIAL

## 2024-01-08 PROCEDURE — RXMED WILLOW AMBULATORY MEDICATION CHARGE: Performed by: FAMILY MEDICINE

## 2024-01-17 ENCOUNTER — OFFICE VISIT (OUTPATIENT)
Dept: MEDICAL GROUP | Facility: MEDICAL CENTER | Age: 69
End: 2024-01-17
Payer: MEDICARE

## 2024-01-17 VITALS
DIASTOLIC BLOOD PRESSURE: 62 MMHG | OXYGEN SATURATION: 97 % | TEMPERATURE: 97.4 F | RESPIRATION RATE: 16 BRPM | HEART RATE: 75 BPM | BODY MASS INDEX: 30.06 KG/M2 | WEIGHT: 210 LBS | SYSTOLIC BLOOD PRESSURE: 122 MMHG | HEIGHT: 70 IN

## 2024-01-17 DIAGNOSIS — K76.0 HEPATIC STEATOSIS: ICD-10-CM

## 2024-01-17 DIAGNOSIS — R80.9 TYPE 2 DIABETES MELLITUS WITH MICROALBUMINURIA, WITHOUT LONG-TERM CURRENT USE OF INSULIN (HCC): ICD-10-CM

## 2024-01-17 DIAGNOSIS — Z23 NEED FOR VACCINATION: ICD-10-CM

## 2024-01-17 DIAGNOSIS — R09.82 POSTNASAL DRIP: ICD-10-CM

## 2024-01-17 DIAGNOSIS — E66.9 OBESITY (BMI 30.0-34.9): ICD-10-CM

## 2024-01-17 DIAGNOSIS — I77.810 ASCENDING AORTA DILATION (HCC): ICD-10-CM

## 2024-01-17 DIAGNOSIS — E11.29 TYPE 2 DIABETES MELLITUS WITH MICROALBUMINURIA, WITHOUT LONG-TERM CURRENT USE OF INSULIN (HCC): ICD-10-CM

## 2024-01-17 PROBLEM — E66.811 OBESITY (BMI 30.0-34.9): Status: ACTIVE | Noted: 2024-01-17

## 2024-01-17 PROCEDURE — 90662 IIV NO PRSV INCREASED AG IM: CPT | Performed by: FAMILY MEDICINE

## 2024-01-17 PROCEDURE — 3078F DIAST BP <80 MM HG: CPT | Performed by: FAMILY MEDICINE

## 2024-01-17 PROCEDURE — G0008 ADMIN INFLUENZA VIRUS VAC: HCPCS | Performed by: FAMILY MEDICINE

## 2024-01-17 PROCEDURE — 99214 OFFICE O/P EST MOD 30 MIN: CPT | Mod: 25 | Performed by: FAMILY MEDICINE

## 2024-01-17 PROCEDURE — 3074F SYST BP LT 130 MM HG: CPT | Performed by: FAMILY MEDICINE

## 2024-01-17 RX ORDER — AZELASTINE 1 MG/ML
1 SPRAY, METERED NASAL 2 TIMES DAILY
Qty: 90 ML | Refills: 1 | Status: SHIPPED | OUTPATIENT
Start: 2024-01-17

## 2024-01-17 ASSESSMENT — FIBROSIS 4 INDEX: FIB4 SCORE: 1.51

## 2024-01-17 ASSESSMENT — ENCOUNTER SYMPTOMS: COUGH: 1

## 2024-01-17 ASSESSMENT — PATIENT HEALTH QUESTIONNAIRE - PHQ9: CLINICAL INTERPRETATION OF PHQ2 SCORE: 0

## 2024-01-17 NOTE — LETTER
[facility]  Ryan Carranza M.D.  [address]  [place] [address]  Fax: [phone]   Authorization for Release/Disclosure of   Protected Health Information   Name: SELVIN SPEAR : 1955 SSN: [id number]   Address: [address] [place] [address] [address] [place] [address] Phone:    [phone] (home)    I authorize the entity listed below to release/disclose the PHI below to:   [facility]/Ryan Carranza M.D. and Ryan Carranza M.D.   Provider or Entity Name:     Address   City, State, Zip   Phone:      Fax:     Reason for request: continuity of care   Information to be released:    [  ] LAST COLONOSCOPY,  including any PATH REPORT and follow-up  [  ] LAST FIT/COLOGUARD RESULT [  ] LAST DEXA  [  ] LAST MAMMOGRAM  [  ] LAST PAP  [  ] LAST LABS [  ] RETINA EXAM REPORT  [  ] IMMUNIZATION RECORDS  [  ] Release all info      [  ] Check here and initial the line next to each item to release ALL health information INCLUDING  _____ Care and treatment for drug and / or alcohol abuse  _____ HIV testing, infection status, or AIDS  _____ Genetic Testing    DATES OF SERVICE OR TIME PERIOD TO BE DISCLOSED: _____________  I understand and acknowledge that:  * This Authorization may be revoked at any time by you in writing, except if your health information has already been used or disclosed.  * Your health information that will be used or disclosed as a result of you signing this authorization could be re-disclosed by the recipient. If this occurs, your re-disclosed health information may no longer be protected by State or Federal laws.  * You may refuse to sign this Authorization. Your refusal will not affect your ability to obtain treatment.  * This Authorization becomes effective upon signing and will  on (date) __________.      If no date is indicated, this Authorization will  one (1) year from the signature date.    Name: Selvin Spear  Signature: Date:   2024     PLEASE FAX  REQUESTED RECORDS BACK TO: [phone]

## 2024-01-17 NOTE — ASSESSMENT & PLAN NOTE
Chronic problem, stable, continue metformin and Jardiance at same dose.  On ARB inhibitor for microalbuminuria, check urine test with next blood test

## 2024-01-17 NOTE — ASSESSMENT & PLAN NOTE
Chronic problem, unstable, although improving, continue to work on diet and exercise for weight loss.

## 2024-01-17 NOTE — PROGRESS NOTES
FAMILY MEDICINE VISIT                                                               Chief complaint::Diagnoses of Type 2 diabetes mellitus with microalbuminuria, without long-term current use of insulin (HCC), Hepatic steatosis, Obesity (BMI 30.0-34.9), Postnasal drip, Need for vaccination, and Ascending aorta dilation (HCC) were pertinent to this visit.    History of present illness: Raf Spear is a 68 y.o. male who presented for lab follow up.    Problem   Obesity (Bmi 30.0-34.9)    Current BMI of 30.13, gained weight since I last saw him.  He reports that he has not been much physically active due to winter and ate candies during holidays     Postnasal Drip    He has nasal congestion and postnasal drip.  He has been clearing his throat.  He is currently not using any medication for this.  No other major symptoms.     Ascending Aorta Dilation (Hcc)    Last CT 10/2022 showed Stable ascending aortic ectasia measuring 3.9 cm.      Hepatic Steatosis    Recent CT showed hepatic steatosis and recent liver function test showed mild improvement when compared to previous numbers his last lipid panel was normal.  He is currently not on any statins.  Cholesterols are normal without statins.  HDL is mildly low at 29.    Lab Results   Component Value Date/Time    CHOLSTRLTOT 116 08/23/2023 0911    TRIGLYCERIDE 143 08/23/2023 0911    HDL 29 (A) 08/23/2023 0911    LDL 58 08/23/2023 0911      Type 2 Diabetes Mellitus With Microalbuminuria, Without Long-Term Current Use of Insulin (Hcc)      A1c at 6.8.  He is on metformin 500 mg 2 times daily which she has been taking recently.  He is on Jardiance 25 mg daily.      Lab Results   Component Value Date/Time    HBA1C 6.8 (H) 01/04/2024 09:32 AM   Had eye exam done, request records     Bmi 29.0-29.9,Adult (Resolved)          Review of systems:     Review of Systems   HENT:  Positive for congestion.         Postnasal drip   Respiratory:  Positive for cough.         Medications  "and Allergies:     Current Outpatient Medications   Medication Sig Dispense Refill    azelastine (ASTELIN) 137 MCG/SPRAY nasal spray Administer 1 Spray into affected nostril(S) 2 times a day. 90 mL 1    metFORMIN (GLUCOPHAGE) 500 MG Tab TAKE 2 TABLETS BY MOUTH 2 TIMES A DAY WITH MEALS. 400 Tablet 3    amLODIPine (NORVASC) 5 MG Tab Take 1 Tablet by mouth every day. For high blood pressure 100 Tablet 3    omeprazole (PRILOSEC) 20 MG delayed-release capsule Take 1 Capsule by mouth every morning on an empty stomach. For reflux symptoms 100 Capsule 3    metoprolol SR (TOPROL XL) 50 MG TABLET SR 24 HR Take 1 Tablet by mouth every day. For heart and blood pressure 100 Tablet 3    amLODIPine (NORVASC) 2.5 MG Tab Take 1 Tablet by mouth every day. Along with amlodipine 5 mg for today of 7.5 mg/day for blood pressure 100 Tablet 3    sertraline (ZOLOFT) 50 MG Tab Take 1 Tablet by mouth every day. For anxiety 100 Tablet 3    Empagliflozin (JARDIANCE) 25 MG Tab Take 1 Tablet by mouth every day. 100 Tablet 3    valsartan (DIOVAN) 320 MG tablet Take 1 Tablet by mouth every day. 100 Tablet 3    Omega-3 Fatty Acids (FISH OIL) 1000 MG Cap capsule Take 1 Capsule by mouth 3 times a day with meals.      Zinc Sulfate (ZINC-220 PO) Take  by mouth.      Ascorbic Acid (VITAMIN C CR) 1000 MG Tab CR Take  by mouth.      vitamin D3 (CHOLECALCIFEROL) 1000 Unit (25 mcg) Tab Take 1 Tablet by mouth every day.      glucosamine Sulfate 500 MG Cap Take 1 Capsule by mouth 3 times a day with meals.      ASPIRIN LOW DOSE PO Take 5 mg by mouth.      Methylsulfonylmethane (MSM) 1000 MG Cap Take 1 Capsule by mouth.      Multiple Vitamin (MULTI-VITAMIN DAILY PO) Take  by mouth.       No current facility-administered medications for this visit.          Vitals:    /62   Pulse 75   Temp 36.3 °C (97.4 °F)   Resp 16   Ht 1.778 m (5' 10\")   Wt 95.3 kg (210 lb)   SpO2 97%  Body mass index is 30.13 kg/m².    Physical Exam:     Physical " Exam  Constitutional:       Appearance: Normal appearance. He is well-developed and well-groomed.   HENT:      Head: Normocephalic and atraumatic.      Right Ear: External ear normal.      Left Ear: External ear normal.   Eyes:      General:         Right eye: No discharge.         Left eye: No discharge.      Conjunctiva/sclera: Conjunctivae normal.   Cardiovascular:      Rate and Rhythm: Normal rate.   Pulmonary:      Effort: Pulmonary effort is normal. No respiratory distress.   Musculoskeletal:      Cervical back: Neck supple.   Skin:     Findings: No rash.   Neurological:      Mental Status: He is alert.   Psychiatric:         Mood and Affect: Mood and affect normal.         Behavior: Behavior normal.          Labs:  I reviewed with patient recent labs resulted on 1/4/2024.    Assessment/Plan:    HCC Gap Form    Diagnosis to address: E11.29, R80.9 - Type 2 diabetes mellitus with microalbuminuria, without long-term current use of insulin (HCC)  Assessment and plan: Chronic, stable. Continue with current defined treatment plan: Recent A1c at 6.8, continue metformin and Jardiance. Follow-up at least annually.  Last edited 01/17/24 10:56 PST by Ryan Carranza M.D.          Problem List Items Addressed This Visit       Type 2 diabetes mellitus with microalbuminuria, without long-term current use of insulin (HCC)     Chronic problem, stable, continue metformin and Jardiance at same dose.  On ARB inhibitor for microalbuminuria, check urine test with next blood test         Relevant Orders    Comp Metabolic Panel    HEMOGLOBIN A1C    MICROALBUMIN CREAT RATIO URINE    VITAMIN B12    Hepatic steatosis     Chronic problem, unstable, although improving, continue to work on diet and exercise for weight loss.         Obesity (BMI 30.0-34.9)     Chronic problem, unstable, counseled regarding starting physical activity.         Relevant Orders    Patient identified as having weight management issue.  Appropriate orders and  counseling given.    Postnasal drip     New problem, unstable, start azelastine nasal spray.         Relevant Medications    azelastine (ASTELIN) 137 MCG/SPRAY nasal spray    Ascending aorta dilation (HCC)     Chronic problem, stable, recheck CTA.         Relevant Orders    CT-CTA CHEST WITH & W/O-POST PROCESS     Other Visit Diagnoses       Need for vaccination        Relevant Orders    INFLUENZA VACCINE, HIGH DOSE (65+ ONLY) (Completed)             Please note that this dictation was created using voice recognition software. I have made every reasonable attempt to correct obvious errors, but I expect that there are errors of grammar and possibly content that I did not discover before finalizing the note.    Follow up in 4 months for lab follow-up.

## 2024-01-29 PROCEDURE — RXMED WILLOW AMBULATORY MEDICATION CHARGE: Performed by: FAMILY MEDICINE

## 2024-01-30 ENCOUNTER — PHARMACY VISIT (OUTPATIENT)
Dept: PHARMACY | Facility: MEDICAL CENTER | Age: 69
End: 2024-01-30
Payer: COMMERCIAL

## 2024-02-07 ENCOUNTER — HOSPITAL ENCOUNTER (OUTPATIENT)
Dept: RADIOLOGY | Facility: MEDICAL CENTER | Age: 69
End: 2024-02-07
Attending: PHYSICIAN ASSISTANT
Payer: MEDICARE

## 2024-02-07 ENCOUNTER — OFFICE VISIT (OUTPATIENT)
Dept: URGENT CARE | Facility: PHYSICIAN GROUP | Age: 69
End: 2024-02-07
Payer: MEDICARE

## 2024-02-07 VITALS
WEIGHT: 210 LBS | RESPIRATION RATE: 18 BRPM | DIASTOLIC BLOOD PRESSURE: 72 MMHG | TEMPERATURE: 98 F | HEART RATE: 86 BPM | SYSTOLIC BLOOD PRESSURE: 118 MMHG | HEIGHT: 70 IN | OXYGEN SATURATION: 94 % | BODY MASS INDEX: 30.06 KG/M2

## 2024-02-07 DIAGNOSIS — R13.10 DYSPHAGIA, UNSPECIFIED TYPE: ICD-10-CM

## 2024-02-07 DIAGNOSIS — R06.00 DYSPNEA, UNSPECIFIED TYPE: ICD-10-CM

## 2024-02-07 PROCEDURE — 3074F SYST BP LT 130 MM HG: CPT | Performed by: PHYSICIAN ASSISTANT

## 2024-02-07 PROCEDURE — 99214 OFFICE O/P EST MOD 30 MIN: CPT | Performed by: PHYSICIAN ASSISTANT

## 2024-02-07 PROCEDURE — 71046 X-RAY EXAM CHEST 2 VIEWS: CPT

## 2024-02-07 PROCEDURE — 3078F DIAST BP <80 MM HG: CPT | Performed by: PHYSICIAN ASSISTANT

## 2024-02-07 PROCEDURE — 70360 X-RAY EXAM OF NECK: CPT

## 2024-02-07 RX ORDER — DEXAMETHASONE SODIUM PHOSPHATE 10 MG/ML
10 INJECTION INTRAMUSCULAR; INTRAVENOUS ONCE
Status: COMPLETED | OUTPATIENT
Start: 2024-02-07 | End: 2024-02-07

## 2024-02-07 RX ADMIN — DEXAMETHASONE SODIUM PHOSPHATE 10 MG: 10 INJECTION INTRAMUSCULAR; INTRAVENOUS at 14:26

## 2024-02-07 ASSESSMENT — FIBROSIS 4 INDEX: FIB4 SCORE: 1.51

## 2024-02-07 NOTE — PROGRESS NOTES
"Subjective:   Raf Spear is a 68 y.o. male who presents for Sinus Problem (Congestion ,throat congestion ,hard to swallow ,sob  problem with CPAP compliance x 3 days )      Is a 68-year-old male who is noted a 3 to 4-day history of congestion, problems with swallowing as well as some new shortness of breath.  He feels like his swelling is abnormal and it is harder for food to pass.  He is able to tolerate his medications and drink liquids but has not had solid food for the last day.  He had no episode of choking or acute onset.  He has noted some congestion and postnasal drip.  Frequently when swallowing he will have a coughing episode he denies any sore throat at rest, only notes some discomfort and it feels like food is hard to pass when he swallows    Review of Systems   HENT:  Positive for congestion.        Medications, Allergies, and current problem list reviewed today in Epic.     Objective:     /72   Pulse 86   Temp 36.7 °C (98 °F) (Temporal)   Resp 18   Ht 1.778 m (5' 10\")   Wt 95.3 kg (210 lb)   SpO2 94%     Physical Exam  Vitals reviewed.   Constitutional:       Appearance: Normal appearance.   HENT:      Head: Normocephalic and atraumatic.      Right Ear: External ear normal.      Left Ear: External ear normal.      Nose: Nose normal.      Mouth/Throat:      Mouth: Mucous membranes are moist.      Comments: Normal posterior oropharynx, mild postnasal drip and cobblestoning of the mucosa.  No hoarse or muffled phonation, tolerating secretions  Eyes:      Conjunctiva/sclera: Conjunctivae normal.   Cardiovascular:      Rate and Rhythm: Normal rate and regular rhythm.   Pulmonary:      Effort: Pulmonary effort is normal.      Breath sounds: Normal breath sounds.   Musculoskeletal:      Cervical back: Normal range of motion. No tenderness.   Lymphadenopathy:      Cervical: No cervical adenopathy.   Skin:     General: Skin is warm and dry.      Capillary Refill: Capillary refill takes less " than 2 seconds.   Neurological:      Mental Status: He is alert and oriented to person, place, and time.         RADIOLOGY RESULTS   DX-NECK FOR SOFT TISSUE    Result Date: 2/7/2024  2/7/2024 1:06 PM HISTORY/REASON FOR EXAM:  Pain Following Trauma. Shortness of breath, difficulty swallowing pills. TECHNIQUE/EXAM DESCRIPTION AND NUMBER OF VIEWS:  2 views of the soft tissue neck. COMPARISON:  None FINDINGS: No radiographic foreign body identified. No airway narrowing. Normal epiglottis. No prevertebral soft tissue thickening. Mild anterolisthesis of C3-4. Moderate degenerative change of the cervical spine.     No soft tissue thickening or airway narrowing.    DX-CHEST-2 VIEWS    Result Date: 2/7/2024  2/7/2024 1:06 PM HISTORY/REASON FOR EXAM:  Cough TECHNIQUE/EXAM DESCRIPTION AND NUMBER OF VIEWS: Two views of the chest. COMPARISON:  None. FINDINGS: No pulmonary infiltrates or consolidations are noted. No pleural effusions, no pneumothorax are appreciated. Normal cardiopericardial silhouette.     1. No active cardiopulmonary abnormalities are identified.           Assessment/Plan:     Diagnosis and associated orders:     1. Dysphagia, unspecified type  DX-NECK FOR SOFT TISSUE    DX-CHEST-2 VIEWS    dexamethasone (Decadron) injection (check route below) 10 mg    Referral to Gastroenterology      2. Dyspnea, unspecified type  DX-NECK FOR SOFT TISSUE    DX-CHEST-2 VIEWS    dexamethasone (Decadron) injection (check route below) 10 mg         Comments/MDM:     Reviewed radiographs, went over results with patient.  He was able to tolerate the Decadron in clinic without any significant difficulty.  He does seem to be having some dysphagia to solid foods.  Recommend liquid diet, transition to thickened liquids such as yogurt or applesauce and slow introduction of food.  Difficult to tell from his history if he is having increased mucus production.  Seems less like esophagitis, fairway obstruction, epiglottitis etc.  Discussed  strict ER precautions if he is having more difficulty swallowing or breathing.  Stat referral placed to gastroenterology for consideration of upper endoscopy.  Patient is stable for outpatient workup if he is able to tolerate liquids as well as his regular medications.  He had normal lung sounds, no labored breathing and admits that his shortness of breath mostly feels like air passage through the upper airways seems constricted are limited but no wheezing on exam or stridor         Differential diagnosis, natural history, supportive care, and indications for immediate follow-up discussed.    Advised the patient to follow-up with the primary care physician for recheck, reevaluation, and consideration of further management.    Please note that this dictation was created using voice recognition software. I have made a reasonable attempt to correct obvious errors, but I expect that there are errors of grammar and possibly content that I did not discover before finalizing the note.    This note was electronically signed by Dave Quiñonez PA-C

## 2024-02-10 ENCOUNTER — APPOINTMENT (OUTPATIENT)
Dept: RADIOLOGY | Facility: MEDICAL CENTER | Age: 69
End: 2024-02-10
Attending: EMERGENCY MEDICINE
Payer: MEDICARE

## 2024-02-10 ENCOUNTER — HOSPITAL ENCOUNTER (EMERGENCY)
Facility: MEDICAL CENTER | Age: 69
End: 2024-02-10
Attending: EMERGENCY MEDICINE
Payer: MEDICARE

## 2024-02-10 VITALS
HEART RATE: 78 BPM | OXYGEN SATURATION: 92 % | TEMPERATURE: 97.8 F | HEIGHT: 70 IN | RESPIRATION RATE: 18 BRPM | SYSTOLIC BLOOD PRESSURE: 151 MMHG | DIASTOLIC BLOOD PRESSURE: 86 MMHG | BODY MASS INDEX: 29.26 KG/M2 | WEIGHT: 204.37 LBS

## 2024-02-10 DIAGNOSIS — M48.02 DEGENERATIVE CERVICAL SPINAL STENOSIS: ICD-10-CM

## 2024-02-10 DIAGNOSIS — K11.8 NODULE OF PAROTID GLAND: ICD-10-CM

## 2024-02-10 DIAGNOSIS — R91.1 LUNG NODULE: ICD-10-CM

## 2024-02-10 DIAGNOSIS — R13.10 DYSPHAGIA, UNSPECIFIED TYPE: ICD-10-CM

## 2024-02-10 LAB
ALBUMIN SERPL BCP-MCNC: 4.5 G/DL (ref 3.2–4.9)
ALBUMIN/GLOB SERPL: 1.5 G/DL
ALP SERPL-CCNC: 76 U/L (ref 30–99)
ALT SERPL-CCNC: 101 U/L (ref 2–50)
ANION GAP SERPL CALC-SCNC: 13 MMOL/L (ref 7–16)
AST SERPL-CCNC: 53 U/L (ref 12–45)
BASOPHILS # BLD AUTO: 0.9 % (ref 0–1.8)
BASOPHILS # BLD: 0.09 K/UL (ref 0–0.12)
BILIRUB SERPL-MCNC: 0.6 MG/DL (ref 0.1–1.5)
BUN SERPL-MCNC: 25 MG/DL (ref 8–22)
CALCIUM ALBUM COR SERPL-MCNC: 8.6 MG/DL (ref 8.5–10.5)
CALCIUM SERPL-MCNC: 9 MG/DL (ref 8.5–10.5)
CHLORIDE SERPL-SCNC: 105 MMOL/L (ref 96–112)
CO2 SERPL-SCNC: 20 MMOL/L (ref 20–33)
CREAT SERPL-MCNC: 0.98 MG/DL (ref 0.5–1.4)
EOSINOPHIL # BLD AUTO: 0.16 K/UL (ref 0–0.51)
EOSINOPHIL NFR BLD: 1.6 % (ref 0–6.9)
ERYTHROCYTE [DISTWIDTH] IN BLOOD BY AUTOMATED COUNT: 43.6 FL (ref 35.9–50)
FLUAV RNA SPEC QL NAA+PROBE: NEGATIVE
FLUBV RNA SPEC QL NAA+PROBE: NEGATIVE
GFR SERPLBLD CREATININE-BSD FMLA CKD-EPI: 83 ML/MIN/1.73 M 2
GLOBULIN SER CALC-MCNC: 3 G/DL (ref 1.9–3.5)
GLUCOSE SERPL-MCNC: 119 MG/DL (ref 65–99)
HCT VFR BLD AUTO: 46.5 % (ref 42–52)
HGB BLD-MCNC: 14.6 G/DL (ref 14–18)
IMM GRANULOCYTES # BLD AUTO: 0.04 K/UL (ref 0–0.11)
IMM GRANULOCYTES NFR BLD AUTO: 0.4 % (ref 0–0.9)
LYMPHOCYTES # BLD AUTO: 2.12 K/UL (ref 1–4.8)
LYMPHOCYTES NFR BLD: 21.5 % (ref 22–41)
MCH RBC QN AUTO: 22.5 PG (ref 27–33)
MCHC RBC AUTO-ENTMCNC: 31.4 G/DL (ref 32.3–36.5)
MCV RBC AUTO: 71.6 FL (ref 81.4–97.8)
MONOCYTES # BLD AUTO: 0.89 K/UL (ref 0–0.85)
MONOCYTES NFR BLD AUTO: 9 % (ref 0–13.4)
NEUTROPHILS # BLD AUTO: 6.55 K/UL (ref 1.82–7.42)
NEUTROPHILS NFR BLD: 66.6 % (ref 44–72)
NRBC # BLD AUTO: 0 K/UL
NRBC BLD-RTO: 0 /100 WBC (ref 0–0.2)
PLATELET # BLD AUTO: 292 K/UL (ref 164–446)
PMV BLD AUTO: 9 FL (ref 9–12.9)
POTASSIUM SERPL-SCNC: 4.3 MMOL/L (ref 3.6–5.5)
PROT SERPL-MCNC: 7.5 G/DL (ref 6–8.2)
RBC # BLD AUTO: 6.49 M/UL (ref 4.7–6.1)
RSV RNA SPEC QL NAA+PROBE: NEGATIVE
S PYO DNA SPEC NAA+PROBE: NOT DETECTED
SARS-COV-2 RNA RESP QL NAA+PROBE: NOTDETECTED
SODIUM SERPL-SCNC: 138 MMOL/L (ref 135–145)
WBC # BLD AUTO: 9.9 K/UL (ref 4.8–10.8)

## 2024-02-10 PROCEDURE — 700117 HCHG RX CONTRAST REV CODE 255: Performed by: EMERGENCY MEDICINE

## 2024-02-10 PROCEDURE — 99283 EMERGENCY DEPT VISIT LOW MDM: CPT

## 2024-02-10 PROCEDURE — 80053 COMPREHEN METABOLIC PANEL: CPT

## 2024-02-10 PROCEDURE — 0241U HCHG SARS-COV-2 COVID-19 NFCT DS RESP RNA 4 TRGT ED POC: CPT

## 2024-02-10 PROCEDURE — 85025 COMPLETE CBC W/AUTO DIFF WBC: CPT

## 2024-02-10 PROCEDURE — 87651 STREP A DNA AMP PROBE: CPT

## 2024-02-10 PROCEDURE — 36415 COLL VENOUS BLD VENIPUNCTURE: CPT

## 2024-02-10 PROCEDURE — 70491 CT SOFT TISSUE NECK W/DYE: CPT

## 2024-02-10 RX ADMIN — IOHEXOL 80 ML: 350 INJECTION, SOLUTION INTRAVENOUS at 14:45

## 2024-02-10 ASSESSMENT — FIBROSIS 4 INDEX: FIB4 SCORE: 1.51

## 2024-02-10 NOTE — DISCHARGE INSTRUCTIONS
"If your swallowing has not improved in the next week, please follow-up with ear nose throat specialist.    Incidental finding of new 4 mm left lung nodule, consider repeat CT scan of the chest in 12 months to reassess    Incidental finding of right parotid gland nodule, follow-up with \"focused ultrasound\", discuss this with your primary doctor    Incidental finding C5-6 osteophyte complex with suggestion of degenerative central canal stenosis.  Recommended follow-up MRI cervical spine, referral has been placed to spine specialist  "

## 2024-02-10 NOTE — ED TRIAGE NOTES
"Raf Devon Spear  68 y.o.  male  Chief Complaint   Patient presents with    Difficulty Swallowing     Pt was having cold symptoms 5 days ago, took liquid nyquil & felt it \"went down the wrong pipe\", has been having difficulty swallowing ever since. Pt feels he has a \"lump\" in his throat that is making it difficult to swallow. C/o some shortness of breath.     Pt had CXR & neck Xray at  a few days ago, told to return to ED if symptoms worsened. Pt denies fevers. No visible pus on tonsils.   "

## 2024-02-10 NOTE — ED PROVIDER NOTES
"ED Provider Note    CHIEF COMPLAINT  Chief Complaint   Patient presents with    Difficulty Swallowing     Pt was having cold symptoms 5 days ago, took liquid nyquil & felt it \"went down the wrong pipe\", has been having difficulty swallowing ever since. Pt feels he has a \"lump\" in his throat that is making it difficult to swallow. C/o some shortness of breath.       EXTERNAL RECORDS REVIEWED  Outpatient Notes primary office visit from February 2024 for sinus problems, congestion and difficulty swallowing    HPI/ROS  LIMITATION TO HISTORY   Select: : None  OUTSIDE HISTORIAN(S):  Significant other patient's wife at bedside for discussion history and symptoms    Raf Spear is a 68 y.o. male who presents with difficulty swallowing, food and food getting caught in his upper throat.  Patient states 4 to 5 days ago was drinking NyQuil when they \"went down the wrong tube\".  He had a strong gag and choking episode lasting approximate 30 minutes.  Since that time while swallowing causes discomfort posterior throat, \"the  pills will not go down without an extra swallow\" requiring extra care drinking fluids.  He states apneic episodes on his CPAP machine at night have increased.  Patient had taken NyQuil 4 to 5 days ago for postnasal drip, has also been on nasal medication.    PAST MEDICAL HISTORY   has a past medical history of Cough, Daytime sleepiness, Diabetes (HCC), [place] measles, Hearing difficulty, Hyperlipidemia, Hypertension, Influenza, Morning headache, Mumps, Snoring, and Wears glasses.    SURGICAL HISTORY   has a past surgical history that includes lumbar decompression; laminotomy; and tonsillectomy.    FAMILY HISTORY  Family History   Problem Relation Age of Onset    Cancer Mother         Lymphoma Leukemia    Hypertension Father     Cancer Father         Colon cancer     Colon Cancer Father     Cancer Sister         Diabetic, COPD       SOCIAL HISTORY  Social History     Tobacco Use    Smoking status: Former " "    Current packs/day: 0.00     Average packs/day: 0.3 packs/day for 50.0 years (15.0 ttl pk-yrs)     Types: Cigarettes     Start date: 1976     Quit date: 10/1/2022     Years since quittin.3    Smokeless tobacco: Never    Tobacco comments:     Have quit and restarted several times.  Most recently about a month ago   Vaping Use    Vaping Use: Never used   Substance and Sexual Activity    Alcohol use: Not Currently     Alcohol/week: 1.2 oz     Types: 2 Cans of beer per week     Comment: Social    Drug use: Never    Sexual activity: Not on file     Comment: Retired for 3 days, part time working       CURRENT MEDICATIONS  Home Medications       Reviewed by Felipa Brown R.N. (Registered Nurse) on 02/10/24 at 1156  Med List Status: Not Addressed     Medication Last Dose Status   amLODIPine (NORVASC) 2.5 MG Tab  Active   amLODIPine (NORVASC) 5 MG Tab  Active   Ascorbic Acid (VITAMIN C CR) 1000 MG Tab CR  Active   ASPIRIN LOW DOSE PO  Active   azelastine (ASTELIN) 137 MCG/SPRAY nasal spray  Active   Empagliflozin (JARDIANCE) 25 MG Tab  Active   glucosamine Sulfate 500 MG Cap  Active   metFORMIN (GLUCOPHAGE) 500 MG Tab  Active   Methylsulfonylmethane (MSM) 1000 MG Cap  Active   metoprolol SR (TOPROL XL) 50 MG TABLET SR 24 HR  Active   Multiple Vitamin (MULTI-VITAMIN DAILY PO)  Active   Omega-3 Fatty Acids (FISH OIL) 1000 MG Cap capsule  Active   omeprazole (PRILOSEC) 20 MG delayed-release capsule  Active   sertraline (ZOLOFT) 50 MG Tab  Active   valsartan (DIOVAN) 320 MG tablet  Active   vitamin D3 (CHOLECALCIFEROL) 1000 Unit (25 mcg) Tab  Active   Zinc Sulfate (ZINC-220 PO)  Active                    ALLERGIES  No Known Allergies    PHYSICAL EXAM  VITAL SIGNS: BP (!) 160/85   Pulse 84   Temp 36.3 °C (97.4 °F) (Temporal)   Resp 16   Ht 1.778 m (5' 10\")   Wt 92.7 kg (204 lb 5.9 oz)   SpO2 95%   BMI 29.32 kg/m²    ENT: Erythematous posterior pharynx  Respiratory: Clear lung sounds.  No wheezing, stridor, " crackles  Cardiac: Regular rate and rhythm  Neck: Trachea midline  Lymphatics: No adenopathy  Neurologic: Speech clear.  Facial red symmetric.  Sensation and strength intact    DIAGNOSTIC STUDIES / PROCEDURES    LABS  Results for orders placed or performed during the hospital encounter of 02/10/24   CBC WITH DIFFERENTIAL   Result Value Ref Range    WBC 9.9 4.8 - 10.8 K/uL    RBC 6.49 (H) 4.70 - 6.10 M/uL    Hemoglobin 14.6 14.0 - 18.0 g/dL    Hematocrit 46.5 42.0 - 52.0 %    MCV 71.6 (L) 81.4 - 97.8 fL    MCH 22.5 (L) 27.0 - 33.0 pg    MCHC 31.4 (L) 32.3 - 36.5 g/dL    RDW 43.6 35.9 - 50.0 fL    Platelet Count 292 164 - 446 K/uL    MPV 9.0 9.0 - 12.9 fL    Neutrophils-Polys 66.60 44.00 - 72.00 %    Lymphocytes 21.50 (L) 22.00 - 41.00 %    Monocytes 9.00 0.00 - 13.40 %    Eosinophils 1.60 0.00 - 6.90 %    Basophils 0.90 0.00 - 1.80 %    Immature Granulocytes 0.40 0.00 - 0.90 %    Nucleated RBC 0.00 0.00 - 0.20 /100 WBC    Neutrophils (Absolute) 6.55 1.82 - 7.42 K/uL    Lymphs (Absolute) 2.12 1.00 - 4.80 K/uL    Monos (Absolute) 0.89 (H) 0.00 - 0.85 K/uL    Eos (Absolute) 0.16 0.00 - 0.51 K/uL    Baso (Absolute) 0.09 0.00 - 0.12 K/uL    Immature Granulocytes (abs) 0.04 0.00 - 0.11 K/uL    NRBC (Absolute) 0.00 K/uL   COMP METABOLIC PANEL   Result Value Ref Range    Sodium 138 135 - 145 mmol/L    Potassium 4.3 3.6 - 5.5 mmol/L    Chloride 105 96 - 112 mmol/L    Co2 20 20 - 33 mmol/L    Anion Gap 13.0 7.0 - 16.0    Glucose 119 (H) 65 - 99 mg/dL    Bun 25 (H) 8 - 22 mg/dL    Creatinine 0.98 0.50 - 1.40 mg/dL    Calcium 9.0 8.5 - 10.5 mg/dL    Correct Calcium 8.6 8.5 - 10.5 mg/dL    AST(SGOT) 53 (H) 12 - 45 U/L    ALT(SGPT) 101 (H) 2 - 50 U/L    Alkaline Phosphatase 76 30 - 99 U/L    Total Bilirubin 0.6 0.1 - 1.5 mg/dL    Albumin 4.5 3.2 - 4.9 g/dL    Total Protein 7.5 6.0 - 8.2 g/dL    Globulin 3.0 1.9 - 3.5 g/dL    A-G Ratio 1.5 g/dL   Group A Strep by PCR    Specimen: Throat   Result Value Ref Range    Group A Strep by  PCR Not Detected Not Detected   ESTIMATED GFR   Result Value Ref Range    GFR (CKD-EPI) 83 >60 mL/min/1.73 m 2   POC CoV-2, FLU A/B, RSV by PCR   Result Value Ref Range    POC Influenza A RNA, PCR Negative Negative    POC Influenza B RNA, PCR Negative Negative    POC RSV, by PCR Negative Negative    POC SARS-CoV-2, PCR NotDetected          RADIOLOGY  I have independently interpreted the diagnostic imaging associated with this visit and am waiting the final reading from the radiologist.   My preliminary interpretation is as follows: No large fluid collection or mass effect on the airway noted on CT scan of the neck.  Radiologist interpretation:   CT-SOFT TISSUE NECK WITH   Final Result      1.  Prominent posterior disc osteophyte complex at the C5-6 level with suggestion of severe degenerative central canal stenosis. Recommend further workup with cervical spine MRI.   2.  3 small nodules in the lung apices. There is one nodule which is interval new since the previous chest CT 10/27/2022, the other 2 are stable.   3.  13 x 8 mm nodule in the right parotid gland. Question parotid mass versus intraparotid lymph node. This may be further assessed with focused ultrasound.            COURSE & MEDICAL DECISION MAKING    ED Observation Status? No; Patient does not meet criteria for ED Observation.     INITIAL ASSESSMENT, COURSE AND PLAN  Care Narrative: Patient presents with difficulty swallowing over the last 4 days since a choking episode.  I was concerned about possible foreign body in the neck, neck mass or infection.  CT scan of the neck was obtained which was negative for acute neck findings.  Several incidental findings were found however, these were discussed with the patient and his wife both.  He has a new nodule in his upper lungs, he is advised follow-up with primary care doctor, recommendations for follow-up CT scan in 1 year per note from radiologist.  Patient has a right parotid gland nodule, recommendation  for focused ultrasound was stated to the patient, he is advised to have this performed through his primary care doctor as an outpatient.  Patient has been given referral to spine surgery.  When we discussed the degenerative change at C5-6 with possible cervical spine stenosis, he did relate several months ago feeling tingling in his arms which subsequently resolved.  Outpatient follow-up with spine surgery, recommended outpatient MRI by radiology were both discussed with the patient.  Patient given return precautions.  I suspect the choking episode has injured the musculature of his neck.  He is currently able to tolerate oral intake without further choking.  We discussed return the emerged part for worsening of symptoms.  I have discussed him following up with ear nose throat if not improving and that he may require a swallow study in the future.        ADDITIONAL PROBLEM LIST  Lung nodule: Primary care follow-up    Dysphagia: ENT follow-up    Right parotid gland nodule: Primary care follow-up for focused ultrasound    C5/6 osteophyte complex with degenerative change and possible cervical spinal canal stenosis.  Patient is neurologically intact, has been referred to spine surgery as an outpatient, recommended follow-up MRI as an outpatient through either primary care doctor or spine specialist     DISPOSITION AND DISCUSSIONS    Escalation of care considered, and ultimately not performed:acute inpatient care management, however at this time, the patient is most appropriate for outpatient management    Barriers to care at this time, including but not limited to:  None .     Decision tools and prescription drugs considered including, but not limited to: Antibiotics were not indicated, no evidence of bacterial infection .    FINAL DIAGNOSIS  1. Dysphagia, unspecified type    2. Lung nodule    3. Nodule of parotid gland    4. Degenerative cervical spinal stenosis           Electronically signed by: Lacho Barrios,  M.D., 2/10/2024 12:42 PM

## 2024-02-12 ENCOUNTER — TELEPHONE (OUTPATIENT)
Dept: MEDICAL GROUP | Facility: MEDICAL CENTER | Age: 69
End: 2024-02-12
Payer: MEDICARE

## 2024-02-13 NOTE — TELEPHONE ENCOUNTER
"----- Message from Melinda Zuniga sent at 2/12/2024  7:32 AM PST -----  Regarding: FW: Customer Service: Referrals  Contact: [phone]    ----- Message -----  From: Raf Spear \"[place]\"  Sent: 2/10/2024   8:04 PM PST  To: [facility] Engagement Team  Subject: Customer Service: Referrals                      Customer Service request regarding: Referrals   Regarding coverage: [facility] / [facility] Preferred HMO [place] Plan   Regarding member: Raf Spear    Referral #: [id number]    Comment:  This referral is blank. The discharge paperwork has   Otolaryngology   Diallo Huerta MD  [address] [place] [id number] [phone]    Who will fix this so it will be aporoved?    "

## 2024-02-29 ENCOUNTER — HOSPITAL ENCOUNTER (OUTPATIENT)
Dept: LAB | Facility: MEDICAL CENTER | Age: 69
End: 2024-02-29
Attending: FAMILY MEDICINE
Payer: MEDICARE

## 2024-02-29 DIAGNOSIS — R80.9 TYPE 2 DIABETES MELLITUS WITH MICROALBUMINURIA, WITHOUT LONG-TERM CURRENT USE OF INSULIN (HCC): ICD-10-CM

## 2024-02-29 DIAGNOSIS — E11.29 TYPE 2 DIABETES MELLITUS WITH MICROALBUMINURIA, WITHOUT LONG-TERM CURRENT USE OF INSULIN (HCC): ICD-10-CM

## 2024-02-29 LAB
ANION GAP SERPL CALC-SCNC: 12 MMOL/L (ref 7–16)
BUN SERPL-MCNC: 20 MG/DL (ref 8–22)
CALCIUM SERPL-MCNC: 9.4 MG/DL (ref 8.5–10.5)
CHLORIDE SERPL-SCNC: 106 MMOL/L (ref 96–112)
CO2 SERPL-SCNC: 22 MMOL/L (ref 20–33)
CREAT SERPL-MCNC: 0.98 MG/DL (ref 0.5–1.4)
GFR SERPLBLD CREATININE-BSD FMLA CKD-EPI: 83 ML/MIN/1.73 M 2
GLUCOSE SERPL-MCNC: 135 MG/DL (ref 65–99)
POTASSIUM SERPL-SCNC: 4.4 MMOL/L (ref 3.6–5.5)
SODIUM SERPL-SCNC: 140 MMOL/L (ref 135–145)

## 2024-02-29 PROCEDURE — 36415 COLL VENOUS BLD VENIPUNCTURE: CPT

## 2024-02-29 PROCEDURE — 80048 BASIC METABOLIC PNL TOTAL CA: CPT

## 2024-03-13 ENCOUNTER — HOSPITAL ENCOUNTER (OUTPATIENT)
Dept: RADIOLOGY | Facility: MEDICAL CENTER | Age: 69
End: 2024-03-13
Attending: FAMILY MEDICINE
Payer: MEDICARE

## 2024-03-13 DIAGNOSIS — I77.810 ASCENDING AORTA DILATION (HCC): ICD-10-CM

## 2024-03-13 PROCEDURE — 71275 CT ANGIOGRAPHY CHEST: CPT

## 2024-03-13 PROCEDURE — 700117 HCHG RX CONTRAST REV CODE 255: Performed by: FAMILY MEDICINE

## 2024-03-13 RX ADMIN — IOHEXOL 100 ML: 350 INJECTION, SOLUTION INTRAVENOUS at 10:52

## 2024-03-20 ENCOUNTER — APPOINTMENT (OUTPATIENT)
Dept: RADIOLOGY | Facility: MEDICAL CENTER | Age: 69
End: 2024-03-20
Payer: MEDICARE

## 2024-03-21 ENCOUNTER — OFFICE VISIT (OUTPATIENT)
Dept: MEDICAL GROUP | Facility: MEDICAL CENTER | Age: 69
End: 2024-03-21
Payer: MEDICARE

## 2024-03-21 ENCOUNTER — APPOINTMENT (OUTPATIENT)
Dept: MEDICAL GROUP | Facility: MEDICAL CENTER | Age: 69
End: 2024-03-21
Payer: MEDICARE

## 2024-03-21 ENCOUNTER — PATIENT MESSAGE (OUTPATIENT)
Dept: MEDICAL GROUP | Facility: MEDICAL CENTER | Age: 69
End: 2024-03-21

## 2024-03-21 VITALS
RESPIRATION RATE: 16 BRPM | WEIGHT: 191.8 LBS | TEMPERATURE: 97.3 F | SYSTOLIC BLOOD PRESSURE: 130 MMHG | BODY MASS INDEX: 27.46 KG/M2 | DIASTOLIC BLOOD PRESSURE: 62 MMHG | OXYGEN SATURATION: 96 % | HEART RATE: 83 BPM | HEIGHT: 70 IN

## 2024-03-21 DIAGNOSIS — K11.8 PAROTID MASS: ICD-10-CM

## 2024-03-21 DIAGNOSIS — M48.02 CERVICAL SPINAL STENOSIS: ICD-10-CM

## 2024-03-21 DIAGNOSIS — M50.30 DDD (DEGENERATIVE DISC DISEASE), CERVICAL: ICD-10-CM

## 2024-03-21 DIAGNOSIS — K80.20 CALCULUS OF GALLBLADDER WITHOUT CHOLECYSTITIS WITHOUT OBSTRUCTION: ICD-10-CM

## 2024-03-21 DIAGNOSIS — K76.0 FATTY LIVER: ICD-10-CM

## 2024-03-21 DIAGNOSIS — G95.9 MYELOPATHY (HCC): ICD-10-CM

## 2024-03-21 DIAGNOSIS — R91.8 LUNG NODULES: ICD-10-CM

## 2024-03-21 DIAGNOSIS — N20.0 KIDNEY STONE: ICD-10-CM

## 2024-03-21 PROCEDURE — 3078F DIAST BP <80 MM HG: CPT | Performed by: FAMILY MEDICINE

## 2024-03-21 PROCEDURE — 99214 OFFICE O/P EST MOD 30 MIN: CPT | Performed by: FAMILY MEDICINE

## 2024-03-21 PROCEDURE — 3075F SYST BP GE 130 - 139MM HG: CPT | Performed by: FAMILY MEDICINE

## 2024-03-21 ASSESSMENT — FIBROSIS 4 INDEX: FIB4 SCORE: 1.26

## 2024-03-21 ASSESSMENT — ENCOUNTER SYMPTOMS
NECK PAIN: 1
CHILLS: 0
PALPITATIONS: 0
FEVER: 0

## 2024-03-21 NOTE — PROGRESS NOTES
Verbal consent was acquired by the patient to use [employer] ambient listening note generation during this visit.      Preston was seen today for follow-up.    Diagnoses and all orders for this visit:    Lung nodules  -     Cancel: CT-CHEST (THORAX) WITH; Future  -     CT-CHEST (THORAX) WITH; Future    Fatty liver    Calculus of gallbladder without cholecystitis without obstruction    Kidney stone    Parotid mass                  Assessment & Plan  1. Lung nodules.  This is a chronic condition, unstable. He had some new pulmonary nodules seen on recent CT that was done on 03/13/2024. This nodule is about 4 mm x 7 mm in size. Previous nodules were stable, which was right apical and middle lobe nodules. A 3-month follow-up is recommended. I ordered CT. If the CT shows abnormality in nodule, then I recommend seeing pulmonology.    2. Fatty liver.  This is a chronic condition, stable. His liver function has been actually unstable. His liver function is elevated when compared to the previous numbers. We will hold on to starting cholesterol medication as his cholesterol numbers are good and his liver function are elevated. I recommend to eat healthy diet and exercise and do not drink alcohol.    3. Gallbladder stone.  This is a new condition, unstable, stable. Currently, he does not have any symptoms. If he starts experiencing right upper quadrant pain, then that would be the time to go to the surgeon to get them removed.    4. Kidney stones.  New condition, stable currently. He does have a history of kidney stones in the past. Recent CT showed non-obstructing small left kidney stone. We will monitor for symptoms closely. I will check CT scan if he starts experiencing any symptoms.    5. Parotid mass.  This is a new condition. He was seen in ER and they did CT soft tissue neck on 02/10/2024, which showed prominent 13 into 8 mm nodule in right parotid gland, questionable parotid mass versus intraparotid lymph node. This may  be further assessed with focused ultrasound. He has a lot going on. He is doing multiple imagings and seeing multiple providers. We will hold on to doing further evaluation for this. Once he is done with orthopedics, gastro, and when I see him back in 05/2024, then we will decide about doing this ultrasound.    Follow-up  The patient will follow up in 05/2024 for follow-up for labs as well as for this imaging.          Chief complaint::Diagnoses of Lung nodules, Fatty liver, Calculus of gallbladder without cholecystitis without obstruction, Kidney stone, and Parotid mass were pertinent to this visit.      History of Present Illness  The patient is a 68-year-old male who is here with his wife today to discuss about CT scan results that was done on 03/13/2024. He is accompanied by his wife.    He saw his pulmonologist, Dr. Stauffer, a while back who explained to him that some nodules will pop up. He has not seen his pulmonologist in over a year. Dr. Giron prescribed him an inhaler.    He has had fatty infiltration in his liver for a while.    He had severe difficulty swallowing on 02/03/2024 that affected his inhaling and exhaling, but no sore throat. His voice is still not right. His saliva felt like he had big pieces of cotton in his mouth and every time he tried to swallow, it would not go down his throat. He would have to tip his head back and get some of it swallowed, but some of it would come back up. He had it for 2.5 weeks. He saw gastroenterology and had an endoscopy. They found some irregularities on the mucosa. They took some biopsies, results are pending. They found gastritis and reflux symptoms. He does take omeprazole. It took 2.5 weeks to get the test done. He ate nothing, had Gatorade and water for 2.5 to 3 days, and then he was able to graduate to chicken and soup. They could not get an ENT appointment until next month. He was supposed to do a swallow study yesterday, but he had not been feeling  well. Since the endoscopy, he has had nausea, vomiting, diarrhea, and absolutely no appetite for days. He started to get better yesterday.    He has had kidney stones in the past.    He had an x-ray and an MRI ordered. He is dragging his feet because he is tired of these doctor's appointments. He has been complaining of tingling and numbness in his hands once in a while.  He denies any problem in the parotid gland.       Review of Systems   Constitutional:  Negative for chills and fever.   Cardiovascular:  Negative for chest pain, palpitations and leg swelling.   Gastrointestinal:         Difficulty swallowing, nausea symptoms which are getting much better now.   Musculoskeletal:  Positive for neck pain.          Medications and Allergies:     Current Outpatient Medications   Medication Sig Dispense Refill    azelastine (ASTELIN) 137 MCG/SPRAY nasal spray Administer 1 Spray into affected nostril(S) 2 times a day. 90 mL 1    metFORMIN (GLUCOPHAGE) 500 MG Tab TAKE 2 TABLETS BY MOUTH 2 TIMES A DAY WITH MEALS. 400 Tablet 3    amLODIPine (NORVASC) 5 MG Tab Take 1 Tablet by mouth every day. For high blood pressure 100 Tablet 3    omeprazole (PRILOSEC) 20 MG delayed-release capsule Take 1 Capsule by mouth every morning on an empty stomach. For reflux symptoms 100 Capsule 3    metoprolol SR (TOPROL XL) 50 MG TABLET SR 24 HR Take 1 Tablet by mouth every day. For heart and blood pressure 100 Tablet 3    amLODIPine (NORVASC) 2.5 MG Tab Take 1 Tablet by mouth every day. Along with amlodipine 5 mg for today of 7.5 mg/day for blood pressure 100 Tablet 3    sertraline (ZOLOFT) 50 MG Tab Take 1 Tablet by mouth every day. For anxiety 100 Tablet 3    Empagliflozin (JARDIANCE) 25 MG Tab Take 1 Tablet by mouth every day. 100 Tablet 3    valsartan (DIOVAN) 320 MG tablet Take 1 Tablet by mouth every day. 100 Tablet 3    Omega-3 Fatty Acids (FISH OIL) 1000 MG Cap capsule Take 1 Capsule by mouth 3 times a day with meals.      Zinc Sulfate  "(ZINC-220 PO) Take  by mouth.      Ascorbic Acid (VITAMIN C CR) 1000 MG Tab CR Take  by mouth.      vitamin D3 (CHOLECALCIFEROL) 1000 Unit (25 mcg) Tab Take 1 Tablet by mouth every day.      glucosamine Sulfate 500 MG Cap Take 1 Capsule by mouth 3 times a day with meals.      ASPIRIN LOW DOSE PO Take 5 mg by mouth.      Methylsulfonylmethane (MSM) 1000 MG Cap Take 1 Capsule by mouth.      Multiple Vitamin (MULTI-VITAMIN DAILY PO) Take  by mouth.       No current facility-administered medications for this visit.       /62   Pulse 83   Temp 36.3 °C (97.3 °F)   Resp 16   Ht 1.778 m (5' 10\")   Wt 87 kg (191 lb 12.8 oz)   SpO2 96% , Body mass index is 27.52 kg/m².      Physical Exam  Constitutional:       Appearance: Normal appearance. He is well-developed and well-groomed.   HENT:      Head: Normocephalic and atraumatic.      Right Ear: External ear normal.      Left Ear: External ear normal.   Eyes:      General:         Right eye: No discharge.         Left eye: No discharge.      Conjunctiva/sclera: Conjunctivae normal.   Cardiovascular:      Rate and Rhythm: Normal rate.   Pulmonary:      Effort: Pulmonary effort is normal. No respiratory distress.   Musculoskeletal:      Cervical back: Neck supple.   Skin:     Findings: No rash.   Neurological:      Mental Status: He is alert.   Psychiatric:         Mood and Affect: Mood and affect normal.         Behavior: Behavior normal.              Results  Imaging  CT scan of the chest from 03/13/2024 was reviewed with the patient.          Please note that this dictation was created using voice recognition software. I have made every reasonable attempt to correct obvious errors, but I expect that there are errors of grammar and possibly content that I did not discover before finalizing the note.      "

## 2024-04-02 ENCOUNTER — APPOINTMENT (OUTPATIENT)
Dept: MEDICAL GROUP | Facility: MEDICAL CENTER | Age: 69
End: 2024-04-02
Payer: MEDICARE

## 2024-04-02 PROCEDURE — RXMED WILLOW AMBULATORY MEDICATION CHARGE: Performed by: FAMILY MEDICINE

## 2024-04-04 ENCOUNTER — PHARMACY VISIT (OUTPATIENT)
Dept: PHARMACY | Facility: MEDICAL CENTER | Age: 69
End: 2024-04-04
Payer: COMMERCIAL

## 2024-04-05 ENCOUNTER — TELEPHONE (OUTPATIENT)
Dept: HEALTH INFORMATION MANAGEMENT | Facility: OTHER | Age: 69
End: 2024-04-05

## 2024-04-11 ENCOUNTER — HOSPITAL ENCOUNTER (OUTPATIENT)
Dept: RADIOLOGY | Facility: MEDICAL CENTER | Age: 69
End: 2024-04-11
Payer: MEDICARE

## 2024-04-11 DIAGNOSIS — R12 HEARTBURN: ICD-10-CM

## 2024-04-11 DIAGNOSIS — I10 ESSENTIAL HYPERTENSION, MALIGNANT: ICD-10-CM

## 2024-04-11 DIAGNOSIS — R05.1 ACUTE COUGH: ICD-10-CM

## 2024-04-11 DIAGNOSIS — R09.89 ABNORMAL CHEST SOUNDS: ICD-10-CM

## 2024-04-11 DIAGNOSIS — F17.200 TOBACCO USE DISORDER: ICD-10-CM

## 2024-04-11 DIAGNOSIS — R49.9 VOICE IMPAIRMENT: ICD-10-CM

## 2024-04-11 DIAGNOSIS — R63.4 LOSS OF WEIGHT: ICD-10-CM

## 2024-04-11 DIAGNOSIS — E78.2 MIXED HYPERLIPIDEMIA: ICD-10-CM

## 2024-04-11 DIAGNOSIS — R13.10 PROBLEMS WITH SWALLOWING AND MASTICATION: ICD-10-CM

## 2024-04-11 PROCEDURE — 700117 HCHG RX CONTRAST REV CODE 255

## 2024-04-11 PROCEDURE — 74220 X-RAY XM ESOPHAGUS 1CNTRST: CPT

## 2024-04-11 RX ADMIN — BARIUM SULFATE 700 MG: 700 TABLET ORAL at 14:22

## 2024-05-04 PROCEDURE — RXMED WILLOW AMBULATORY MEDICATION CHARGE: Performed by: FAMILY MEDICINE

## 2024-05-06 PROCEDURE — RXMED WILLOW AMBULATORY MEDICATION CHARGE: Performed by: FAMILY MEDICINE

## 2024-05-08 ENCOUNTER — PHARMACY VISIT (OUTPATIENT)
Dept: PHARMACY | Facility: MEDICAL CENTER | Age: 69
End: 2024-05-08
Payer: COMMERCIAL

## 2024-05-15 ENCOUNTER — HOSPITAL ENCOUNTER (OUTPATIENT)
Dept: LAB | Facility: MEDICAL CENTER | Age: 69
End: 2024-05-15
Attending: FAMILY MEDICINE
Payer: MEDICARE

## 2024-05-15 DIAGNOSIS — E11.29 TYPE 2 DIABETES MELLITUS WITH MICROALBUMINURIA, WITHOUT LONG-TERM CURRENT USE OF INSULIN (HCC): ICD-10-CM

## 2024-05-15 DIAGNOSIS — R80.9 TYPE 2 DIABETES MELLITUS WITH MICROALBUMINURIA, WITHOUT LONG-TERM CURRENT USE OF INSULIN (HCC): ICD-10-CM

## 2024-05-15 LAB
ALBUMIN SERPL BCP-MCNC: 4.3 G/DL (ref 3.2–4.9)
ALBUMIN/GLOB SERPL: 1.5 G/DL
ALP SERPL-CCNC: 75 U/L (ref 30–99)
ALT SERPL-CCNC: 51 U/L (ref 2–50)
ANION GAP SERPL CALC-SCNC: 11 MMOL/L (ref 7–16)
AST SERPL-CCNC: 29 U/L (ref 12–45)
BILIRUB SERPL-MCNC: 0.3 MG/DL (ref 0.1–1.5)
BUN SERPL-MCNC: 16 MG/DL (ref 8–22)
CALCIUM ALBUM COR SERPL-MCNC: 9 MG/DL (ref 8.5–10.5)
CALCIUM SERPL-MCNC: 9.2 MG/DL (ref 8.5–10.5)
CHLORIDE SERPL-SCNC: 107 MMOL/L (ref 96–112)
CO2 SERPL-SCNC: 22 MMOL/L (ref 20–33)
CREAT SERPL-MCNC: 0.98 MG/DL (ref 0.5–1.4)
CREAT UR-MCNC: 141.66 MG/DL
EST. AVERAGE GLUCOSE BLD GHB EST-MCNC: 143 MG/DL
GFR SERPLBLD CREATININE-BSD FMLA CKD-EPI: 82 ML/MIN/1.73 M 2
GLOBULIN SER CALC-MCNC: 2.9 G/DL (ref 1.9–3.5)
GLUCOSE SERPL-MCNC: 119 MG/DL (ref 65–99)
HBA1C MFR BLD: 6.6 % (ref 4–5.6)
MICROALBUMIN UR-MCNC: 64.2 MG/DL
MICROALBUMIN/CREAT UR: 453 MG/G (ref 0–30)
POTASSIUM SERPL-SCNC: 4.4 MMOL/L (ref 3.6–5.5)
PROT SERPL-MCNC: 7.2 G/DL (ref 6–8.2)
SODIUM SERPL-SCNC: 140 MMOL/L (ref 135–145)
VIT B12 SERPL-MCNC: 793 PG/ML (ref 211–911)

## 2024-05-22 ENCOUNTER — APPOINTMENT (OUTPATIENT)
Dept: MEDICAL GROUP | Facility: MEDICAL CENTER | Age: 69
End: 2024-05-22
Payer: MEDICARE

## 2024-05-22 VITALS
RESPIRATION RATE: 16 BRPM | DIASTOLIC BLOOD PRESSURE: 60 MMHG | WEIGHT: 191 LBS | SYSTOLIC BLOOD PRESSURE: 128 MMHG | HEIGHT: 70 IN | OXYGEN SATURATION: 96 % | BODY MASS INDEX: 27.35 KG/M2 | HEART RATE: 71 BPM

## 2024-05-22 DIAGNOSIS — D17.1 LIPOMA OF TORSO: ICD-10-CM

## 2024-05-22 DIAGNOSIS — I10 ESSENTIAL HYPERTENSION: ICD-10-CM

## 2024-05-22 DIAGNOSIS — Z12.5 SCREENING FOR PROSTATE CANCER: ICD-10-CM

## 2024-05-22 DIAGNOSIS — H61.22 IMPACTED CERUMEN OF LEFT EAR: ICD-10-CM

## 2024-05-22 DIAGNOSIS — E11.29 TYPE 2 DIABETES MELLITUS WITH MICROALBUMINURIA, WITHOUT LONG-TERM CURRENT USE OF INSULIN (HCC): ICD-10-CM

## 2024-05-22 DIAGNOSIS — R80.9 TYPE 2 DIABETES MELLITUS WITH MICROALBUMINURIA, WITHOUT LONG-TERM CURRENT USE OF INSULIN (HCC): ICD-10-CM

## 2024-05-22 PROCEDURE — 3078F DIAST BP <80 MM HG: CPT | Performed by: FAMILY MEDICINE

## 2024-05-22 PROCEDURE — 99214 OFFICE O/P EST MOD 30 MIN: CPT | Performed by: FAMILY MEDICINE

## 2024-05-22 PROCEDURE — 3074F SYST BP LT 130 MM HG: CPT | Performed by: FAMILY MEDICINE

## 2024-05-22 PROCEDURE — RXMED WILLOW AMBULATORY MEDICATION CHARGE

## 2024-05-22 ASSESSMENT — ENCOUNTER SYMPTOMS
CHILLS: 0
FEVER: 0
PALPITATIONS: 0

## 2024-05-22 ASSESSMENT — FIBROSIS 4 INDEX: FIB4 SCORE: 0.99

## 2024-05-22 NOTE — PROGRESS NOTES
Verbal consent was acquired by the patient to use [employer] ambient listening note generation during this visit.      Preston was seen today for follow-up, lab results, cerumen impaction and bump.    Diagnoses and all orders for this visit:    Type 2 diabetes mellitus with microalbuminuria, without long-term current use of insulin (HCC)  -     Comp Metabolic Panel; Future  -     HEMOGLOBIN A1C; Future  -     Lipid Profile; Future  -     MICROALBUMIN CREAT RATIO URINE; Future  -     VITAMIN B12; Future    Essential hypertension    Screening for prostate cancer  -     PROSTATE SPECIFIC AG SCREENING; Future                  Assessment & Plan  1. Type 2 diabetes mellitus.  This is a chronic condition, currently stable. The patient's A1c levels are showing signs of improvement. The patient is advised to maintain the current dosage of metformin and Jardiance.    2. Essential hypertension.  This is a chronic condition, currently stable. The patient is to continue with the current regimen of amlodipine, metoprolol, and valsartan.    3. Lipoma of the back.  This is a new and unstable condition. The patient is advised to follow up with dermatology to discuss potential removal of the lipoma.    4. Cerumen impaction in the left ear.  This condition is a new and unstable. The patient has a deep cerumen impaction. The patient is advised to follow up with ENT for cerumen removal.    Follow-up  The patient is scheduled for lab follow-up in 6 months.          Chief complaint::Diagnoses of Type 2 diabetes mellitus with microalbuminuria, without long-term current use of insulin (HCC), Essential hypertension, and Screening for prostate cancer were pertinent to this visit.      History of Present Illness  The patient is a 69-year-old male who is here with his wife today to discuss about blood test results. Blood test was done on 05/15/2024. He is accompanied by his wife.    The patient has discontinued his B12 supplementation.    Since  his last visit, the patient has been experiencing dysphagia for several weeks. He sought emergency care where a swallowing study was performed, which yielded normal results. An endoscopy was performed by gastroenterology, revealing Pacheco's esophagus. A follow-up endoscopy is recommended in 3 years. The patient's omeprazole dosage was recently increased, and a colonoscopy is planned concurrently.    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 The patient reports changes in his voice due to discomfort. A follow-up appointment is scheduled for 07/2024. An ultrasound of the head and neck is planned by the ENT specialist.    A CT scan performed in the ER revealed degenerative stenosis in his neck. Patient is asymptomatic, no intervention is deemed necessary by ortho. An MRI is planned for further evaluation.    The patient reports a non-painful lipoma on his back, which has been present for an extended period and has recently increased in size. He had a lipoma excised approximately 30 years ago, which resulted in a scar adjacent to it.      Review of Systems   Constitutional:  Negative for chills and fever.   Cardiovascular:  Negative for chest pain, palpitations and leg swelling.   Skin:         Mass of back          Medications and Allergies:     Current Outpatient Medications   Medication Sig Dispense Refill    tizanidine (ZANAFLEX) 2 MG tablet Take 1 tablet every 8 hours by oral route as needed for 30 days. 90 Tablet 0    azelastine (ASTELIN) 137 MCG/SPRAY nasal spray Administer 1 Spray into affected nostril(S) 2 times a day. 90 mL 1    metFORMIN (GLUCOPHAGE) 500 MG Tab TAKE 2 TABLETS BY MOUTH 2 TIMES A DAY WITH MEALS. 400 Tablet 3    amLODIPine (NORVASC) 5 MG Tab Take 1 Tablet by mouth every day. For high blood pressure 100 Tablet 3    metoprolol SR (TOPROL XL) 50 MG TABLET SR 24 HR Take 1 Tablet by mouth every day. For heart and blood pressure 100 Tablet 3    amLODIPine (NORVASC)  "2.5 MG Tab Take 1 Tablet by mouth every day. Along with amlodipine 5 mg for today of 7.5 mg/day for blood pressure 100 Tablet 3    sertraline (ZOLOFT) 50 MG Tab Take 1 Tablet by mouth every day. For anxiety 100 Tablet 3    Empagliflozin (JARDIANCE) 25 MG Tab Take 1 Tablet by mouth every day. 100 Tablet 3    valsartan (DIOVAN) 320 MG tablet Take 1 Tablet by mouth every day. 100 Tablet 3    Omega-3 Fatty Acids (FISH OIL) 1000 MG Cap capsule Take 1 Capsule by mouth 3 times a day with meals.      Zinc Sulfate (ZINC-220 PO) Take  by mouth.      Ascorbic Acid (VITAMIN C CR) 1000 MG Tab CR Take  by mouth.      vitamin D3 (CHOLECALCIFEROL) 1000 Unit (25 mcg) Tab Take 1 Tablet by mouth every day.      glucosamine Sulfate 500 MG Cap Take 1 Capsule by mouth 3 times a day with meals.      ASPIRIN LOW DOSE PO Take 5 mg by mouth.      Methylsulfonylmethane (MSM) 1000 MG Cap Take 1 Capsule by mouth.      Multiple Vitamin (MULTI-VITAMIN DAILY PO) Take  by mouth.      omeprazole (PRILOSEC) 20 MG delayed-release capsule Take 1 capsule by mouth twice a day 30 min before meals 180 Capsule 4     No current facility-administered medications for this visit.       /60   Pulse 71   Resp 16   Ht 1.778 m (5' 10\")   Wt 86.6 kg (191 lb)   SpO2 96% , Body mass index is 27.41 kg/m².      Physical Exam  Constitutional:       Appearance: Normal appearance. He is well-developed and well-groomed.   HENT:      Head: Normocephalic and atraumatic.      Right Ear: External ear normal.      Left Ear: There is impacted cerumen.   Eyes:      General:         Right eye: No discharge.         Left eye: No discharge.      Conjunctiva/sclera: Conjunctivae normal.   Cardiovascular:      Rate and Rhythm: Normal rate.   Pulmonary:      Effort: Pulmonary effort is normal. No respiratory distress.   Musculoskeletal:      Cervical back: Neck supple.      Comments: Non tender nonerythematous mass palpated at middle back   Neurological:      Mental Status: " He is alert.   Psychiatric:         Mood and Affect: Mood and affect normal.         Behavior: Behavior normal.            I reviewed with patient lab results resulted on 5/15/2024.        Please note that this dictation was created using voice recognition software. I have made every reasonable attempt to correct obvious errors, but I expect that there are errors of grammar and possibly content that I did not discover before finalizing the note.

## 2024-05-23 ENCOUNTER — PHARMACY VISIT (OUTPATIENT)
Dept: PHARMACY | Facility: MEDICAL CENTER | Age: 69
End: 2024-05-23
Payer: COMMERCIAL

## 2024-06-20 ENCOUNTER — HOSPITAL ENCOUNTER (OUTPATIENT)
Dept: RADIOLOGY | Facility: MEDICAL CENTER | Age: 69
End: 2024-06-20
Attending: OTOLARYNGOLOGY
Payer: MEDICARE

## 2024-06-20 DIAGNOSIS — D37.030 NEOPLASM OF UNCERTAIN BEHAVIOR OF PAROTID GLAND: ICD-10-CM

## 2024-06-20 PROCEDURE — 76536 US EXAM OF HEAD AND NECK: CPT

## 2024-06-25 ENCOUNTER — HOSPITAL ENCOUNTER (OUTPATIENT)
Dept: RADIOLOGY | Facility: MEDICAL CENTER | Age: 69
End: 2024-06-25
Attending: FAMILY MEDICINE
Payer: MEDICARE

## 2024-06-25 DIAGNOSIS — R91.8 LUNG NODULES: ICD-10-CM

## 2024-06-25 PROCEDURE — 71260 CT THORAX DX C+: CPT

## 2024-06-25 PROCEDURE — 700117 HCHG RX CONTRAST REV CODE 255: Performed by: FAMILY MEDICINE

## 2024-06-25 RX ADMIN — IOHEXOL 75 ML: 350 INJECTION, SOLUTION INTRAVENOUS at 13:46

## 2024-07-17 DIAGNOSIS — R80.9 TYPE 2 DIABETES MELLITUS WITH MICROALBUMINURIA, WITHOUT LONG-TERM CURRENT USE OF INSULIN (HCC): ICD-10-CM

## 2024-07-17 DIAGNOSIS — E11.29 TYPE 2 DIABETES MELLITUS WITH MICROALBUMINURIA, WITHOUT LONG-TERM CURRENT USE OF INSULIN (HCC): ICD-10-CM

## 2024-07-17 PROCEDURE — RXMED WILLOW AMBULATORY MEDICATION CHARGE: Performed by: FAMILY MEDICINE

## 2024-07-18 ENCOUNTER — PHARMACY VISIT (OUTPATIENT)
Dept: PHARMACY | Facility: MEDICAL CENTER | Age: 69
End: 2024-07-18
Payer: COMMERCIAL

## 2024-07-23 ENCOUNTER — HOSPITAL ENCOUNTER (OUTPATIENT)
Dept: RADIOLOGY | Facility: MEDICAL CENTER | Age: 69
End: 2024-07-23
Attending: OTOLARYNGOLOGY
Payer: MEDICARE

## 2024-07-23 DIAGNOSIS — D37.030 NEOPLASM OF UNCERTAIN BEHAVIOR OF PAROTID GLAND: ICD-10-CM

## 2024-07-23 LAB — CYTOLOGY REG CYTOL: NORMAL

## 2024-07-23 PROCEDURE — 88173 CYTOPATH EVAL FNA REPORT: CPT

## 2024-07-23 PROCEDURE — 10005 FNA BX W/US GDN 1ST LES: CPT

## 2024-07-23 PROCEDURE — 88305 TISSUE EXAM BY PATHOLOGIST: CPT

## 2024-08-14 ENCOUNTER — PATIENT MESSAGE (OUTPATIENT)
Dept: MEDICAL GROUP | Facility: MEDICAL CENTER | Age: 69
End: 2024-08-14
Payer: MEDICARE

## 2024-08-14 PROCEDURE — RXMED WILLOW AMBULATORY MEDICATION CHARGE: Performed by: FAMILY MEDICINE

## 2024-08-14 PROCEDURE — RXMED WILLOW AMBULATORY MEDICATION CHARGE

## 2024-08-15 ENCOUNTER — APPOINTMENT (OUTPATIENT)
Dept: LAB | Facility: MEDICAL CENTER | Age: 69
End: 2024-08-15
Payer: MEDICARE

## 2024-08-22 ENCOUNTER — HOSPITAL ENCOUNTER (OUTPATIENT)
Dept: LAB | Facility: MEDICAL CENTER | Age: 69
End: 2024-08-22
Payer: MEDICARE

## 2024-08-22 ENCOUNTER — PHARMACY VISIT (OUTPATIENT)
Dept: PHARMACY | Facility: MEDICAL CENTER | Age: 69
End: 2024-08-22
Payer: COMMERCIAL

## 2024-08-22 ENCOUNTER — HOSPITAL ENCOUNTER (OUTPATIENT)
Dept: LAB | Facility: MEDICAL CENTER | Age: 69
End: 2024-08-22
Attending: FAMILY MEDICINE
Payer: MEDICARE

## 2024-08-22 ENCOUNTER — OFFICE VISIT (OUTPATIENT)
Dept: MEDICAL GROUP | Facility: MEDICAL CENTER | Age: 69
End: 2024-08-22
Payer: MEDICARE

## 2024-08-22 VITALS
BODY MASS INDEX: 28.09 KG/M2 | TEMPERATURE: 97.5 F | HEART RATE: 85 BPM | DIASTOLIC BLOOD PRESSURE: 82 MMHG | RESPIRATION RATE: 12 BRPM | HEIGHT: 70 IN | SYSTOLIC BLOOD PRESSURE: 124 MMHG | WEIGHT: 196.21 LBS | OXYGEN SATURATION: 95 %

## 2024-08-22 DIAGNOSIS — E11.29 TYPE 2 DIABETES MELLITUS WITH MICROALBUMINURIA, WITHOUT LONG-TERM CURRENT USE OF INSULIN (HCC): ICD-10-CM

## 2024-08-22 DIAGNOSIS — R80.9 TYPE 2 DIABETES MELLITUS WITH MICROALBUMINURIA, WITHOUT LONG-TERM CURRENT USE OF INSULIN (HCC): ICD-10-CM

## 2024-08-22 DIAGNOSIS — Z12.5 SCREENING FOR PROSTATE CANCER: ICD-10-CM

## 2024-08-22 DIAGNOSIS — Z01.818 PRE-OP EVALUATION: ICD-10-CM

## 2024-08-22 DIAGNOSIS — M48.02 DEGENERATIVE CERVICAL SPINAL STENOSIS: ICD-10-CM

## 2024-08-22 DIAGNOSIS — I45.10 RIGHT BUNDLE BRANCH BLOCK: ICD-10-CM

## 2024-08-22 LAB
25(OH)D3 SERPL-MCNC: 45 NG/ML (ref 30–100)
ALBUMIN SERPL BCP-MCNC: 4.2 G/DL (ref 3.2–4.9)
ALBUMIN SERPL BCP-MCNC: 4.3 G/DL (ref 3.2–4.9)
ALBUMIN/GLOB SERPL: 1.3 G/DL
ALBUMIN/GLOB SERPL: 1.4 G/DL
ALP SERPL-CCNC: 78 U/L (ref 30–99)
ALP SERPL-CCNC: 79 U/L (ref 30–99)
ALT SERPL-CCNC: 42 U/L (ref 2–50)
ALT SERPL-CCNC: 43 U/L (ref 2–50)
ANION GAP SERPL CALC-SCNC: 13 MMOL/L (ref 7–16)
ANION GAP SERPL CALC-SCNC: 13 MMOL/L (ref 7–16)
ANISOCYTOSIS BLD QL SMEAR: ABNORMAL
AST SERPL-CCNC: 25 U/L (ref 12–45)
AST SERPL-CCNC: 26 U/L (ref 12–45)
BASOPHILS # BLD AUTO: 0.9 % (ref 0–1.8)
BASOPHILS # BLD: 0.07 K/UL (ref 0–0.12)
BILIRUB SERPL-MCNC: 0.4 MG/DL (ref 0.1–1.5)
BILIRUB SERPL-MCNC: 0.4 MG/DL (ref 0.1–1.5)
BUN SERPL-MCNC: 18 MG/DL (ref 8–22)
BUN SERPL-MCNC: 18 MG/DL (ref 8–22)
C3 SERPL-MCNC: 143.1 MG/DL (ref 87–200)
C4 SERPL-MCNC: 13.5 MG/DL (ref 19–52)
CALCIUM ALBUM COR SERPL-MCNC: 8.9 MG/DL (ref 8.5–10.5)
CALCIUM ALBUM COR SERPL-MCNC: 9 MG/DL (ref 8.5–10.5)
CALCIUM SERPL-MCNC: 9.1 MG/DL (ref 8.4–10.2)
CALCIUM SERPL-MCNC: 9.2 MG/DL (ref 8.4–10.2)
CHLORIDE SERPL-SCNC: 103 MMOL/L (ref 96–112)
CHLORIDE SERPL-SCNC: 105 MMOL/L (ref 96–112)
CHOLEST SERPL-MCNC: 109 MG/DL (ref 100–199)
CHOLEST SERPL-MCNC: 110 MG/DL (ref 100–199)
CK SERPL-CCNC: 129 U/L (ref 0–154)
CO2 SERPL-SCNC: 21 MMOL/L (ref 20–33)
CO2 SERPL-SCNC: 22 MMOL/L (ref 20–33)
CREAT SERPL-MCNC: 0.9 MG/DL (ref 0.5–1.4)
CREAT SERPL-MCNC: 0.92 MG/DL (ref 0.5–1.4)
EOSINOPHIL # BLD AUTO: 0.19 K/UL (ref 0–0.51)
EOSINOPHIL NFR BLD: 2.5 % (ref 0–6.9)
ERYTHROCYTE [DISTWIDTH] IN BLOOD BY AUTOMATED COUNT: 45 FL (ref 35.9–50)
ERYTHROCYTE [SEDIMENTATION RATE] IN BLOOD BY WESTERGREN METHOD: 3 MM/HOUR (ref 0–20)
EST. AVERAGE GLUCOSE BLD GHB EST-MCNC: 137 MG/DL
FASTING STATUS PATIENT QL REPORTED: NORMAL
FOLATE SERPL-MCNC: 29.2 NG/ML
GFR SERPLBLD CREATININE-BSD FMLA CKD-EPI: 89 ML/MIN/1.73 M 2
GFR SERPLBLD CREATININE-BSD FMLA CKD-EPI: 91 ML/MIN/1.73 M 2
GLOBULIN SER CALC-MCNC: 3.1 G/DL (ref 1.9–3.5)
GLOBULIN SER CALC-MCNC: 3.2 G/DL (ref 1.9–3.5)
GLUCOSE SERPL-MCNC: 104 MG/DL (ref 65–99)
GLUCOSE SERPL-MCNC: 109 MG/DL (ref 65–99)
HBA1C MFR BLD: 6.4 % (ref 4–5.6)
HCT VFR BLD AUTO: 46.1 % (ref 42–52)
HCYS SERPL-SCNC: 7.96 UMOL/L
HDLC SERPL-MCNC: 27 MG/DL
HDLC SERPL-MCNC: 28 MG/DL
HGB BLD-MCNC: 13.8 G/DL (ref 14–18)
HIV 1+2 AB+HIV1 P24 AG SERPL QL IA: NORMAL
IMM GRANULOCYTES # BLD AUTO: 0.02 K/UL (ref 0–0.11)
IMM GRANULOCYTES NFR BLD AUTO: 0.3 % (ref 0–0.9)
LDLC SERPL CALC-MCNC: 52 MG/DL
LDLC SERPL CALC-MCNC: 53 MG/DL
LYMPHOCYTES # BLD AUTO: 1.91 K/UL (ref 1–4.8)
LYMPHOCYTES NFR BLD: 25.1 % (ref 22–41)
MCH RBC QN AUTO: 21.3 PG (ref 27–33)
MCHC RBC AUTO-ENTMCNC: 29.9 G/DL (ref 32.3–36.5)
MCV RBC AUTO: 71.3 FL (ref 81.4–97.8)
MICROCYTES BLD QL SMEAR: ABNORMAL
MONOCYTES # BLD AUTO: 0.64 K/UL (ref 0–0.85)
MONOCYTES NFR BLD AUTO: 8.4 % (ref 0–13.4)
NEUTROPHILS # BLD AUTO: 4.79 K/UL (ref 1.82–7.42)
NEUTROPHILS NFR BLD: 62.8 % (ref 44–72)
NRBC # BLD AUTO: 0 K/UL
NRBC BLD-RTO: 0 /100 WBC (ref 0–0.2)
PLATELET # BLD AUTO: 278 K/UL (ref 164–446)
PLATELET BLD QL SMEAR: NORMAL
PMV BLD AUTO: 9.4 FL (ref 9–12.9)
POLYCHROMASIA BLD QL SMEAR: NORMAL
POTASSIUM SERPL-SCNC: 4.3 MMOL/L (ref 3.6–5.5)
POTASSIUM SERPL-SCNC: 4.3 MMOL/L (ref 3.6–5.5)
PROT SERPL-MCNC: 7.4 G/DL (ref 6–8.2)
PROT SERPL-MCNC: 7.4 G/DL (ref 6–8.2)
PSA SERPL-MCNC: 3.46 NG/ML (ref 0–4)
RBC # BLD AUTO: 6.47 M/UL (ref 4.7–6.1)
RBC BLD AUTO: PRESENT
RHEUMATOID FACT SER IA-ACNC: <10 IU/ML (ref 0–14)
SODIUM SERPL-SCNC: 138 MMOL/L (ref 135–145)
SODIUM SERPL-SCNC: 139 MMOL/L (ref 135–145)
T PALLIDUM AB SER QL IA: NORMAL
T3 SERPL-MCNC: 86.3 NG/DL (ref 60–181)
T4 SERPL-MCNC: 4.4 UG/DL (ref 4–12)
THYROPEROXIDASE AB SERPL-ACNC: 9 IU/ML (ref 0–9)
TRIGL SERPL-MCNC: 147 MG/DL (ref 0–149)
TRIGL SERPL-MCNC: 149 MG/DL (ref 0–149)
TSH SERPL DL<=0.005 MIU/L-ACNC: 1.66 UIU/ML (ref 0.38–5.33)
VIT B12 SERPL-MCNC: 896 PG/ML (ref 211–911)
WBC # BLD AUTO: 7.6 K/UL (ref 4.8–10.8)

## 2024-08-22 PROCEDURE — 80061 LIPID PANEL: CPT

## 2024-08-22 PROCEDURE — 86431 RHEUMATOID FACTOR QUANT: CPT

## 2024-08-22 PROCEDURE — 84207 ASSAY OF VITAMIN B-6: CPT

## 2024-08-22 PROCEDURE — 86043 ACETYLCHOLN RCPTR MODLG ANTB: CPT

## 2024-08-22 PROCEDURE — 82784 ASSAY IGA/IGD/IGG/IGM EACH: CPT

## 2024-08-22 PROCEDURE — 84165 PROTEIN E-PHORESIS SERUM: CPT

## 2024-08-22 PROCEDURE — 36415 COLL VENOUS BLD VENIPUNCTURE: CPT

## 2024-08-22 PROCEDURE — 84425 ASSAY OF VITAMIN B-1: CPT

## 2024-08-22 PROCEDURE — 82570 ASSAY OF URINE CREATININE: CPT

## 2024-08-22 PROCEDURE — 80053 COMPREHEN METABOLIC PANEL: CPT

## 2024-08-22 PROCEDURE — 84155 ASSAY OF PROTEIN SERUM: CPT

## 2024-08-22 PROCEDURE — 84630 ASSAY OF ZINC: CPT

## 2024-08-22 PROCEDURE — 84436 ASSAY OF TOTAL THYROXINE: CPT

## 2024-08-22 PROCEDURE — 86225 DNA ANTIBODY NATIVE: CPT

## 2024-08-22 PROCEDURE — 82164 ANGIOTENSIN I ENZYME TEST: CPT

## 2024-08-22 PROCEDURE — 87389 HIV-1 AG W/HIV-1&-2 AB AG IA: CPT

## 2024-08-22 PROCEDURE — 83036 HEMOGLOBIN GLYCOSYLATED A1C: CPT

## 2024-08-22 PROCEDURE — 86255 FLUORESCENT ANTIBODY SCREEN: CPT | Mod: 91

## 2024-08-22 PROCEDURE — 85652 RBC SED RATE AUTOMATED: CPT

## 2024-08-22 PROCEDURE — 84446 ASSAY OF VITAMIN E: CPT

## 2024-08-22 PROCEDURE — 86366 MUSCLE-SPECIFIC KINASE ANTB: CPT

## 2024-08-22 PROCEDURE — 86780 TREPONEMA PALLIDUM: CPT

## 2024-08-22 PROCEDURE — 82043 UR ALBUMIN QUANTITATIVE: CPT

## 2024-08-22 PROCEDURE — 86341 ISLET CELL ANTIBODY: CPT

## 2024-08-22 PROCEDURE — 80053 COMPREHEN METABOLIC PANEL: CPT | Mod: 91

## 2024-08-22 PROCEDURE — 82607 VITAMIN B-12: CPT

## 2024-08-22 PROCEDURE — 3079F DIAST BP 80-89 MM HG: CPT | Performed by: FAMILY MEDICINE

## 2024-08-22 PROCEDURE — 3074F SYST BP LT 130 MM HG: CPT | Performed by: FAMILY MEDICINE

## 2024-08-22 PROCEDURE — 82306 VITAMIN D 25 HYDROXY: CPT

## 2024-08-22 PROCEDURE — 99214 OFFICE O/P EST MOD 30 MIN: CPT | Performed by: FAMILY MEDICINE

## 2024-08-22 PROCEDURE — 82550 ASSAY OF CK (CPK): CPT

## 2024-08-22 PROCEDURE — 86041 ACETYLCHOLN RCPTR BNDNG ANTB: CPT

## 2024-08-22 PROCEDURE — 82595 ASSAY OF CRYOGLOBULIN: CPT

## 2024-08-22 PROCEDURE — 93000 ELECTROCARDIOGRAM COMPLETE: CPT | Performed by: FAMILY MEDICINE

## 2024-08-22 PROCEDURE — 86617 LYME DISEASE ANTIBODY: CPT

## 2024-08-22 PROCEDURE — 85025 COMPLETE CBC W/AUTO DIFF WBC: CPT

## 2024-08-22 PROCEDURE — 84153 ASSAY OF PSA TOTAL: CPT

## 2024-08-22 PROCEDURE — 83090 ASSAY OF HOMOCYSTEINE: CPT

## 2024-08-22 PROCEDURE — 86235 NUCLEAR ANTIGEN ANTIBODY: CPT | Mod: 91

## 2024-08-22 PROCEDURE — 86042 ACETYLCHOLN RCPTR BLCKG ANTB: CPT

## 2024-08-22 PROCEDURE — 84480 ASSAY TRIIODOTHYRONINE (T3): CPT

## 2024-08-22 PROCEDURE — 80061 LIPID PANEL: CPT | Mod: 91

## 2024-08-22 PROCEDURE — 86162 COMPLEMENT TOTAL (CH50): CPT

## 2024-08-22 PROCEDURE — 84443 ASSAY THYROID STIM HORMONE: CPT

## 2024-08-22 PROCEDURE — 82085 ASSAY OF ALDOLASE: CPT

## 2024-08-22 PROCEDURE — 83519 RIA NONANTIBODY: CPT | Mod: 91

## 2024-08-22 PROCEDURE — 86334 IMMUNOFIX E-PHORESIS SERUM: CPT

## 2024-08-22 PROCEDURE — 86038 ANTINUCLEAR ANTIBODIES: CPT

## 2024-08-22 PROCEDURE — 86200 CCP ANTIBODY: CPT

## 2024-08-22 PROCEDURE — 82746 ASSAY OF FOLIC ACID SERUM: CPT

## 2024-08-22 PROCEDURE — 86160 COMPLEMENT ANTIGEN: CPT | Mod: 91

## 2024-08-22 PROCEDURE — 86376 MICROSOMAL ANTIBODY EACH: CPT

## 2024-08-22 ASSESSMENT — FIBROSIS 4 INDEX: FIB4 SCORE: 0.99

## 2024-08-22 NOTE — PROGRESS NOTES
PRE-OPERATIVE EXAMINATION      Verbal consent was acquired by the patient to use [employer] ambient listening note generation during this visit     Preston was seen today for medical clearance.    Diagnoses and all orders for this visit:    Pre-op evaluation  -     EKG - Clinic Performed  -     REFERRAL TO CARDIOLOGY  -     EC-ECHOCARDIOGRAM COMPLETE W/O CONT; Future    Degenerative cervical spinal stenosis  -     REFERRAL TO CARDIOLOGY    Type 2 diabetes mellitus with microalbuminuria, without long-term current use of insulin ([facility])      Right bundle branch block  -     REFERRAL TO CARDIOLOGY  -     EC-ECHOCARDIOGRAM COMPLETE W/O CONT; Future                  Assessment & Plan  1. Preop eval and Degenerative cervical spinal stenosis.  He is diagnosed with degenerative cervical spinal stenosis, a chronic and unstable condition. Imaging shows significant arthritis at the cervical spine with disc herniation causing marked compression on the spinal cord. Symptoms include numbness and tingling in the fingers and an unsteady gait, indicative of early cervical myelopathy.  He is scheduled for surgery, which is expected to last approximately 2 to 5 hours. An EKG performed in the office revealed a right bundle branch block. A referral has been made to cardiology for further preoperative evaluation.   Ordered echocardiogram for further evaluation for this.    2. Type 2 diabetes mellitus.  This is a chronic condition that appears stable based on the most recent lab results. The current treatment plan of metformin 1000 mg twice daily and Jardiance 25 mg daily will be continued. Labs will be rechecked to further assess the condition for preoperative evaluation. He will have his A1c checked today along with other blood tests.            Follow up in 3 months for labs.      Chief complaint::Diagnoses of Pre-op evaluation, Degenerative cervical spinal stenosis, Type 2 diabetes mellitus with microalbuminuria, without long-term current  use of insulin (HCC), and Right bundle branch block were pertinent to this visit.      History of Present Illness  The patient is a 69-year-old male who presents for a preoperative evaluation. He is accompanied by an adult female.    He is scheduled for surgery to address spinal stenosis at the C5-C6 level, with the procedure expected to last between 2 to 5 hours. The surgeon has informed him that there may be a need for additional surgery from the posterior aspect the following day. He does not recall when his last EKG was performed. He reports no chest pain, palpitations, or shortness of breath. He uses a CPAP machine for sleep apnea and reports no current issues with snoring.    He is due for a fasting blood test today.    He saw  at [facility] a couple of days ago. They found that one vocal cord was paralyzed on the right side. She sent him for a nerve conduction study and an EMG. They are testing him for motor neuron disease and looking for paraneoplastic syndrome.      Review of systems:    Chest pain:  no  Shortness of breath: no  Shortness of breath with exertion: no  Orthopnea: no  Dizziness: no  Unexplained weight change:no    Other Review of Systems:    PMH:   has a past medical history of Cough, Daytime sleepiness, Diabetes (HCC), [place] measles, Hearing difficulty, Hyperlipidemia, Hypertension, Influenza, Morning headache, Mumps, Snoring, and Wears glasses.    CAD: no  HTN: yes  CKD: no  DM:  yes on insulin: no  History of CVA: no    Current chronic conditions: No problem-specific Assessment & Plan notes found for this encounter.    Surgical and anesthetic history:  has a past surgical history that includes lumbar decompression; laminotomy; and tonsillectomy. Prior surgery without complication, bleeding, reaction to anesthetic, prolonged recovery  Habits:   Social History     Tobacco Use    Smoking status: Former     Current packs/day: 0.00     Average packs/day: 0.3 packs/day for 50.0 years  (15.0 ttl pk-yrs)     Types: Cigarettes     Start date: 1976     Quit date: 10/1/2022     Years since quittin.8    Smokeless tobacco: Never    Tobacco comments:     Have quit and restarted several times.  Most recently about a month ago   Vaping Use    Vaping status: Never Used   Substance Use Topics    Alcohol use: Not Currently     Alcohol/week: 1.2 oz     Types: 2 Cans of beer per week     Comment: Social    Drug use: Never     Allergies: Patient has no known allergies. No known allergy to Anesthetic, or Latex.     Current medicines:   Current Outpatient Medications   Medication Sig Dispense Refill    metFORMIN (GLUCOPHAGE) 500 MG Tab Take 2 Tablets by mouth 2 times a day with meals. 400 Tablet 3    tizanidine (ZANAFLEX) 2 MG tablet Take 1 tablet every 8 hours by oral route as needed for 30 days. 90 Tablet 0    omeprazole (PRILOSEC) 20 MG delayed-release capsule Take 1 capsule by mouth twice a day 30 min before meals 180 Capsule 4    azelastine (ASTELIN) 137 MCG/SPRAY nasal spray Administer 1 Spray into affected nostril(S) 2 times a day. 90 mL 1    amLODIPine (NORVASC) 5 MG Tab Take 1 Tablet by mouth every day. For high blood pressure 100 Tablet 3    metoprolol SR (TOPROL XL) 50 MG TABLET SR 24 HR Take 1 Tablet by mouth every day. For heart and blood pressure 100 Tablet 3    amLODIPine (NORVASC) 2.5 MG Tab Take 1 Tablet by mouth every day. Along with amlodipine 5 mg for today of 7.5 mg/day for blood pressure 100 Tablet 3    sertraline (ZOLOFT) 50 MG Tab Take 1 Tablet by mouth every day. For anxiety 100 Tablet 3    Empagliflozin (JARDIANCE) 25 MG Tab Take 1 Tablet by mouth every day. 100 Tablet 3    valsartan (DIOVAN) 320 MG tablet Take 1 Tablet by mouth every day. 100 Tablet 3    Omega-3 Fatty Acids (FISH OIL) 1000 MG Cap capsule Take 1 Capsule by mouth 3 times a day with meals.      Zinc Sulfate (ZINC-220 PO) Take  by mouth.      Ascorbic Acid (VITAMIN C CR) 1000 MG Tab CR Take  by mouth.      vitamin D3  (CHOLECALCIFEROL) 1000 Unit (25 mcg) Tab Take 1 Tablet by mouth every day.      glucosamine Sulfate 500 MG Cap Take 1 Capsule by mouth 3 times a day with meals.      ASPIRIN LOW DOSE PO Take 5 mg by mouth.      Methylsulfonylmethane (MSM) 1000 MG Cap Take 1 Capsule by mouth.      Multiple Vitamin (MULTI-VITAMIN DAILY PO) Take  by mouth.       No current facility-administered medications for this visit.         Social History     Tobacco Use    Smoking status: Former     Current packs/day: 0.00     Average packs/day: 0.3 packs/day for 50.0 years (15.0 ttl pk-yrs)     Types: Cigarettes     Start date: 1976     Quit date: 10/1/2022     Years since quittin.8    Smokeless tobacco: Never    Tobacco comments:     Have quit and restarted several times.  Most recently about a month ago   Vaping Use    Vaping status: Never Used   Substance Use Topics    Alcohol use: Not Currently     Alcohol/week: 1.2 oz     Types: 2 Cans of beer per week     Comment: Social    Drug use: Never        Vitals:    24 1030   BP: 124/82   Pulse: 85   Resp: 12   Temp: 36.4 °C (97.5 °F)   SpO2: 95%        Physical Exam  Constitutional:       Appearance: Normal appearance. He is well-developed and well-groomed.   HENT:      Head: Normocephalic and atraumatic.   Eyes:      General:         Right eye: No discharge.         Left eye: No discharge.      Conjunctiva/sclera: Conjunctivae normal.   Cardiovascular:      Rate and Rhythm: Normal rate and regular rhythm.      Heart sounds: Normal heart sounds. No murmur heard.     No friction rub. No gallop.   Pulmonary:      Effort: Pulmonary effort is normal. No respiratory distress.      Breath sounds: Normal breath sounds. No wheezing or rales.   Neurological:      General: No focal deficit present.      Mental Status: He is alert. Mental status is at baseline.      Gait: Gait is intact.   Psychiatric:         Mood and Affect: Mood and affect normal.         Behavior: Behavior normal.             Revised Cardiac Risk Index (RCRI) for Pre-Operative Risk   (estimates risk of cardiac complications after noncardiac surgery)    High-risk surgery: No 0 / Yes +1  Intraperitoneal, intrathoracic, suprainguinal vascular  History of ischemic heart disease: No 0 / Yes +1  Hx of MI, (+) exercise test, current chest pain considered due to myocardial ischemia, use of nitrate therapy or ECG with pathological Q waves)  History of CHF: No 0 / Yes +1  Pulmonary edema, B/L rales or S3 gallop; PAGE, orthopnea, PND, CXR showing pulmonary vascular redistribution)  History of cerebrovascular disease: No 0 / Yes +1  Prior TIA or stroke  Pre-operative treatment with insulin: No 0 / Yes +1  Pre-operative creatinine >2 mg/dL: No 0 / Yes +1    RCRI Scorin points: Class I Risk, 3.9% 30-day risk of death, MI, or cardiac arrest  1 point: Class II Risk, 6.0% 30-day risk of death, MI, or cardiac arrest  2 points: Class III Risk, 10.1% 30-day risk of death, MI, or cardiac arrest  3 points: Class IV Risk, 15% 30-day risk of death, MI, or cardiac arrest  4 points: Class IV Risk, 15% 30-day risk of death, MI, or cardiac arrest  5 points: Class IV Risk, 15% 30-day risk of death, MI, or cardiac arrest  6 points: Class IV Risk, 15% 30-day risk of death, MI, or cardiac arrest    Lab Results   Component Value Date/Time    CREATININE 0.98 05/15/2024 10:38 AM           Please note that this dictation was created using voice recognition software. I have made every reasonable attempt to correct obvious errors, but I expect that there are errors of grammar and possibly content that I did not discover before finalizing the note.

## 2024-08-23 LAB
CREAT UR-MCNC: 125.87 MG/DL
MICROALBUMIN UR-MCNC: 68.6 MG/DL
MICROALBUMIN/CREAT UR: 545 MG/G (ref 0–30)

## 2024-08-24 LAB
ACE SERPL-CCNC: 20 U/L (ref 16–85)
ACHR BIND AB SER-SCNC: 0 NMOL/L (ref 0–0.4)
ALDOLASE SERPL-CCNC: 3.1 U/L (ref 1.2–7.6)
CCP IGA+IGG SERPL IA-ACNC: 4 UNITS (ref 0–19)
CH50 SERPL-ACNC: 75.7 U/ML (ref 38.7–89.9)
DSDNA AB TITR SER CLIF: NORMAL {TITER}
NUCLEAR IGG SER QL IA: NORMAL
STRIA MUS IGG SER QL IF: NORMAL

## 2024-08-25 LAB
ACHR BLOCK AB/ACHR TOTAL SFR SER: 13 % (ref 0–26)
ACHR MOD AB/ACHR TOTAL SFR SER: 4 %
ENA JO1 AB TITR SER: 0 AU/ML (ref 0–40)
ENA SS-A 60KD AB SER-ACNC: 0 AU/ML (ref 0–40)
ENA SS-A IGG SER QL: 1 AU/ML (ref 0–40)
ENA SS-B IGG SER IA-ACNC: 0 AU/ML (ref 0–40)
GAD65 AB SER IA-ACNC: <5 IU/ML (ref 0–5)
VIT B6 SERPL-MCNC: 83.6 NMOL/L (ref 20–125)
ZINC SERPL-MCNC: 114.2 UG/DL (ref 60–120)

## 2024-08-26 LAB
A-TOCOPHEROL VIT E SERPL-MCNC: 9.7 MG/L (ref 5.5–18)
ALBUMIN SERPL ELPH-MCNC: 4.07 G/DL (ref 3.75–5.01)
ALPHA1 GLOB SERPL ELPH-MCNC: 0.27 G/DL (ref 0.19–0.46)
ALPHA2 GLOB SERPL ELPH-MCNC: 1.04 G/DL (ref 0.48–1.05)
B BURGDOR IGG SER QL IB: NEGATIVE
B BURGDOR IGM SER QL IB: NORMAL
B-GLOBULIN SERPL ELPH-MCNC: 0.94 G/DL (ref 0.48–1.1)
BETA+GAMMA TOCOPHEROL SERPL-MCNC: 0.7 MG/L (ref 0–6)
GAMMA GLOB SERPL ELPH-MCNC: 0.89 G/DL (ref 0.62–1.51)
IGA SERPL-MCNC: 206 MG/DL (ref 68–408)
IGG SERPL-MCNC: 643 MG/DL (ref 768–1632)
IGM SERPL-MCNC: 466 MG/DL (ref 35–263)
INTERPRETATION SERPL IFE-IMP: ABNORMAL
MONOCLON BAND OBS SERPL: ABNORMAL
MONOCLONAL PROTEIN NL11656: 0.35 G/DL
MUSK AB SER QL: NORMAL
PATHOLOGY STUDY: ABNORMAL
PROT SERPL-MCNC: 7.2 G/DL (ref 6.3–8.2)

## 2024-08-27 ENCOUNTER — PATIENT MESSAGE (OUTPATIENT)
Dept: MEDICAL GROUP | Facility: MEDICAL CENTER | Age: 69
End: 2024-08-27
Payer: MEDICARE

## 2024-08-27 LAB
CRYOGLOB SER QL 3D COLD INC: NORMAL
VIT B1 BLD-MCNC: 240 NMOL/L (ref 70–180)

## 2024-08-28 ENCOUNTER — HOSPITAL ENCOUNTER (OUTPATIENT)
Dept: RADIOLOGY | Facility: MEDICAL CENTER | Age: 69
End: 2024-08-28
Payer: MEDICARE

## 2024-08-28 ENCOUNTER — HOSPITAL ENCOUNTER (OUTPATIENT)
Facility: MEDICAL CENTER | Age: 69
End: 2024-08-28
Payer: MEDICARE

## 2024-08-28 DIAGNOSIS — R56.9 GENERALIZED CONVULSIVE SEIZURE (HCC): ICD-10-CM

## 2024-08-28 DIAGNOSIS — G12.21 AMYOTROPHIC LATERAL SCLEROSIS (HCC): ICD-10-CM

## 2024-08-28 DIAGNOSIS — G04.90 INFLAMMATORY DISEASE OF THE CENTRAL NERVOUS SYSTEM: ICD-10-CM

## 2024-08-28 PROCEDURE — 82300 ASSAY OF CADMIUM: CPT

## 2024-08-28 PROCEDURE — A9579 GAD-BASE MR CONTRAST NOS,1ML: HCPCS | Mod: JZ

## 2024-08-28 PROCEDURE — 86480 TB TEST CELL IMMUN MEASURE: CPT

## 2024-08-28 PROCEDURE — 70553 MRI BRAIN STEM W/O & W/DYE: CPT

## 2024-08-28 PROCEDURE — 83655 ASSAY OF LEAD: CPT

## 2024-08-28 PROCEDURE — 700117 HCHG RX CONTRAST REV CODE 255: Mod: JZ

## 2024-08-28 PROCEDURE — 83825 ASSAY OF MERCURY: CPT

## 2024-08-28 PROCEDURE — 82175 ASSAY OF ARSENIC: CPT

## 2024-08-28 RX ADMIN — GADOTERIDOL 20 ML: 279.3 INJECTION, SOLUTION INTRAVENOUS at 18:35

## 2024-08-29 LAB
ARSENIC BLD-MCNC: <10 UG/L
CADMIUM BLD-MCNC: <1 UG/L
LEAD BLDV-MCNC: <2 UG/DL
MERCURY BLD-MCNC: <2.5 UG/L

## 2024-08-30 LAB
GAMMA INTERFERON BACKGROUND BLD IA-ACNC: 0.02 IU/ML
M TB IFN-G BLD-IMP: NEGATIVE
M TB IFN-G CD4+ BCKGRND COR BLD-ACNC: 0 IU/ML
MITOGEN IGNF BCKGRD COR BLD-ACNC: 5.28 IU/ML
QFT TB2 - NIL TBQ2: 0 IU/ML

## 2024-09-05 ENCOUNTER — OFFICE VISIT (OUTPATIENT)
Dept: DERMATOLOGY | Facility: IMAGING CENTER | Age: 69
End: 2024-09-05
Payer: MEDICARE

## 2024-09-05 DIAGNOSIS — D22.9 MULTIPLE NEVI: ICD-10-CM

## 2024-09-05 DIAGNOSIS — Z12.83 SKIN CANCER SCREENING: ICD-10-CM

## 2024-09-05 DIAGNOSIS — D48.9 NEOPLASM OF UNCERTAIN BEHAVIOR: ICD-10-CM

## 2024-09-05 DIAGNOSIS — L81.4 LENTIGINES: ICD-10-CM

## 2024-09-05 DIAGNOSIS — L73.8 SEBACEOUS HYPERPLASIA: ICD-10-CM

## 2024-09-05 DIAGNOSIS — L72.0 MILIA: ICD-10-CM

## 2024-09-05 DIAGNOSIS — L82.1 SK (SEBORRHEIC KERATOSIS): ICD-10-CM

## 2024-09-05 DIAGNOSIS — D18.01 CHERRY ANGIOMA: ICD-10-CM

## 2024-09-05 DIAGNOSIS — L82.1 STUCCO KERATOSES: ICD-10-CM

## 2024-09-05 DIAGNOSIS — L72.0 EPIDERMAL CYST: ICD-10-CM

## 2024-09-05 NOTE — PROGRESS NOTES
DERMATOLOGY NOTE  FOLLOW UP VISIT       Chief complaint: Follow-Up (PAYTON)    Few new possible AK on forehead and nose  States cyst is bothersome on back     History of skin cancer: Yes, Details: BCC back, chest  and BCC rt ear, 04/2021 MOHS    History of precancers/actinic keratoses: Yes, Details: face chest arms   History of biopsies:Yes, Details: teen  History of blistering/severe sunburns:Yes, Details: lots of sun exporsure   Family history of skin cancer:No  Family history of atypical moles:No    No Known Allergies     MEDICATIONS:  Medications relevant to specialty reviewed.     REVIEW OF SYSTEMS:   Positive for skin (see HPI)  Negative for fevers and chills    EXAM:  There were no vitals taken for this visit.  Constitutional: Well-developed, well-nourished, and in no distress.     A total body skin exam was performed excluding the genitals per patient preference and including the following areas: head (including face), neck, chest, abdomen, groin/buttocks, back, bilateral upper extremities, and bilateral lower extremities with the following pertinent findings listed below and/or in assessment/plan.    -sun exposed skin of trunk and b/l upper, lower extremities and face with scattered clinically benign light brown reticulated macules all of which were morphologically similar and none of which were suspicious for skin cancer today on exam  -Several scattered 1-3mm bright red macules and thin papules on the trunk and extremities  -Several medium and dark brown stuck-on waxy papules scattered on the trunk and extremities  -Multiple tan medium and dark brown skin-colored macules papules scattered over the trunk >> extremities-All with benign-appearing pigment network patterns on dermoscopy  -epidermal cyst Lt mid back--approx. 2.5 to 3 cm L back   -Scattered SH on face   -Stucco Keratosis Bilateral Lower Ext.   Large milia L forehead      IMPRESSION / PLAN:    1. Neoplasm of uncertain behavior  Procedure Note    Procedure: Biopsy by shave technique  Location: L ala  Size: as noted in exam  Preoperative diagnosis:seb hyperplasia vs NMSC vs other  Risks, benefits and alternatives of procedure discussed, verbal consent obtained for photo (see chart) and written informed consent obtained for procedure. Time out completed. Area of biopsy prepped with alcohol. Anesthesia with 1% lidocaine with epinephrine administered with 30 gauge needle. Shave biopsy of the site performed. Hemostasis achieved with pressure and aluminum chloride. Vaseline applied to wound with bandage. Patient tolerated procedure well and there were no complications. The specimen was sent to the pathology lab by the staff. Wound care was discussed.      2. Epidermal cyst  Bothersome, getting bigger, tender at times  Pt would like removed, PA form completed    3. Milia  - Benign-appearing nature of lesions discussed during exam.   - extracted content from milia on L forehead  - Advised to continue to monitor for any return to clinic for new or concerning changes.      4. Lentigines  - Benign-appearing nature of lesions discussed during exam.   - Advised to continue to monitor for any return to clinic for new or concerning changes.      5. Cherry angioma  - Benign-appearing nature of lesions discussed during exam.   - Advised to continue to monitor for any return to clinic for new or concerning changes.      6. SK (seborrheic keratosis)  - Benign-appearing nature of lesions discussed during exam.   - Advised to continue to monitor for any return to clinic for new or concerning changes.      7. Multiple nevi  - Benign-appearing nature of lesions discussed during exam.   - Advised to continue to monitor for any return to clinic for new or concerning changes.  - ABCDE's of melanoma discussed/handout given      8. Sebaceous hyperplasia  - Benign-appearing nature of lesions discussed during exam.   - Advised to continue to monitor for any return to clinic for new or  concerning changes.      9. Stucco keratoses  - Benign-appearing nature of lesions discussed during exam.   - Advised to continue to monitor for any return to clinic for new or concerning changes.          10. Skin cancer screening  Skin cancer education  discussed importance of sun protective clothing, eyewear in addition to the use of broad spectrum sunscreen with SPF 30 or greater, as well as need for reapplication ~every 2 hours when exposed to UVR/handout given  discussed importance following up for any new or changing lesions as noted in handout given, but every 12 months exams in clinic in the setting of dermatologic history  ABCDE's of melanoma discussed/handout given          I have performed a physical exam and reviewed and updated ROS and Plan today (9/5/2024). In review of dermatology visit (9/5/2023), there are no changes except as documented above.     Please note that this dictation was created using voice recognition software. I have made every reasonable attempt to correct obvious errors, but I expect that there are errors of grammar and possibly content that I did not discover before finalizing the note.      Return to clinic in: Return in about 1 year (around 9/5/2025) for PAYTON, Pending Bx results. and as needed for any new or changing skin lesions.

## 2024-09-06 ENCOUNTER — HOSPITAL ENCOUNTER (OUTPATIENT)
Dept: RADIOLOGY | Facility: MEDICAL CENTER | Age: 69
End: 2024-09-06
Attending: NEUROLOGICAL SURGERY
Payer: MEDICARE

## 2024-09-06 DIAGNOSIS — G99.2 MYELOPATHY IN DISEASES CLASSIFIED ELSEWHERE (HCC): ICD-10-CM

## 2024-09-06 PROCEDURE — 70498 CT ANGIOGRAPHY NECK: CPT

## 2024-09-06 PROCEDURE — 700117 HCHG RX CONTRAST REV CODE 255: Performed by: NEUROLOGICAL SURGERY

## 2024-09-06 RX ADMIN — IOHEXOL 100 ML: 350 INJECTION, SOLUTION INTRAVENOUS at 09:29

## 2024-09-11 LAB — TEST NAME 95000: NORMAL

## 2024-09-15 LAB — TEST NAME 95000: NORMAL

## 2024-09-16 ENCOUNTER — OFFICE VISIT (OUTPATIENT)
Dept: CARDIOLOGY | Facility: MEDICAL CENTER | Age: 69
End: 2024-09-16
Attending: FAMILY MEDICINE
Payer: MEDICARE

## 2024-09-16 VITALS
BODY MASS INDEX: 28.95 KG/M2 | SYSTOLIC BLOOD PRESSURE: 122 MMHG | HEART RATE: 77 BPM | WEIGHT: 202.2 LBS | HEIGHT: 70 IN | OXYGEN SATURATION: 95 % | DIASTOLIC BLOOD PRESSURE: 72 MMHG | RESPIRATION RATE: 14 BRPM

## 2024-09-16 DIAGNOSIS — I77.810 ASCENDING AORTA DILATATION (HCC): ICD-10-CM

## 2024-09-16 DIAGNOSIS — Z01.810 PREOP CARDIOVASCULAR EXAM: ICD-10-CM

## 2024-09-16 DIAGNOSIS — R94.31 ABNORMAL EKG: ICD-10-CM

## 2024-09-16 DIAGNOSIS — I10 ESSENTIAL HYPERTENSION: ICD-10-CM

## 2024-09-16 LAB — EKG IMPRESSION: NORMAL

## 2024-09-16 PROCEDURE — 99244 OFF/OP CNSLTJ NEW/EST MOD 40: CPT | Performed by: STUDENT IN AN ORGANIZED HEALTH CARE EDUCATION/TRAINING PROGRAM

## 2024-09-16 PROCEDURE — 99213 OFFICE O/P EST LOW 20 MIN: CPT | Performed by: STUDENT IN AN ORGANIZED HEALTH CARE EDUCATION/TRAINING PROGRAM

## 2024-09-16 PROCEDURE — 3078F DIAST BP <80 MM HG: CPT | Performed by: STUDENT IN AN ORGANIZED HEALTH CARE EDUCATION/TRAINING PROGRAM

## 2024-09-16 PROCEDURE — 93010 ELECTROCARDIOGRAM REPORT: CPT | Performed by: STUDENT IN AN ORGANIZED HEALTH CARE EDUCATION/TRAINING PROGRAM

## 2024-09-16 PROCEDURE — 93005 ELECTROCARDIOGRAM TRACING: CPT | Performed by: STUDENT IN AN ORGANIZED HEALTH CARE EDUCATION/TRAINING PROGRAM

## 2024-09-16 PROCEDURE — 3074F SYST BP LT 130 MM HG: CPT | Performed by: STUDENT IN AN ORGANIZED HEALTH CARE EDUCATION/TRAINING PROGRAM

## 2024-09-16 ASSESSMENT — ENCOUNTER SYMPTOMS
VOMITING: 0
SHORTNESS OF BREATH: 0
WHEEZING: 0
FEVER: 0
NIGHT SWEATS: 0
ORTHOPNEA: 0
FOCAL WEAKNESS: 0
WEAKNESS: 0
ABDOMINAL PAIN: 0
DIARRHEA: 0
NEAR-SYNCOPE: 0
COUGH: 0
DIZZINESS: 0
PALPITATIONS: 0
SYNCOPE: 0
IRREGULAR HEARTBEAT: 0
DYSPNEA ON EXERTION: 0
NAUSEA: 0
PND: 0

## 2024-09-16 ASSESSMENT — FIBROSIS 4 INDEX: FIB4 SCORE: 1.02

## 2024-09-16 NOTE — PROGRESS NOTES
Cardiology Initial Consultation Note    Date of note:    9/16/2024    Primary Care Provider: Ryan Carranza M.D.  Referring Provider: Ryan Carranza M.D.     Patient Name: Raf Spear   YOB: 1955  MRN:              [id number]    Chief Complaint: Preop cardiovascular exam    History of Present Illness: Mr. Raf Spear is a 69 y.o. male whose current medical problems include hypertension, obesity, diabetes, ascending aorta dilation, and ANNE on CPAP who is here for cardiac consultation for preop cardiovascular exam.    The patient presents today to establish care.  The patient presents with his wife.  The patient reports feeling well today.  The patient reports that he was asked to be evaluated by cardiology prior to spinal surgery.  The patient reports feeling very well today.  He denies any chest pain or shortness of breath on exertion.  The patient reports that he can go up 2 flights of stairs without any chest pain or shortness of breath.  He denies any orthopnea, PND, or leg swelling.  No palpitations.  No syncope or presyncopal episodes.      Cardiovascular Risk Factors:  1. Smoking status: Former smoker  2. Type II Diabetes Mellitus: On medication  Lab Results   Component Value Date/Time    HBA1C 6.4 (H) 08/22/2024 11:30 AM    HBA1C 6.6 (H) 05/15/2024 10:38 AM     3. Hypertension: On medication  4. Dyslipidemia: None  Cholesterol,Tot   Date Value Ref Range Status   08/22/2024 110 100 - 199 mg/dL Final     LDL   Date Value Ref Range Status   08/22/2024 52 <100 mg/dL Final     HDL   Date Value Ref Range Status   08/22/2024 28 (A) >=40 mg/dL Final     Triglycerides   Date Value Ref Range Status   08/22/2024 149 0 - 149 mg/dL Final     5. Family history of early Coronary Artery Disease in a first degree relative (Male less than 55 years of age; Female less than 65 years of age): None  6.  Obesity and/or Metabolic Syndrome: Body mass index is 29.01 kg/m².  7. Sedentary lifestyle:  Not sedentary    Review of Systems   Constitutional: Negative for fever, malaise/fatigue and night sweats.   Cardiovascular:  Negative for chest pain, dyspnea on exertion, irregular heartbeat, leg swelling, near-syncope, orthopnea, palpitations, paroxysmal nocturnal dyspnea and syncope.   Respiratory:  Negative for cough, shortness of breath and wheezing.    Gastrointestinal:  Negative for abdominal pain, diarrhea, nausea and vomiting.   Neurological:  Negative for dizziness, focal weakness and weakness.       All other systems reviewed and are negative.       Current Outpatient Medications   Medication Sig Dispense Refill    metFORMIN (GLUCOPHAGE) 500 MG Tab Take 2 Tablets by mouth 2 times a day with meals. 400 Tablet 3    tizanidine (ZANAFLEX) 2 MG tablet Take 1 tablet every 8 hours by oral route as needed for 30 days. 90 Tablet 0    omeprazole (PRILOSEC) 20 MG delayed-release capsule Take 1 capsule by mouth twice a day 30 min before meals 180 Capsule 4    azelastine (ASTELIN) 137 MCG/SPRAY nasal spray Administer 1 Spray into affected nostril(S) 2 times a day. 90 mL 1    amLODIPine (NORVASC) 5 MG Tab Take 1 Tablet by mouth every day. For high blood pressure 100 Tablet 3    metoprolol SR (TOPROL XL) 50 MG TABLET SR 24 HR Take 1 Tablet by mouth every day. For heart and blood pressure 100 Tablet 3    amLODIPine (NORVASC) 2.5 MG Tab Take 1 Tablet by mouth every day. Along with amlodipine 5 mg for today of 7.5 mg/day for blood pressure 100 Tablet 3    sertraline (ZOLOFT) 50 MG Tab Take 1 Tablet by mouth every day. For anxiety 100 Tablet 3    Empagliflozin (JARDIANCE) 25 MG Tab Take 1 Tablet by mouth every day. 100 Tablet 3    valsartan (DIOVAN) 320 MG tablet Take 1 Tablet by mouth every day. 100 Tablet 3    Omega-3 Fatty Acids (FISH OIL) 1000 MG Cap capsule Take 1 Capsule by mouth 3 times a day with meals.      Ascorbic Acid (VITAMIN C CR) 1000 MG Tab CR Take  by mouth.      vitamin D3 (CHOLECALCIFEROL) 1000 Unit (25  "mcg) Tab Take 1 Tablet by mouth every day.      glucosamine Sulfate 500 MG Cap Take 1 Capsule by mouth 3 times a day with meals.      ASPIRIN LOW DOSE PO Take 5 mg by mouth.      Methylsulfonylmethane (MSM) 1000 MG Cap Take 1 Capsule by mouth.      Multiple Vitamin (MULTI-VITAMIN DAILY PO) Take  by mouth.       No current facility-administered medications for this visit.         No Known Allergies      Physical Exam:  Ambulatory Vitals  /72 (BP Location: Left arm, Patient Position: Sitting, BP Cuff Size: Adult)   Pulse 77   Resp 14   Ht 1.778 m (5' 10\")   Wt 91.7 kg (202 lb 3.2 oz)   SpO2 95%    Oxygen Therapy:  Pulse Oximetry: 95 %  BP Readings from Last 4 Encounters:   09/16/24 122/72   08/22/24 124/82   05/22/24 128/60   03/21/24 130/62       Weight/BMI: Body mass index is 29.01 kg/m².  Wt Readings from Last 4 Encounters:   09/16/24 91.7 kg (202 lb 3.2 oz)   08/22/24 89 kg (196 lb 3.4 oz)   05/22/24 86.6 kg (191 lb)   03/21/24 87 kg (191 lb 12.8 oz)         General: Well appearing and in no apparent distress  Eyes: nl conjunctiva, no icteric sclera  ENT: normal external appearance of ears, nose, and throat  Neck: no visible JVP,  no carotid bruits  Lungs: normal respiratory effort, CTAB  Heart: RRR, no murmurs, no rubs or gallops,  no edema bilateral lower extremities. No LV/RV heave on cardiac palpatation. + bilateral radial pulses.  + bilateral dp pulses.   Abdomen: soft, non tender, non distended, no masses, normal bowel sounds.  No HSM.  Extremities/MSK: no clubbing, no cyanosis  Neurological: No focal sensory deficits  Psychiatric: Appropriate affect, A/O x 3, intact judgement and insight  Skin: Warm extremities      Lab Data Review:  Lab Results   Component Value Date/Time    CHOLSTRLTOT 110 08/22/2024 12:00 PM    LDL 52 08/22/2024 12:00 PM    HDL 28 (A) 08/22/2024 12:00 PM    TRIGLYCERIDE 149 08/22/2024 12:00 PM       Lab Results   Component Value Date/Time    SODIUM 138 08/22/2024 12:00 PM    " POTASSIUM 4.3 08/22/2024 12:00 PM    CHLORIDE 103 08/22/2024 12:00 PM    CO2 22 08/22/2024 12:00 PM    GLUCOSE 104 (H) 08/22/2024 12:00 PM    BUN 18 08/22/2024 12:00 PM    CREATININE 0.92 08/22/2024 12:00 PM     Lab Results   Component Value Date/Time    ALKPHOSPHAT 79 08/22/2024 12:00 PM    ASTSGOT 26 08/22/2024 12:00 PM    ALTSGPT 42 08/22/2024 12:00 PM    TBILIRUBIN 0.4 08/22/2024 12:00 PM      Lab Results   Component Value Date/Time    WBC 7.6 08/22/2024 12:00 PM    HEMOGLOBIN 13.8 (L) 08/22/2024 12:00 PM     Lab Results   Component Value Date/Time    HBA1C 6.4 (H) 08/22/2024 11:30 AM    HBA1C 6.6 (H) 05/15/2024 10:38 AM         Cardiac Imaging and Procedures Review:    EKG dated 9/16/2024: My personal interpretation is sinus rhythm, right bundle branch block    No prior echocardiogram on file      Assessment & Plan     1. Essential hypertension  EKG    EC-ECHOCARDIOGRAM COMPLETE W/O CONT      2. Abnormal EKG  EC-ECHOCARDIOGRAM COMPLETE W/O CONT      3. Preop cardiovascular exam  EKG    EC-ECHOCARDIOGRAM COMPLETE W/O CONT      4. Ascending aorta dilatation (HCC)  EC-ECHOCARDIOGRAM COMPLETE W/O CONT            Shared Medical Decision Making:    Preop cardiovascular exam  Abnormal EKG  For spinal surgery for spinal stenosis.  Patient is asymptomatic.  He can go up 2 flights stairs without any chest pain or shortness of breath.  -Will plan for echocardiogram to assess LVEF and any structural abnormalities given abnormal EKG  -Will send preop note to Dr. Ryan Carranza and Dr. Migdalia Merritt after echocardiogram    Hypertension  BP controlled this visit  -Continue amlodipine, metoprolol, and valsartan    Ascending aorta dilatation  Chart diagnosis.  CTA 3/2024 shows ascending aorta measuring 3.8cm.  -Obtain echocardiogram as above to assess LVEF and any valvular abnormalities  -Manage BP as above    All of the patient's excellent questions were answered to the best of my knowledge and to his satisfaction.  It was a  pleasure seeing Mr. Raf Spear in my clinic today. Return in about 1 year (around 9/16/2025), or if symptoms worsen or fail to improve. Patient is aware to call the cardiology clinic with any questions or concerns.      Lake Yañez MD  [facility] [address]  [address] [address]  Phone: [phone]  Fax: [phone]

## 2024-09-17 ENCOUNTER — HOSPITAL ENCOUNTER (OUTPATIENT)
Dept: CARDIOLOGY | Facility: MEDICAL CENTER | Age: 69
End: 2024-09-17
Attending: STUDENT IN AN ORGANIZED HEALTH CARE EDUCATION/TRAINING PROGRAM
Payer: MEDICARE

## 2024-09-17 ENCOUNTER — TELEPHONE (OUTPATIENT)
Dept: MEDICAL GROUP | Facility: MEDICAL CENTER | Age: 69
End: 2024-09-17

## 2024-09-17 DIAGNOSIS — R94.31 ABNORMAL EKG: ICD-10-CM

## 2024-09-17 DIAGNOSIS — Z01.810 PREOP CARDIOVASCULAR EXAM: ICD-10-CM

## 2024-09-17 DIAGNOSIS — I10 ESSENTIAL HYPERTENSION: ICD-10-CM

## 2024-09-17 DIAGNOSIS — I77.810 ASCENDING AORTA DILATATION (HCC): ICD-10-CM

## 2024-09-17 LAB
LV EJECT FRACT MOD 2C 99903: 54.52
LV EJECT FRACT MOD 4C 99902: 62.76
LV EJECT FRACT MOD BP 99901: 58.62

## 2024-09-17 PROCEDURE — 93306 TTE W/DOPPLER COMPLETE: CPT

## 2024-09-17 PROCEDURE — 93306 TTE W/DOPPLER COMPLETE: CPT | Mod: 26 | Performed by: STUDENT IN AN ORGANIZED HEALTH CARE EDUCATION/TRAINING PROGRAM

## 2024-09-17 NOTE — TELEPHONE ENCOUNTER
Called and left a voicemail for the patient to call back regarding his [employer] message about his echocardiogram referral.

## 2024-09-26 ENCOUNTER — TELEPHONE (OUTPATIENT)
Dept: DERMATOLOGY | Facility: IMAGING CENTER | Age: 69
End: 2024-09-26
Payer: MEDICARE

## 2024-10-02 ENCOUNTER — TELEPHONE (OUTPATIENT)
Dept: MEDICAL GROUP | Facility: MEDICAL CENTER | Age: 69
End: 2024-10-02
Payer: MEDICARE

## 2024-10-03 ENCOUNTER — OFFICE VISIT (OUTPATIENT)
Dept: DERMATOLOGY | Facility: IMAGING CENTER | Age: 69
End: 2024-10-03
Payer: MEDICARE

## 2024-10-03 DIAGNOSIS — C82.60 CUTANEOUS FOLLICLE CENTER LYMPHOMA, UNSPECIFIED BODY REGION (HCC): ICD-10-CM

## 2024-10-03 PROCEDURE — 99213 OFFICE O/P EST LOW 20 MIN: CPT | Performed by: NURSE PRACTITIONER

## 2024-10-07 ENCOUNTER — HOSPITAL ENCOUNTER (OUTPATIENT)
Dept: RADIOLOGY | Facility: MEDICAL CENTER | Age: 69
End: 2024-10-07
Payer: MEDICARE

## 2024-10-07 DIAGNOSIS — C82.60 CUTANEOUS FOLLICLE CENTER LYMPHOMA, UNSPECIFIED BODY REGION (HCC): ICD-10-CM

## 2024-10-10 ENCOUNTER — OFFICE VISIT (OUTPATIENT)
Dept: DERMATOLOGY | Facility: IMAGING CENTER | Age: 69
End: 2024-10-10
Payer: MEDICARE

## 2024-10-10 DIAGNOSIS — L72.0 EPIDERMAL CYST: ICD-10-CM

## 2024-10-10 DIAGNOSIS — R20.8 BURNING SENSATION: ICD-10-CM

## 2024-10-10 PROCEDURE — 12034 INTMD RPR S/TR/EXT 7.6-12.5: CPT | Performed by: DERMATOLOGY

## 2024-10-10 PROCEDURE — 11404 EXC TR-EXT B9+MARG 3.1-4 CM: CPT | Performed by: DERMATOLOGY

## 2024-10-10 RX ORDER — DOXYCYCLINE HYCLATE 100 MG
TABLET ORAL
Qty: 20 TABLET | Refills: 0 | Status: SHIPPED | OUTPATIENT
Start: 2024-10-10

## 2024-10-16 ENCOUNTER — TELEPHONE (OUTPATIENT)
Dept: MEDICAL GROUP | Facility: MEDICAL CENTER | Age: 69
End: 2024-10-16
Payer: MEDICARE

## 2024-10-16 NOTE — TELEPHONE ENCOUNTER
Called [facility], left a message with front office staff. They will have Ana Lilia call back.

## 2024-10-16 NOTE — TELEPHONE ENCOUNTER
Ana Lilia from [facility] called 10/16/2024 for clarification on Preston Campa surgical clearance. She stated that you had advised Preston to resume his Aspirin 48 hours after surgery, however she said that the surgery could not be done unless he is able to resume his Aspirin 8 to 12 weeks after surgery. Please advise.   spouse

## 2024-10-21 ENCOUNTER — NON-PROVIDER VISIT (OUTPATIENT)
Dept: DERMATOLOGY | Facility: IMAGING CENTER | Age: 69
End: 2024-10-21
Payer: MEDICARE

## 2024-10-21 ENCOUNTER — PHARMACY VISIT (OUTPATIENT)
Dept: PHARMACY | Facility: MEDICAL CENTER | Age: 69
End: 2024-10-21
Payer: COMMERCIAL

## 2024-10-21 DIAGNOSIS — R80.9 TYPE 2 DIABETES MELLITUS WITH MICROALBUMINURIA, WITHOUT LONG-TERM CURRENT USE OF INSULIN (HCC): ICD-10-CM

## 2024-10-21 DIAGNOSIS — E11.29 TYPE 2 DIABETES MELLITUS WITH MICROALBUMINURIA, WITHOUT LONG-TERM CURRENT USE OF INSULIN (HCC): ICD-10-CM

## 2024-10-21 DIAGNOSIS — I10 ESSENTIAL HYPERTENSION: ICD-10-CM

## 2024-10-21 PROCEDURE — RXMED WILLOW AMBULATORY MEDICATION CHARGE: Performed by: FAMILY MEDICINE

## 2024-10-21 RX ORDER — VALSARTAN 320 MG/1
320 TABLET ORAL DAILY
Qty: 100 TABLET | Refills: 3 | Status: SHIPPED | OUTPATIENT
Start: 2024-10-21

## 2024-10-21 RX ORDER — EMPAGLIFLOZIN 25 MG/1
1 TABLET, FILM COATED ORAL DAILY
Qty: 100 TABLET | Refills: 3 | Status: SHIPPED | OUTPATIENT
Start: 2024-10-21

## 2024-10-22 ENCOUNTER — PATIENT MESSAGE (OUTPATIENT)
Dept: MEDICAL GROUP | Facility: MEDICAL CENTER | Age: 69
End: 2024-10-22
Payer: MEDICARE

## 2024-10-22 DIAGNOSIS — D47.2 MGUS (MONOCLONAL GAMMOPATHY OF UNKNOWN SIGNIFICANCE): ICD-10-CM

## 2024-10-22 DIAGNOSIS — D47.2 MONOCLONAL GAMMOPATHY: ICD-10-CM

## 2024-10-22 DIAGNOSIS — C84.A0 CUTANEOUS T-CELL LYMPHOMA, UNSPECIFIED BODY REGION (HCC): ICD-10-CM

## 2024-10-22 PROCEDURE — RXMED WILLOW AMBULATORY MEDICATION CHARGE: Performed by: FAMILY MEDICINE

## 2024-10-23 ENCOUNTER — PHARMACY VISIT (OUTPATIENT)
Dept: PHARMACY | Facility: MEDICAL CENTER | Age: 69
End: 2024-10-23
Payer: COMMERCIAL

## 2024-10-23 PROCEDURE — RXMED WILLOW AMBULATORY MEDICATION CHARGE: Performed by: FAMILY MEDICINE

## 2024-10-28 ENCOUNTER — HOSPITAL ENCOUNTER (OUTPATIENT)
Dept: RADIOLOGY | Facility: MEDICAL CENTER | Age: 69
End: 2024-10-28
Attending: NURSE PRACTITIONER
Payer: MEDICARE

## 2024-10-28 DIAGNOSIS — C82.60 CUTANEOUS FOLLICLE CENTER LYMPHOMA, UNSPECIFIED BODY REGION (HCC): ICD-10-CM

## 2024-10-28 LAB — GLUCOSE BLD-MCNC: 103 MG/DL (ref 65–99)

## 2024-10-28 PROCEDURE — A9552 F18 FDG: HCPCS

## 2024-10-28 ASSESSMENT — LIFESTYLE VARIABLES
TOBACCO_USE: YES
SMOKING_STATUS: YES
SMOKING_YEARS: 50

## 2024-10-31 ENCOUNTER — HOSPITAL ENCOUNTER (OUTPATIENT)
Dept: RADIATION ONCOLOGY | Facility: MEDICAL CENTER | Age: 69
End: 2024-10-31
Attending: RADIOLOGY
Payer: MEDICARE

## 2024-10-31 VITALS
TEMPERATURE: 97.9 F | BODY MASS INDEX: 28.82 KG/M2 | RESPIRATION RATE: 20 BRPM | WEIGHT: 200.84 LBS | SYSTOLIC BLOOD PRESSURE: 144 MMHG | DIASTOLIC BLOOD PRESSURE: 91 MMHG | OXYGEN SATURATION: 97 % | HEART RATE: 69 BPM

## 2024-10-31 DIAGNOSIS — C82.61 CUTANEOUS FOLLICLE CENTER LYMPHOMA INVOLVING LYMPH NODES OF HEAD (HCC): ICD-10-CM

## 2024-10-31 PROBLEM — C82.60 CUTANEOUS FOLLICLE CENTER LYMPHOMA (HCC): Status: ACTIVE | Noted: 2024-10-31

## 2024-10-31 PROCEDURE — 99205 OFFICE O/P NEW HI 60 MIN: CPT | Performed by: RADIOLOGY

## 2024-10-31 PROCEDURE — 99214 OFFICE O/P EST MOD 30 MIN: CPT | Performed by: RADIOLOGY

## 2024-10-31 ASSESSMENT — FIBROSIS 4 INDEX: FIB4 SCORE: 1.02

## 2024-10-31 ASSESSMENT — PAIN SCALES - GENERAL: PAINLEVEL: NO PAIN

## 2024-10-31 NOTE — CONSULTS
RADIATION ONCOLOGY CONSULT    Patient name:  Raf Spear    Primary Physician:  Ryan Carranza M.D. MRN: [id number]  [facility]: [id number]   Referring physician:  Shahrzad Rico A.P.*  : 1955, 69 y.o.     DATE OF SERVICE: 10/31/2024    IDENTIFICATION: A 69 y.o. male with   No matching staging information was found for the patient.        He is here at the kind request of Shahrzad Jett A.P.*        HISTORY OF PRESENT ILLNESS:  Subjective     ***        PROBLEM LIST:  Patient Active Problem List   Diagnosis    Essential hypertension    Type 2 diabetes mellitus with microalbuminuria, without long-term current use of insulin (HCC)    Gastroesophageal reflux disease without esophagitis    CECILIA (generalized anxiety disorder)    Tubular adenoma    Lung nodules    Obstructive sleep apnea syndrome    BCC (basal cell carcinoma of skin)    Calculus of gallbladder without cholecystitis without obstruction    Colon, diverticulosis    Former smoker    Bilateral hearing loss    Microcytic anemia    Fatty liver    Other insomnia    Benign paroxysmal positional vertigo due to bilateral vestibular disorder    Other male erectile dysfunction    Obesity (BMI 30.0-34.9)    Postnasal drip    Ascending aorta dilation (HCC)    Kidney stone    Parotid mass    Cutaneous follicle center lymphoma (HCC)        PAST SURGICAL HISTORY:  Past Surgical History:   Procedure Laterality Date    LAMINOTOMY      LUMBAR DECOMPRESSION         CURRENT MEDICATIONS:  Current Outpatient Medications   Medication Sig Dispense Refill    Empagliflozin (JARDIANCE) 25 MG Tab Take 1 Tablet by mouth every day. 100 Tablet 3    valsartan (DIOVAN) 320 MG tablet Take 1 Tablet by mouth every day. 100 Tablet 3    metFORMIN (GLUCOPHAGE) 500 MG Tab Take 2 Tablets by mouth 2 times a day with meals. 400 Tablet 3    tizanidine (ZANAFLEX) 2 MG tablet Take 1 tablet every 8 hours by oral route as needed for 30 days. 90 Tablet 0    omeprazole (PRILOSEC) 20 MG  delayed-release capsule Take 1 capsule by mouth twice a day 30 min before meals 180 Capsule 4    amLODIPine (NORVASC) 5 MG Tab Take 1 Tablet by mouth every day. For high blood pressure 100 Tablet 3    metoprolol SR (TOPROL XL) 50 MG TABLET SR 24 HR Take 1 Tablet by mouth every day. For heart and blood pressure 100 Tablet 3    amLODIPine (NORVASC) 2.5 MG Tab Take 1 Tablet by mouth every day. Along with amlodipine 5 mg for today of 7.5 mg/day for blood pressure 100 Tablet 3    sertraline (ZOLOFT) 50 MG Tab Take 1 Tablet by mouth every day. For anxiety 100 Tablet 3    Omega-3 Fatty Acids (FISH OIL) 1000 MG Cap capsule Take 1 Capsule by mouth 3 times a day with meals.      Ascorbic Acid (VITAMIN C CR) 1000 MG Tab CR Take  by mouth.      vitamin D3 (CHOLECALCIFEROL) 1000 Unit (25 mcg) Tab Take 1 Tablet by mouth every day.      glucosamine Sulfate 500 MG Cap Take 1 Capsule by mouth 3 times a day with meals.      ASPIRIN LOW DOSE PO Take 5 mg by mouth.      Methylsulfonylmethane (MSM) 1000 MG Cap Take 1 Capsule by mouth.      Multiple Vitamin (MULTI-VITAMIN DAILY PO) Take  by mouth.      doxycycline (VIBRAMYCIN) 100 MG Tab Take 1 pill PO BID X 10 days for signs/symptoms of wound infection (Patient not taking: Reported on 10/31/2024) 20 Tablet 0    azelastine (ASTELIN) 137 MCG/SPRAY nasal spray Administer 1 Spray into affected nostril(S) 2 times a day. (Patient not taking: Reported on 10/31/2024) 90 mL 1     No current facility-administered medications for this encounter.       ALLERGIES:    Patient has no known allergies.    FAMILY HISTORY:    family history includes Cancer in his father and mother; Colon Cancer in his father; Hypertension in his father; Kidney cancer in his sister.    SOCIAL HISTORY:     reports that he has been smoking cigarettes. He started smoking about 48 years ago. He has a 14.6 pack-year smoking history. He has never used smokeless tobacco. He reports that he does not currently use alcohol after a  past usage of about 1.2 oz of alcohol per week. He reports that he does not use drugs. Pt lives in [place] with wife Rosalee. Retired analyst.     REVIEW OF SYSTEMS:    A complete review of systems taken. Pertinent items in HPI. All others negative.    PHYSICAL EXAM:    PERFORMANCE STATUS:      10/31/2024    10:15 AM   ECOG Performance Review   ECOG Performance Status Fully active, able to carry on all pre-disease performance without restriction         10/31/2024    10:15 AM   Karnofsky Score   Karnofsky Score 100     BP (!) 144/91 (BP Location: Left arm, Patient Position: Sitting, BP Cuff Size: Adult)   Pulse 69   Temp 36.6 °C (97.9 °F) (Temporal)   Resp 20   Wt 91.1 kg (200 lb 13.4 oz)   SpO2 97%   BMI 28.82 kg/m²   Physical Exam     LABORATORY DATA:   Lab Results   Component Value Date/Time    WBC 7.6 08/22/2024 12:00 PM    RBC 6.47 (H) 08/22/2024 12:00 PM    HEMOGLOBIN 13.8 (L) 08/22/2024 12:00 PM    HEMATOCRIT 46.1 08/22/2024 12:00 PM    MCV 71.3 (L) 08/22/2024 12:00 PM    MCH 21.3 (L) 08/22/2024 12:00 PM    MCHC 29.9 (L) 08/22/2024 12:00 PM    RDW 45.0 08/22/2024 12:00 PM    PLATELETCT 278 08/22/2024 12:00 PM    MPV 9.4 08/22/2024 12:00 PM    NEUTSPOLYS 62.80 08/22/2024 12:00 PM    LYMPHOCYTES 25.10 08/22/2024 12:00 PM    MONOCYTES 8.40 08/22/2024 12:00 PM    EOSINOPHILS 2.50 08/22/2024 12:00 PM    BASOPHILS 0.90 08/22/2024 12:00 PM    ANISOCYTOSIS 1+ 08/22/2024 12:00 PM      Lab Results   Component Value Date/Time    SODIUM 138 08/22/2024 12:00 PM    POTASSIUM 4.3 08/22/2024 12:00 PM    CHLORIDE 103 08/22/2024 12:00 PM    CO2 22 08/22/2024 12:00 PM    GLUCOSE 104 (H) 08/22/2024 12:00 PM    BUN 18 08/22/2024 12:00 PM    CREATININE 0.92 08/22/2024 12:00 PM           RADIOLOGY DATA:  CT-CTA NECK WITH & W/O-POST PROCESSING    Result Date: 9/6/2024  1. No evidence of flow-limiting stenosis in the cervical carotid or cervical vertebral arteries.    CT-PETCT-WHOLE BODY    Result Date: 10/29/2024  1.   Hypermetabolic RIGHT parotid nodule may indicate benign or malignant process. 2.  Mild uptake corresponding to subcutaneous fluid collection in the LEFT upper back, inflammatory versus postoperative fluid collection. 3.  No evidence of metastatic disease. 4.  Fatty infiltration of liver.      IMPRESSION:    A 69 y.o. with  No matching staging information was found for the patient.        RECOMMENDATIONS:   ***  ***    Thank you for the opportunity to participate in his care.  If any questions or comments, please do not hesitate in calling.    No orders of the defined types were placed in this encounter.           W/O-POST PROCESSING    Result Date: 9/6/2024  1. No evidence of flow-limiting stenosis in the cervical carotid or cervical vertebral arteries.    CT-PETCT-WHOLE BODY    Result Date: 10/29/2024  1.  Hypermetabolic RIGHT parotid nodule may indicate benign or malignant process. 2.  Mild uptake corresponding to subcutaneous fluid collection in the LEFT upper back, inflammatory versus postoperative fluid collection. 3.  No evidence of metastatic disease. 4.  Fatty infiltration of liver.      IMPRESSION:    A 69 y.o. with  Cutaneous follicle center lymphoma (HCC)  Staging form: Hodgkin and Non-Hodgkin Lymphoma, AJCC 8th Edition  - Clinical stage from 11/1/2024: Stage IE (Follicular lymphoma) - Signed by Monica Chauhan M.D. on 11/1/2024  Stage prefix: Initial diagnosis        RECOMMENDATIONS:   I had a long discussion with Mr. Spear about his complex current situation.  I do not think that the neurologic issues that are ongoing are related to his lymphoma.  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  His PET scan is reassuring.  His laboratory workup is also reassuring.  Therefore at this point, while the staging of this is somewhat debated, I am comfortable with considering this relatively early stage nonaggressive disease, stage IE by conventional staging.  Given that he only had a shave biopsy of this area, I do recommend that at some point we will proceed with definitive treatment of this with definitive involved site radiation therapy.  However, this is certainly not urgent.  He does not have any obvious residual gross nodularity at the site on his left nasal ala.  We can either proceed with radiation now or proceed with radiation in a few months after he has had a chance to have his neurologic workup completed.  He is favoring the latter which is certainly reasonable.  Therefore for now, he will continue follow-up with neurology, have his  cervical spine surgery completed, and I will plan to see him back in 3 months for consideration of IS RT at that time.    Thank you for the opportunity to participate in his care.  If any questions or comments, please do not hesitate in calling.    Orders Placed This Encounter    REFERRAL TO ONCOLOGY NURSE NAVIGATOR

## 2024-10-31 NOTE — PROGRESS NOTES
Patient was seen today in clinic with Dr. Chauhan for consult.  Vitals signs and weight were obtained and pain assessment was completed.  Allergies and medications were reviewed with the patient.       Vitals/Pain:  Vitals:    10/31/24 1004   BP: (!) 144/91   BP Location: Left arm   Patient Position: Sitting   BP Cuff Size: Adult   Pulse: 69   Resp: 20   Temp: 36.6 °C (97.9 °F)   TempSrc: Temporal   SpO2: 97%   Weight: 91.1 kg (200 lb 13.4 oz)   Pain Score: No pain        Allergies:   Patient has no known allergies.    Current Medications:  Current Outpatient Medications   Medication Sig Dispense Refill    Empagliflozin (JARDIANCE) 25 MG Tab Take 1 Tablet by mouth every day. 100 Tablet 3    valsartan (DIOVAN) 320 MG tablet Take 1 Tablet by mouth every day. 100 Tablet 3    metFORMIN (GLUCOPHAGE) 500 MG Tab Take 2 Tablets by mouth 2 times a day with meals. 400 Tablet 3    tizanidine (ZANAFLEX) 2 MG tablet Take 1 tablet every 8 hours by oral route as needed for 30 days. 90 Tablet 0    omeprazole (PRILOSEC) 20 MG delayed-release capsule Take 1 capsule by mouth twice a day 30 min before meals 180 Capsule 4    amLODIPine (NORVASC) 5 MG Tab Take 1 Tablet by mouth every day. For high blood pressure 100 Tablet 3    metoprolol SR (TOPROL XL) 50 MG TABLET SR 24 HR Take 1 Tablet by mouth every day. For heart and blood pressure 100 Tablet 3    amLODIPine (NORVASC) 2.5 MG Tab Take 1 Tablet by mouth every day. Along with amlodipine 5 mg for today of 7.5 mg/day for blood pressure 100 Tablet 3    sertraline (ZOLOFT) 50 MG Tab Take 1 Tablet by mouth every day. For anxiety 100 Tablet 3    Omega-3 Fatty Acids (FISH OIL) 1000 MG Cap capsule Take 1 Capsule by mouth 3 times a day with meals.      Ascorbic Acid (VITAMIN C CR) 1000 MG Tab CR Take  by mouth.      vitamin D3 (CHOLECALCIFEROL) 1000 Unit (25 mcg) Tab Take 1 Tablet by mouth every day.      glucosamine Sulfate 500 MG Cap Take 1 Capsule by mouth 3 times a day with meals.       ASPIRIN LOW DOSE PO Take 5 mg by mouth.      Methylsulfonylmethane (MSM) 1000 MG Cap Take 1 Capsule by mouth.      Multiple Vitamin (MULTI-VITAMIN DAILY PO) Take  by mouth.      doxycycline (VIBRAMYCIN) 100 MG Tab Take 1 pill PO BID X 10 days for signs/symptoms of wound infection (Patient not taking: Reported on 10/31/2024) 20 Tablet 0    azelastine (ASTELIN) 137 MCG/SPRAY nasal spray Administer 1 Spray into affected nostril(S) 2 times a day. (Patient not taking: Reported on 10/31/2024) 90 mL 1     No current facility-administered medications for this encounter.         PCP:  Tere Iqbal R.N.

## 2024-11-04 ENCOUNTER — HOSPITAL ENCOUNTER (OUTPATIENT)
Dept: HEMATOLOGY ONCOLOGY | Facility: MEDICAL CENTER | Age: 69
End: 2024-11-04
Attending: NURSE PRACTITIONER
Payer: MEDICARE

## 2024-11-04 ENCOUNTER — PATIENT MESSAGE (OUTPATIENT)
Dept: MEDICAL GROUP | Facility: MEDICAL CENTER | Age: 69
End: 2024-11-04

## 2024-11-04 VITALS
HEART RATE: 70 BPM | RESPIRATION RATE: 15 BRPM | DIASTOLIC BLOOD PRESSURE: 78 MMHG | WEIGHT: 201.83 LBS | SYSTOLIC BLOOD PRESSURE: 132 MMHG | BODY MASS INDEX: 28.89 KG/M2 | TEMPERATURE: 97.1 F | HEIGHT: 70 IN | OXYGEN SATURATION: 96 %

## 2024-11-04 DIAGNOSIS — D47.2 IGM LAMBDA MONOCLONAL GAMMOPATHY: ICD-10-CM

## 2024-11-04 DIAGNOSIS — K11.8 PAROTID MASS: ICD-10-CM

## 2024-11-04 DIAGNOSIS — C82.61 CUTANEOUS FOLLICLE CENTER LYMPHOMA INVOLVING LYMPH NODES OF HEAD (HCC): ICD-10-CM

## 2024-11-04 DIAGNOSIS — R91.1 LUNG NODULE: ICD-10-CM

## 2024-11-04 DIAGNOSIS — R53.83 OTHER FATIGUE: ICD-10-CM

## 2024-11-04 PROBLEM — Z86.0100 HISTORY OF COLONIC POLYPS: Status: ACTIVE | Noted: 2024-11-04

## 2024-11-04 PROCEDURE — 99212 OFFICE O/P EST SF 10 MIN: CPT | Performed by: NURSE PRACTITIONER

## 2024-11-04 PROCEDURE — 99204 OFFICE O/P NEW MOD 45 MIN: CPT | Performed by: NURSE PRACTITIONER

## 2024-11-04 ASSESSMENT — ENCOUNTER SYMPTOMS
FEVER: 0
DIAPHORESIS: 0
CONSTIPATION: 0
WEIGHT LOSS: 0
COUGH: 1
HEADACHES: 1
VOMITING: 0
DIARRHEA: 0
PALPITATIONS: 0
NAUSEA: 0
CHILLS: 0
SHORTNESS OF BREATH: 0
NECK PAIN: 1
TINGLING: 1

## 2024-11-04 ASSESSMENT — PAIN SCALES - GENERAL: PAINLEVEL_OUTOF10: NO PAIN

## 2024-11-04 ASSESSMENT — FIBROSIS 4 INDEX: FIB4 SCORE: 1.02

## 2024-11-04 NOTE — PROGRESS NOTES
Subjective     Preston Spear is a 69 y.o. male who presents with New Patient (IOC/Monoclonal gammopathy/Tere.)          HPI    Patient referred to me, Intake Oncology Coordinator by his PCP Dr. Carranza for MGUS.  Patient is accompanied by his wife and daughter for today's visit.    Patient stated that everything began back in February 2024 when he had experienced shortness of breath and difficulty swallowing.  He presented to the emergency department at that time and underwent imaging at that time.  He was found to have cervical stenosis and a parotid mass.  He was seen by neurosurgery, Dr. Migdalia Merritt for the cervical stenosis as well as ENT, Dr. Bernal for the parotid mass.  He did have an FNA biopsy of the parotid mass on 7/23/2024 which was found to be benign.  According the patient during evaluation with Dr. Bernal, he also was noted to have vocal cord paralysis on the right side.  He is due to follow-up with ENT in February of next year.  Patient did state that he does continue to choke every once in a while but not too significant.    With regards to the cervical stenosis, plan is for patient to undergo corpectomy.  However there have been other medical issues that have been going on and they are holding off on surgery until further evaluation and workup is complete.    Patient with lung nodule that according to the patient and wife have been present for many years.  Last CT scan completed at the chest on 6/25/2024 showed a stable 7 mm nodule.  He is followed by PCP for this.    Patient was seen by dermatology and lesion on the left side of the nose was noted.  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  He subsequently was referred to radiation therapy and surgical oncology, and PET/CT was recently completed on 10/28/2024.  PET/CT did show hypermetabolic right parotid nodule which may be benign or malignant.  This is what was biopsied by ENT back in  July 2024 found to be negative.  There is also some mild uptake corresponding to subcutaneous fluid in the left upper back, and patient mentioned that he recently had cyst removed just last week by dermatology.  PET scan otherwise shows no evidence of metastatic disease.  Patient was seen by radiation oncologist, Dr. Chauhan and there is discussion of radiation therapy to the area of the nose, however he feels it is important to proceed with the surgical procedure planned by neurosurgery as well as further workup for ALS before moving forward with any radiation.  Plan would likely be radiation in approximately 3 months as he has a follow-up with Dr. Chauhan at that time.    Patient also having various neurologic issues being followed up and worked up on.  He is being seen by Dr. Jauregui.  According to the wife he has been tested for motor neuron disease and has had an abnormal EMG as well as an abnormal MRI brain.  There is question of ALS or myasthenia gravis.  Patient also noted to have some peripheral neuropathy and during his workup with the neurologist, an SPEP was completed.    SPEP completed on 8/22/2024 shows a monoclonal spike at 0.35.  Immunofixation is positive for IgM type lambda monoclonal protein.  IgA was within normal limits, IgM was slightly elevated at 466, Ig G was slightly low at 643.  He was very mildly anemic with a hemoglobin of 13.8.  No evidence of kidney dysfunction or hypercalcemia.  I did review these lab results in detail with the patient and his family today.    Clinically patient does note some increased fatigue.  His daughter does state he takes naps daily.  He has a dry chronic cough but no shortness of breath noted.  He does have pain in his neck every once in a while.  He also does complain of neuropathy in his fingers and feet.  He also notices a headache every once a while, however no other significant clinical concerns.    Please see past medical and surgical history below.    Patient  "is a current smoker.  He smokes approximately 4 cigarettes/day.  He also does Nicorette mints.  He has been smoking since 1976.    Patient does have a family history of cancer in his mother who had \"lymphoma leukemia\" and a father who had colon cancer.    No Known Allergies  Current Outpatient Medications on File Prior to Encounter   Medication Sig Dispense Refill    Empagliflozin (JARDIANCE) 25 MG Tab Take 1 Tablet by mouth every day. 100 Tablet 3    valsartan (DIOVAN) 320 MG tablet Take 1 Tablet by mouth every day. 100 Tablet 3    metFORMIN (GLUCOPHAGE) 500 MG Tab Take 2 Tablets by mouth 2 times a day with meals. 400 Tablet 3    tizanidine (ZANAFLEX) 2 MG tablet Take 1 tablet every 8 hours by oral route as needed for 30 days. 90 Tablet 0    omeprazole (PRILOSEC) 20 MG delayed-release capsule Take 1 capsule by mouth twice a day 30 min before meals 180 Capsule 4    amLODIPine (NORVASC) 5 MG Tab Take 1 Tablet by mouth every day. For high blood pressure 100 Tablet 3    metoprolol SR (TOPROL XL) 50 MG TABLET SR 24 HR Take 1 Tablet by mouth every day. For heart and blood pressure 100 Tablet 3    amLODIPine (NORVASC) 2.5 MG Tab Take 1 Tablet by mouth every day. Along with amlodipine 5 mg for today of 7.5 mg/day for blood pressure 100 Tablet 3    sertraline (ZOLOFT) 50 MG Tab Take 1 Tablet by mouth every day. For anxiety 100 Tablet 3    Omega-3 Fatty Acids (FISH OIL) 1000 MG Cap capsule Take 1 Capsule by mouth 3 times a day with meals.      Ascorbic Acid (VITAMIN C CR) 1000 MG Tab CR Take  by mouth.      vitamin D3 (CHOLECALCIFEROL) 1000 Unit (25 mcg) Tab Take 1 Tablet by mouth every day.      glucosamine Sulfate 500 MG Cap Take 1 Capsule by mouth 3 times a day with meals.      ASPIRIN LOW DOSE PO Take 5 mg by mouth.      Methylsulfonylmethane (MSM) 1000 MG Cap Take 1 Capsule by mouth.      Multiple Vitamin (MULTI-VITAMIN DAILY PO) Take  by mouth.      doxycycline (VIBRAMYCIN) 100 MG Tab Take 1 pill PO BID X 10 days for " signs/symptoms of wound infection (Patient not taking: Reported on 11/4/2024) 20 Tablet 0    azelastine (ASTELIN) 137 MCG/SPRAY nasal spray Administer 1 Spray into affected nostril(S) 2 times a day. (Patient not taking: Reported on 11/4/2024) 90 mL 1     No current facility-administered medications on file prior to encounter.     Past Medical History:   Diagnosis Date    Basal cell carcinoma     Cough     Daytime sleepiness     Diabetes (HCC)     [place] measles     Hearing difficulty     Hyperlipidemia     Hypertension     Influenza     Morning headache     Mumps     Snoring     Squamous cell carcinoma in situ (SCCIS) of skin     Wears glasses      Past Surgical History:   Procedure Laterality Date    LAMINOTOMY      LUMBAR DECOMPRESSION       Family History   Problem Relation Age of Onset    Cancer Mother         Lymphoma Leukemia    Hypertension Father     Cancer Father         Colon cancer     Colon Cancer Father     Cancer Sister         Kidney    Kidney cancer Sister         Diabetic, COPD     Social History     Socioeconomic History    Marital status:     Highest education level: Some college, no degree   Tobacco Use    Smoking status: Every Day     Current packs/day: 0.30     Average packs/day: 0.3 packs/day for 48.6 years (14.6 ttl pk-yrs)     Types: Cigarettes     Start date: 4/1/1976    Smokeless tobacco: Never    Tobacco comments:     Have quit and restarted several times.  Smoking about 4 cigarettes per day (11/2024). Also uses nicorette mints.   Vaping Use    Vaping status: Never Used   Substance and Sexual Activity    Alcohol use: Not Currently     Alcohol/week: 1.2 oz     Types: 2 Cans of beer per week     Comment: Social    Drug use: Never   Social History Narrative    Lives in [place] with wife Rosalee. Retired. Worked as an .      Social Drivers of Health     Financial Resource Strain: Low Risk  (4/3/2022)    Overall Financial Resource Strain (CARDIA)     Difficulty of  Paying Living Expenses: Not hard at all   Food Insecurity: No Food Insecurity (10/17/2024)    Hunger Vital Sign     Worried About Running Out of Food in the Last Year: Never true     Ran Out of Food in the Last Year: Never true   Transportation Needs: No Transportation Needs (10/17/2024)    PRAPARE - Transportation     Lack of Transportation (Medical): No     Lack of Transportation (Non-Medical): No   Physical Activity: Sufficiently Active (4/3/2022)    Exercise Vital Sign     Days of Exercise per Week: 5 days     Minutes of Exercise per Session: 40 min   Stress: No Stress Concern Present (4/3/2022)    [employer] - Occupational Stress Questionnaire     Feeling of Stress : Not at all   Social Connections: Moderately Isolated (4/3/2022)    Social Connection and Isolation Panel [NHANES]     Frequency of Communication with Friends and Family: More than three times a week     Frequency of Social Gatherings with Friends and Family: Once a week     Attends Muslim Services: Never     Active Member of Clubs or Organizations: No     Attends Club or Organization Meetings: Never     Marital Status:    Housing Stability: Low Risk  (10/17/2024)    Housing Stability Vital Sign     Unable to Pay for Housing in the Last Year: No     Number of Times Moved in the Last Year: 0     Homeless in the Last Year: No             Review of Systems   Constitutional:  Positive for malaise/fatigue (naps daily). Negative for chills, diaphoresis, fever and weight loss.   Respiratory:  Positive for cough (dry, chronic). Negative for shortness of breath.    Cardiovascular:  Negative for chest pain and palpitations.   Gastrointestinal:  Negative for constipation, diarrhea, nausea and vomiting.   Genitourinary:  Negative for dysuria.   Musculoskeletal:  Positive for neck pain (once in a while).   Neurological:  Positive for tingling (in fingers and feet) and headaches (once in a while).              Objective  "    /78 (BP Location: Right arm, Patient Position: Sitting, BP Cuff Size: Adult)   Pulse 70   Temp 36.2 °C (97.1 °F) (Temporal)   Resp 15   Ht 1.778 m (5' 10\")   Wt 91.6 kg (201 lb 13.3 oz)   SpO2 96%   BMI 28.96 kg/m²      Physical Exam  Vitals reviewed.   Constitutional:       General: He is not in acute distress.     Appearance: Normal appearance. He is not diaphoretic.   HENT:      Head: Normocephalic and atraumatic.   Cardiovascular:      Rate and Rhythm: Normal rate and regular rhythm.      Heart sounds: No murmur heard.     No friction rub. No gallop.   Pulmonary:      Effort: Pulmonary effort is normal. No respiratory distress.      Breath sounds: Normal breath sounds. No wheezing.   Musculoskeletal:         General: No swelling or tenderness. Normal range of motion.   Skin:     General: Skin is warm and dry.   Neurological:      Mental Status: He is alert and oriented to person, place, and time.   Psychiatric:         Mood and Affect: Mood normal.         Behavior: Behavior normal.             IMAGING  CT-PETCT-WHOLE BODY    Result Date: 10/29/2024  10/28/2024 2:50 PM HISTORY/REASON FOR EXAM:  [facility] B-cell lymphoma on skin bx TECHNIQUE/EXAM DESCRIPTION AND NUMBER OF VIEWS: PET body imaging. Initially, 11.55 mCi F-18 FDG was administered intravenously under standardized conditions. Approximately 45 minutes after FDG administration, the patient was placed in the supine position on the PET CT table. Blood glucose level was 103 mg/dL. Low dose spiral CT imaging from the cranial vertex through the feet was performed. Whole body PET imaging was then performed from the cranial vertex through the feet. CT images, PET images, and PET/CT fused images were reviewed on a PACS 3D workstation. The limited non-contrast CT data are used primarily for attenuation correction and anatomic correlation.  Evaluation of solid organs and bowel are especially limited utilizing this technique. COMPARISON: CTA " neck 9/6/2024 FINDINGS: Head and neck: Focal intense increased activity corresponds to nodule at the posterior aspect RIGHT parotid gland, max SUV 13.66. Chest: No abnormal activity in the lungs or mediastinum.  Mild increased metabolic activity corresponds to apparent fluid collection in the subcutaneous soft tissues of the LEFT upper back, max SUV 3.33. Abdomen and pelvis: No abnormal focal FDG activity. Musculoskeletal: No focal abnormal metabolic activity. Lower extremities: Physiologic uptake in the LEFT lower leg. Incidental findings on CT: RIGHT parotid nodule posteriorly measuring 11 mm.  Degenerative change of thoracic spine.  Fatty infiltration of liver.  Gallstone noted.  Nonspecific LEFT adrenal nodule measuring 1.7 cm, without PET correlate.  Small nonobstructing bilateral kidney stones.  Probable LEFT kidney cyst.  Sparse colonic diverticula.     1.  Hypermetabolic RIGHT parotid nodule may indicate benign or malignant process. 2.  Mild uptake corresponding to subcutaneous fluid collection in the LEFT upper back, inflammatory versus postoperative fluid collection. 3.  No evidence of metastatic disease. 4.  Fatty infiltration of liver.       PATHOLOGY  FINAL DIAGNOSIS:   07/23/24  A. Right parotid gland fine needle aspiration:          Adequate for interpretation.          Diagnostic category: Benign / reactive.          Benign epithelial cells and small mature appearing lymphocytes.       LABORATORY RESULTS   Latest Reference Range & Units 08/22/24 12:00   WBC 4.8 - 10.8 K/uL 7.6   RBC 4.70 - 6.10 M/uL 6.47 (H)   Hemoglobin 14.0 - 18.0 g/dL 13.8 (L)   Hematocrit 42.0 - 52.0 % 46.1   MCV 81.4 - 97.8 fL 71.3 (L)   MCH 27.0 - 33.0 pg 21.3 (L)   MCHC 32.3 - 36.5 g/dL 29.9 (L)   RDW 35.9 - 50.0 fL 45.0   Platelet Count 164 - 446 K/uL 278   MPV 9.0 - 12.9 fL 9.4   Neutrophils-Polys 44.00 - 72.00 % 62.80   Neutrophils (Absolute) 1.82 - 7.42 K/uL 4.79   Lymphocytes 22.00 - 41.00 % 25.10   Lymphs (Absolute) 1.00  - 4.80 K/uL 1.91   Monocytes 0.00 - 13.40 % 8.40   Monos (Absolute) 0.00 - 0.85 K/uL 0.64   Eosinophils 0.00 - 6.90 % 2.50   Eos (Absolute) 0.00 - 0.51 K/uL 0.19   Basophils 0.00 - 1.80 % 0.90   Baso (Absolute) 0.00 - 0.12 K/uL 0.07   Immature Granulocytes 0.00 - 0.90 % 0.30   Immature Granulocytes (abs) 0.00 - 0.11 K/uL 0.02   Nucleated RBC 0.00 - 0.20 /100 WBC 0.00   NRBC (Absolute) K/uL 0.00   Plt Estimation  Normal   RBC Morphology  Present   Anisocytosis  1+   Microcytosis  1+   Polychromia  1+   Sed Rate Westergren 0 - 20 mm/hour 3   Sodium 135 - 145 mmol/L 138   Potassium 3.6 - 5.5 mmol/L 4.3   Chloride 96 - 112 mmol/L 103   Co2 20 - 33 mmol/L 22   Anion Gap 7.0 - 16.0  13.0   Glucose 65 - 99 mg/dL 104 (H)   Bun 8 - 22 mg/dL 18   Creatinine 0.50 - 1.40 mg/dL 0.92   GFR (CKD-EPI) >60 mL/min/1.73 m 2 89   Calcium 8.4 - 10.2 mg/dL 9.2   Correct Calcium 8.5 - 10.5 mg/dL 9.0   AST(SGOT) 12 - 45 U/L 26   ALT(SGPT) 2 - 50 U/L 42   Alkaline Phosphatase 30 - 99 U/L 79   Total Bilirubin 0.1 - 1.5 mg/dL 0.4   Albumin 3.2 - 4.9 g/dL 4.3   Total Protein 6.0 - 8.2 g/dL 7.4   Globulin 1.9 - 3.5 g/dL 3.1   A-G Ratio g/dL 1.4      Latest Reference Range & Units 08/22/24 12:00   Albumin 3.75 - 5.01 g/dL 4.07   Gamma Globulin 0.62 - 1.51 g/dL 0.89   Alpha-1 Globulin 0.19 - 0.46 g/dL 0.27   Alpha-2 Globulin 0.48 - 1.05 g/dL 1.04   Beta Globulin 0.48 - 1.10 g/dL 0.94   EER Serum Prot. Electro. Reflex  See Note   Monoclonal Protein <=0.00 g/dL 0.35 (H)        Latest Reference Range & Units 08/22/24 12:00   Immunoglobulin A 68 - 408 mg/dL 206   Immunoglobulin G 768 - 1632 mg/dL 643 (L)   Immunoglobulin M 35 - 263 mg/dL 466 (H)                Assessment & Plan     1. IgM lambda monoclonal gammopathy  VISCOSITY, SERUM    Monoclonal Protein and FLC, Serum    Monoclonal Protein, Ur (24 hr or Random)    CBC WITH DIFFERENTIAL    Comp Metabolic Panel    BETA-2-MICROGLOBULIN      2. 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 Lung nodule        4. Parotid mass                1. MGUS - IgM type Lambda Monoclonal protein - Patient noted to have on labs in August 2024.  At this time his labs are very minimally abnormal, and recommendation would be to continue to monitor however do recommend proceeding with complete full workup.  I will repeat all labs including additional labs with serum light chains, and UPEP.  I will also proceed with a viscosity as beta-2 microglobulin.  Discussed with patient and family that MGUS is minimal diagnosis, however will need to rule out Waldenström's macroglobulinemia as well.  Will await repeat labs to determine whether bone marrow biopsy is warranted or not.  I will contact patient's wife once results have been back to discuss results and further plan of care.  I did briefly discussed the bone marrow biopsy in detail with patient and family today as well.    2.  Cutaneous follicle center lymphoma - patient is already established with radiation oncologist, Dr. Chauhan.  PET scan did not show any evidence of metastatic disease.  Will defer to Dr. Chauhan at this time for further management and monitoring of this.    3.  Parotid mass - Patient is status post FNA biopsy with Dr. Bernal back in July 2024 which was negative for malignancy.  However PET scan did show some hypermetabolic uptake.  I will forward this note to Dr. Monk and so he is aware of the PET/CT results to determine if any further workup is recommended on his part.  Will defer to ENT for this.    4.  Lung nodule - According to the patient and wife this has been present for quite some time.  Last CT scan in June 2024 showed nodule was stable at 7 mm.  Will defer to patient's PCP for continued management and monitoring of this as there is no acute concerning findings for malignancy.    5. Cervical stenosis - Patient slated to proceed with a corpectomy with neurosurgery.  That is currently on hold well all further workup is  being completed.  Patient established with Dr. Migdalia Merritt.         Please note that this dictation was created using voice recognition software. I have made every reasonable attempt to correct obvious errors, but I expect that there are errors of grammar and possibly content that I did not discover before finalizing the note.

## 2024-11-05 NOTE — TELEPHONE ENCOUNTER
Ana Lilia left a voice message letting us know that pt is on hold for surgery due to oncology issues and cannot have surgery at this time.

## 2024-11-07 ENCOUNTER — HOSPITAL ENCOUNTER (OUTPATIENT)
Dept: LAB | Facility: MEDICAL CENTER | Age: 69
End: 2024-11-07
Attending: FAMILY MEDICINE
Payer: MEDICARE

## 2024-11-07 ENCOUNTER — HOSPITAL ENCOUNTER (OUTPATIENT)
Dept: LAB | Facility: MEDICAL CENTER | Age: 69
End: 2024-11-07
Attending: NURSE PRACTITIONER
Payer: MEDICARE

## 2024-11-07 DIAGNOSIS — D47.2 IGM LAMBDA MONOCLONAL GAMMOPATHY: ICD-10-CM

## 2024-11-07 DIAGNOSIS — R53.83 OTHER FATIGUE: ICD-10-CM

## 2024-11-07 LAB
25(OH)D3 SERPL-MCNC: 44 NG/ML (ref 30–100)
ALBUMIN SERPL BCP-MCNC: 4.2 G/DL (ref 3.2–4.9)
ALBUMIN/GLOB SERPL: 1.3 G/DL
ALP SERPL-CCNC: 69 U/L (ref 30–99)
ALT SERPL-CCNC: 54 U/L (ref 2–50)
ANION GAP SERPL CALC-SCNC: 12 MMOL/L (ref 7–16)
ANISOCYTOSIS BLD QL SMEAR: ABNORMAL
AST SERPL-CCNC: 40 U/L (ref 12–45)
BASOPHILS # BLD AUTO: 1.4 % (ref 0–1.8)
BASOPHILS # BLD: 0.09 K/UL (ref 0–0.12)
BILIRUB SERPL-MCNC: 0.3 MG/DL (ref 0.1–1.5)
BUN SERPL-MCNC: 19 MG/DL (ref 8–22)
CALCIUM ALBUM COR SERPL-MCNC: 9.6 MG/DL (ref 8.5–10.5)
CALCIUM SERPL-MCNC: 9.8 MG/DL (ref 8.5–10.5)
CHLORIDE SERPL-SCNC: 104 MMOL/L (ref 96–112)
CO2 SERPL-SCNC: 23 MMOL/L (ref 20–33)
COMMENT 1642: NORMAL
CREAT SERPL-MCNC: 0.99 MG/DL (ref 0.5–1.4)
EOSINOPHIL # BLD AUTO: 0.22 K/UL (ref 0–0.51)
EOSINOPHIL NFR BLD: 3.4 % (ref 0–6.9)
ERYTHROCYTE [DISTWIDTH] IN BLOOD BY AUTOMATED COUNT: 46.1 FL (ref 35.9–50)
GFR SERPLBLD CREATININE-BSD FMLA CKD-EPI: 81 ML/MIN/1.73 M 2
GLOBULIN SER CALC-MCNC: 3.3 G/DL (ref 1.9–3.5)
GLUCOSE SERPL-MCNC: 111 MG/DL (ref 65–99)
HCT VFR BLD AUTO: 45 % (ref 42–52)
HGB BLD-MCNC: 13.2 G/DL (ref 14–18)
IMM GRANULOCYTES # BLD AUTO: 0.01 K/UL (ref 0–0.11)
IMM GRANULOCYTES NFR BLD AUTO: 0.2 % (ref 0–0.9)
LYMPHOCYTES # BLD AUTO: 1.77 K/UL (ref 1–4.8)
LYMPHOCYTES NFR BLD: 27.7 % (ref 22–41)
MCH RBC QN AUTO: 20.5 PG (ref 27–33)
MCHC RBC AUTO-ENTMCNC: 29.3 G/DL (ref 32.3–36.5)
MCV RBC AUTO: 69.8 FL (ref 81.4–97.8)
MICROCYTES BLD QL SMEAR: ABNORMAL
MONOCYTES # BLD AUTO: 0.61 K/UL (ref 0–0.85)
MONOCYTES NFR BLD AUTO: 9.6 % (ref 0–13.4)
MORPHOLOGY BLD-IMP: NORMAL
NEUTROPHILS # BLD AUTO: 3.68 K/UL (ref 1.82–7.42)
NEUTROPHILS NFR BLD: 57.7 % (ref 44–72)
NRBC # BLD AUTO: 0 K/UL
NRBC BLD-RTO: 0 /100 WBC (ref 0–0.2)
PLATELET # BLD AUTO: 295 K/UL (ref 164–446)
PLATELET BLD QL SMEAR: NORMAL
PMV BLD AUTO: 9.5 FL (ref 9–12.9)
POTASSIUM SERPL-SCNC: 4.8 MMOL/L (ref 3.6–5.5)
PROT SERPL-MCNC: 7.5 G/DL (ref 6–8.2)
RBC # BLD AUTO: 6.45 M/UL (ref 4.7–6.1)
RBC BLD AUTO: PRESENT
SODIUM SERPL-SCNC: 139 MMOL/L (ref 135–145)
T4 FREE SERPL-MCNC: 1.1 NG/DL (ref 0.93–1.7)
TSH SERPL-ACNC: 1.76 UIU/ML (ref 0.35–5.5)
VIT B12 SERPL-MCNC: 856 PG/ML (ref 211–911)
WBC # BLD AUTO: 6.4 K/UL (ref 4.8–10.8)

## 2024-11-07 PROCEDURE — 82607 VITAMIN B-12: CPT

## 2024-11-07 PROCEDURE — 85810 BLOOD VISCOSITY EXAMINATION: CPT

## 2024-11-07 PROCEDURE — 84403 ASSAY OF TOTAL TESTOSTERONE: CPT

## 2024-11-07 PROCEDURE — 82784 ASSAY IGA/IGD/IGG/IGM EACH: CPT

## 2024-11-07 PROCEDURE — 82306 VITAMIN D 25 HYDROXY: CPT

## 2024-11-07 PROCEDURE — 84156 ASSAY OF PROTEIN URINE: CPT

## 2024-11-07 PROCEDURE — 84443 ASSAY THYROID STIM HORMONE: CPT

## 2024-11-07 PROCEDURE — 80053 COMPREHEN METABOLIC PANEL: CPT

## 2024-11-07 PROCEDURE — 82232 ASSAY OF BETA-2 PROTEIN: CPT

## 2024-11-07 PROCEDURE — 84270 ASSAY OF SEX HORMONE GLOBUL: CPT

## 2024-11-07 PROCEDURE — 85025 COMPLETE CBC W/AUTO DIFF WBC: CPT

## 2024-11-07 PROCEDURE — 84402 ASSAY OF FREE TESTOSTERONE: CPT

## 2024-11-07 PROCEDURE — 84155 ASSAY OF PROTEIN SERUM: CPT

## 2024-11-07 PROCEDURE — 84439 ASSAY OF FREE THYROXINE: CPT

## 2024-11-07 PROCEDURE — 84165 PROTEIN E-PHORESIS SERUM: CPT

## 2024-11-07 PROCEDURE — 36415 COLL VENOUS BLD VENIPUNCTURE: CPT

## 2024-11-07 PROCEDURE — 83521 IG LIGHT CHAINS FREE EACH: CPT

## 2024-11-07 PROCEDURE — 86334 IMMUNOFIX E-PHORESIS SERUM: CPT

## 2024-11-09 LAB
B2 MICROGLOB SERPL-MCNC: 2.4 MG/L
SHBG SERPL-SCNC: 27 NMOL/L (ref 19–76)
TESTOST FREE MFR SERPL: 2.1 % (ref 1.6–2.9)
TESTOST FREE SERPL-MCNC: 93 PG/ML (ref 47–244)
TESTOST SERPL-MCNC: 443 NG/DL (ref 300–720)
TESTOSTERONE.FREE+WB SERPL-MCNC: 253 NG/DL (ref 131–682)

## 2024-11-11 LAB
ALBUMIN SERPL ELPH-MCNC: 4.08 G/DL (ref 3.75–5.01)
ALPHA1 GLOB SERPL ELPH-MCNC: 0.22 G/DL (ref 0.19–0.46)
ALPHA2 GLOB SERPL ELPH-MCNC: 0.9 G/DL (ref 0.48–1.05)
B-GLOBULIN SERPL ELPH-MCNC: 0.87 G/DL (ref 0.48–1.1)
EER MONOCLONAL PROTEIN AND FLC, SERUM Q5224: ABNORMAL
GAMMA GLOB SERPL ELPH-MCNC: 0.93 G/DL (ref 0.62–1.51)
IGA SERPL-MCNC: 211 MG/DL (ref 68–408)
IGG SERPL-MCNC: 652 MG/DL (ref 768–1632)
IGM SERPL-MCNC: 479 MG/DL (ref 35–263)
INTERPRETATION SERPL IFE-IMP: ABNORMAL
INTERPRETATION SERPL IFE-IMP: ABNORMAL
KAPPA LC FREE SER-MCNC: 28.42 MG/L (ref 3.3–19.4)
KAPPA LC FREE/LAMBDA FREE SER NEPH: 1.39 {RATIO} (ref 0.26–1.65)
LAMBDA LC FREE SERPL-MCNC: 20.51 MG/L (ref 5.71–26.3)
MONOCLONAL PROTEIN NL11656: 0.37 G/DL
PROT SERPL-MCNC: 7 G/DL (ref 6.3–8.2)
VISC SER: 1.14 CP

## 2024-11-12 LAB
ALBUMIN 24H MFR UR ELPH: 91.7 %
ALPHA1 GLOB 24H MFR UR ELPH: 0.6 %
ALPHA2 GLOB 24H MFR UR ELPH: 0.4 %
B-GLOBULIN 24H MFR UR ELPH: 5.7 %
COLLECT DURATION TIME SPEC: NORMAL HR
EER MONOCLONAL PROTEIN STUDY, 24 HOUR U Q5964: NORMAL
GAMMA GLOB 24H MFR UR ELPH: 1.6 %
INTERPRETATION UR IFE-IMP: NORMAL
M PROTEIN 24H MFR UR ELPH: 0 %
M PROTEIN 24H UR ELPH-MRATE: NORMAL MG/24 HRS
PROT 24H UR-MRATE: 93 MG/DL
PROT 24H UR-MRATE: NORMAL G/(24.H) (ref 40–150)
SPECIMEN VOL ?TM UR: NORMAL ML

## 2024-11-14 ENCOUNTER — APPOINTMENT (OUTPATIENT)
Dept: MEDICAL GROUP | Facility: MEDICAL CENTER | Age: 69
End: 2024-11-14
Payer: MEDICARE

## 2024-11-14 ENCOUNTER — TELEPHONE (OUTPATIENT)
Dept: HEMATOLOGY ONCOLOGY | Facility: MEDICAL CENTER | Age: 69
End: 2024-11-14

## 2024-11-14 VITALS
SYSTOLIC BLOOD PRESSURE: 132 MMHG | HEART RATE: 68 BPM | BODY MASS INDEX: 29.23 KG/M2 | HEIGHT: 70 IN | WEIGHT: 204.15 LBS | TEMPERATURE: 97.7 F | OXYGEN SATURATION: 96 % | DIASTOLIC BLOOD PRESSURE: 80 MMHG

## 2024-11-14 DIAGNOSIS — D47.2 IGM LAMBDA MONOCLONAL GAMMOPATHY: ICD-10-CM

## 2024-11-14 DIAGNOSIS — C82.61 CUTANEOUS FOLLICLE CENTER LYMPHOMA INVOLVING LYMPH NODES OF HEAD (HCC): ICD-10-CM

## 2024-11-14 DIAGNOSIS — K11.8 PAROTID MASS: ICD-10-CM

## 2024-11-14 DIAGNOSIS — M48.02 CERVICAL STENOSIS OF SPINE: ICD-10-CM

## 2024-11-14 DIAGNOSIS — I10 ESSENTIAL HYPERTENSION: ICD-10-CM

## 2024-11-14 DIAGNOSIS — E11.29 TYPE 2 DIABETES MELLITUS WITH MICROALBUMINURIA, WITHOUT LONG-TERM CURRENT USE OF INSULIN (HCC): ICD-10-CM

## 2024-11-14 DIAGNOSIS — R80.9 TYPE 2 DIABETES MELLITUS WITH MICROALBUMINURIA, WITHOUT LONG-TERM CURRENT USE OF INSULIN (HCC): ICD-10-CM

## 2024-11-14 PROCEDURE — 3079F DIAST BP 80-89 MM HG: CPT | Performed by: FAMILY MEDICINE

## 2024-11-14 PROCEDURE — 3075F SYST BP GE 130 - 139MM HG: CPT | Performed by: FAMILY MEDICINE

## 2024-11-14 PROCEDURE — 99214 OFFICE O/P EST MOD 30 MIN: CPT | Performed by: FAMILY MEDICINE

## 2024-11-14 ASSESSMENT — ENCOUNTER SYMPTOMS
CHILLS: 0
NECK PAIN: 1
FEVER: 0
PALPITATIONS: 0

## 2024-11-14 ASSESSMENT — FIBROSIS 4 INDEX: FIB4 SCORE: 1.31

## 2024-11-14 NOTE — ASSESSMENT & PLAN NOTE
Chronic condition, stable, continue metformin 500 mg 2 times daily and Jardiance 20-minute daily.  Recheck labs with next test.

## 2024-11-14 NOTE — PROGRESS NOTES
FAMILY MEDICINE VISIT                                                               Assessment/Plan:         Problem List Items Addressed This Visit       Cervical stenosis of spine     Chronic condition, unstable, surgery is on hold due to other workup going on medical condition.  Follow-up with neurosurgery         Cutaneous follicle center lymphoma (HCC)     Chronic condition, unstable, follow-up with radiation oncology         Essential hypertension     Chronic condition, stable, continue valsartan, amlodipine and metoprolol.         IgM lambda monoclonal gammopathy     Chronic condition, unstable, follow-up with oncology         Parotid mass     Chronic condition, unstable, recommended to follow-up with ENT for further evaluation.         Type 2 diabetes mellitus with microalbuminuria, without long-term current use of insulin (HCC)     Chronic condition, stable, continue metformin 500 mg 2 times daily and Jardiance 20-minute daily.  Recheck labs with next test.         Relevant Medications    metFORMIN (GLUCOPHAGE) 500 MG Tab    Other Relevant Orders    Comp Metabolic Panel    HEMOGLOBIN A1C    Lipid Profile    MICROALBUMIN CREAT RATIO URINE          Follow up in 4 months for lab follow-up.      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    History of present illness: Raf Spear is a 69 y.o. male who presented for lab follow-up.    Problem   Igm Lambda Monoclonal Gammopathy    Reviewed oncology notes.  MGUS is minimal diagnosis, however will need to rule out Waldenström's macroglobulinemia as well  Blood test was ordered to further evaluate this     Cervical Stenosis of Spine    Planning to have corpectomy with  neurosurgery Dr. Migdalia Merritt.      Cutaneous Follicle Center Lymphoma (Hcc)    Establish with radiation oncologist Dr. Chauhan. PET scan did not show any evidence of metastatic disease.    Plan is to have radiation therapy after dealing with other medical conditions     Parotid Mass     He was seen in ER and they did CT soft tissue neck on 02/10/2024, which showed prominent 13 into 8 mm nodule in right parotid gland, questionable parotid mass versus intraparotid lymph node.   Patient is status post FNA biopsy with Dr. Bernal back in July 2024 which was negative for malignancy.  However PET scan did show some hypermetabolic uptake.       Essential Hypertension     has history of hypertension, currently on valsartan 320 mg, amlodipine 7.5 mg daily and metoprolol 50 mg daily. BP today: 132/80.  No chest pain, palpitations, shortness of breath, lower leg swelling.     Type 2 Diabetes Mellitus With Microalbuminuria, Without Long-Term Current Use of Insulin ([facility])     He is on metformin 500 mg 2 times daily which she has been taking recently.  He is on Jardiance 25 mg daily.      Lab Results   Component Value Date/Time    HBA1C 6.4 (H) 08/22/2024 11:30 AM               Review of systems:     Review of Systems   Constitutional:  Negative for chills and fever.   Cardiovascular:  Negative for chest pain, palpitations and leg swelling.   Musculoskeletal:  Positive for neck pain.        Medications and Allergies:     Current Outpatient Medications   Medication Sig Dispense Refill    metFORMIN (GLUCOPHAGE) 500 MG Tab Take 1 Tablet by mouth 2 times a day with meals. 200 Tablet 3    Empagliflozin (JARDIANCE) 25 MG Tab Take 1 Tablet by mouth every day. 100 Tablet 3    valsartan (DIOVAN) 320 MG tablet Take 1 Tablet by mouth every day. 100 Tablet 3    tizanidine (ZANAFLEX) 2 MG tablet Take 1 tablet every 8 hours by oral route as needed for 30 days. 90 Tablet 0    omeprazole (PRILOSEC) 20 MG delayed-release capsule Take 1 capsule by  "mouth twice a day 30 min before meals 180 Capsule 4    amLODIPine (NORVASC) 5 MG Tab Take 1 Tablet by mouth every day. For high blood pressure 100 Tablet 3    metoprolol SR (TOPROL XL) 50 MG TABLET SR 24 HR Take 1 Tablet by mouth every day. For heart and blood pressure 100 Tablet 3    amLODIPine (NORVASC) 2.5 MG Tab Take 1 Tablet by mouth every day. Along with amlodipine 5 mg for today of 7.5 mg/day for blood pressure 100 Tablet 3    sertraline (ZOLOFT) 50 MG Tab Take 1 Tablet by mouth every day. For anxiety 100 Tablet 3    Omega-3 Fatty Acids (FISH OIL) 1000 MG Cap capsule Take 1 Capsule by mouth 3 times a day with meals.      Ascorbic Acid (VITAMIN C CR) 1000 MG Tab CR Take  by mouth.      vitamin D3 (CHOLECALCIFEROL) 1000 Unit (25 mcg) Tab Take 1 Tablet by mouth every day.      glucosamine Sulfate 500 MG Cap Take 1 Capsule by mouth 3 times a day with meals.      ASPIRIN LOW DOSE PO Take 5 mg by mouth.      Methylsulfonylmethane (MSM) 1000 MG Cap Take 1 Capsule by mouth.      Multiple Vitamin (MULTI-VITAMIN DAILY PO) Take  by mouth.       No current facility-administered medications for this visit.          Vitals:    /80   Pulse 68   Temp 36.5 °C (97.7 °F) (Temporal)   Ht 1.778 m (5' 10\")   Wt 92.6 kg (204 lb 2.3 oz)   SpO2 96%  Body mass index is 29.29 kg/m².    Physical Exam:     Physical Exam  Constitutional:       Appearance: Normal appearance. He is well-developed and well-groomed.   HENT:      Head: Normocephalic and atraumatic.      Right Ear: External ear normal.      Left Ear: External ear normal.   Eyes:      General:         Right eye: No discharge.         Left eye: No discharge.      Conjunctiva/sclera: Conjunctivae normal.   Cardiovascular:      Rate and Rhythm: Normal rate.   Pulmonary:      Effort: Pulmonary effort is normal. No respiratory distress.   Musculoskeletal:      Cervical back: Neck supple.   Skin:     Findings: No rash.   Neurological:      Mental Status: He is alert. "   Psychiatric:         Mood and Affect: Mood and affect normal.         Behavior: Behavior normal.          Labs:  I reviewed with patient recent labs resulted on 11/7/2024.        Please note that this dictation was created using voice recognition software. I have made every reasonable attempt to correct obvious errors, but I expect that there are errors of grammar and possibly content that I did not discover before finalizing the note.

## 2024-11-14 NOTE — ASSESSMENT & PLAN NOTE
Chronic condition, unstable, surgery is on hold due to other workup going on medical condition.  Follow-up with neurosurgery

## 2024-11-15 NOTE — TELEPHONE ENCOUNTER
Patient originally seen back on 11/4/2024 after referral from PCP for MGUS.    Patient with multiple things going on at this time and is currently under workup for possible ALS or myasthenia gravis with neurology.  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  He has been seen by radiation oncology and treatment is deferred at this time until patient completes full workup for his neurologic issues.  Patient also with a parotid mass that he has been seen by ENT and is status post FNA biopsy in July 2024 and found to be benign.  However PET/CT that was completed after his lymphoma diagnosis did show a right parotid nodule that was hypermetabolic.  Confirmed with patient and wife today that he does have a follow-up visit early with Dr. Bernal, ENT this coming Monday, 11/18/2024.  Patient also with a lung nodule on CT scan that was stable at 7 mm and is monitored by PCP.    Also patient did undergo labs with an SPEP on 8/22/2024 which did show an M spike at 0.35.  Immunofixation was positive for IgM type lambda monoclonal protein.  IgA was within normal limits, IgM was slightly elevated at 466, IgG was slightly low at 643.  He is mildly anemic with a hemoglobin of 13.8, no evidence of kidney dysfunction or hypercalcemia.  I did recommend further labs including light chains, UPEP and a beta-2 microglobulin and viscosity..    Kappa light chain was elevated but the light chain ratio was normal at 1.39.  Beta-2 microglobulin level and viscosity were within normal limits.  UPEP showed no monoclonal proteins with urine immunofixation being negative for any monoclonal free light chains.    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  Labs do not warrant proceeding with bone marrow biopsy at this time.  Patient is currently being worked up for neurologic issues with his neurologist and  neurosurgeon, and he is also being seen in follow-up for the parotid mass with ENT.  It is reasonable to hold off and continue to monitor labs with repeat labs again in 3 months and a follow-up visit at that time.  Discussed this in detail with the patient and his wife today and they both verbalized understanding's and agreement the plan.        IMAGING  CT-PETCT-WHOLE BODY     Result Date: 10/29/2024  10/28/2024 2:50 PM HISTORY/REASON FOR EXAM:  follicle center B-cell lymphoma on skin bx TECHNIQUE/EXAM DESCRIPTION AND NUMBER OF VIEWS: PET body imaging. Initially, 11.55 mCi F-18 FDG was administered intravenously under standardized conditions. Approximately 45 minutes after FDG administration, the patient was placed in the supine position on the PET CT table. Blood glucose level was 103 mg/dL. Low dose spiral CT imaging from the cranial vertex through the feet was performed. Whole body PET imaging was then performed from the cranial vertex through the feet. CT images, PET images, and PET/CT fused images were reviewed on a PACS 3D workstation. The limited non-contrast CT data are used primarily for attenuation correction and anatomic correlation.  Evaluation of solid organs and bowel are especially limited utilizing this technique. COMPARISON: CTA neck 9/6/2024 FINDINGS: Head and neck: Focal intense increased activity corresponds to nodule at the posterior aspect RIGHT parotid gland, max SUV 13.66. Chest: No abnormal activity in the lungs or mediastinum.  Mild increased metabolic activity corresponds to apparent fluid collection in the subcutaneous soft tissues of the LEFT upper back, max SUV 3.33. Abdomen and pelvis: No abnormal focal FDG activity. Musculoskeletal: No focal abnormal metabolic activity. Lower extremities: Physiologic uptake in the LEFT lower leg. Incidental findings on CT: RIGHT parotid nodule posteriorly measuring 11 mm.  Degenerative change of thoracic spine.  Fatty infiltration of liver.   Gallstone noted.  Nonspecific LEFT adrenal nodule measuring 1.7 cm, without PET correlate.  Small nonobstructing bilateral kidney stones.  Probable LEFT kidney cyst.  Sparse colonic diverticula.      1.  Hypermetabolic RIGHT parotid nodule may indicate benign or malignant process. 2.  Mild uptake corresponding to subcutaneous fluid collection in the LEFT upper back, inflammatory versus postoperative fluid collection. 3.  No evidence of metastatic disease. 4.  Fatty infiltration of liver.         PATHOLOGY  FINAL DIAGNOSIS:   07/23/24  A. Right parotid gland fine needle aspiration:          Adequate for interpretation.          Diagnostic category: Benign / reactive.          Benign epithelial cells and small mature appearing lymphocytes.         LABORATORY RESULTS   Latest Reference Range & Units 11/07/24 11:00   Viscosity <=1.50 cP 1.14      Latest Reference Range & Units 11/07/24 11:00   Beta 2 Microglobulin <=3.0 mg/L 2.4      Latest Reference Range & Units 11/07/24 11:00   Free Kappa Light Chains 3.30 - 19.40 mg/L 28.42 (H)   Free Lambda Light Chains 5.71 - 26.30 mg/L 20.51   Kappa-Lambda Ratio 0.26 - 1.65  1.39           Latest Reference Range & Units 08/22/24 12:00   WBC 4.8 - 10.8 K/uL 7.6   RBC 4.70 - 6.10 M/uL 6.47 (H)   Hemoglobin 14.0 - 18.0 g/dL 13.8 (L)   Hematocrit 42.0 - 52.0 % 46.1   MCV 81.4 - 97.8 fL 71.3 (L)   MCH 27.0 - 33.0 pg 21.3 (L)   MCHC 32.3 - 36.5 g/dL 29.9 (L)   RDW 35.9 - 50.0 fL 45.0   Platelet Count 164 - 446 K/uL 278   MPV 9.0 - 12.9 fL 9.4   Neutrophils-Polys 44.00 - 72.00 % 62.80   Neutrophils (Absolute) 1.82 - 7.42 K/uL 4.79   Lymphocytes 22.00 - 41.00 % 25.10   Lymphs (Absolute) 1.00 - 4.80 K/uL 1.91   Monocytes 0.00 - 13.40 % 8.40   Monos (Absolute) 0.00 - 0.85 K/uL 0.64   Eosinophils 0.00 - 6.90 % 2.50   Eos (Absolute) 0.00 - 0.51 K/uL 0.19   Basophils 0.00 - 1.80 % 0.90   Baso (Absolute) 0.00 - 0.12 K/uL 0.07   Immature Granulocytes 0.00 - 0.90 % 0.30   Immature Granulocytes  (abs) 0.00 - 0.11 K/uL 0.02   Nucleated RBC 0.00 - 0.20 /100 WBC 0.00   NRBC (Absolute) K/uL 0.00   Plt Estimation   Normal   RBC Morphology   Present   Anisocytosis   1+   Microcytosis   1+   Polychromia   1+   Sed Rate Westergren 0 - 20 mm/hour 3   Sodium 135 - 145 mmol/L 138   Potassium 3.6 - 5.5 mmol/L 4.3   Chloride 96 - 112 mmol/L 103   Co2 20 - 33 mmol/L 22   Anion Gap 7.0 - 16.0  13.0   Glucose 65 - 99 mg/dL 104 (H)   Bun 8 - 22 mg/dL 18   Creatinine 0.50 - 1.40 mg/dL 0.92   GFR (CKD-EPI) >60 mL/min/1.73 m 2 89   Calcium 8.4 - 10.2 mg/dL 9.2   Correct Calcium 8.5 - 10.5 mg/dL 9.0   AST(SGOT) 12 - 45 U/L 26   ALT(SGPT) 2 - 50 U/L 42   Alkaline Phosphatase 30 - 99 U/L 79   Total Bilirubin 0.1 - 1.5 mg/dL 0.4   Albumin 3.2 - 4.9 g/dL 4.3   Total Protein 6.0 - 8.2 g/dL 7.4   Globulin 1.9 - 3.5 g/dL 3.1   A-G Ratio g/dL 1.4        Latest Reference Range & Units 08/22/24 12:00   Albumin 3.75 - 5.01 g/dL 4.07   Gamma Globulin 0.62 - 1.51 g/dL 0.89   Alpha-1 Globulin 0.19 - 0.46 g/dL 0.27   Alpha-2 Globulin 0.48 - 1.05 g/dL 1.04   Beta Globulin 0.48 - 1.10 g/dL 0.94   EER Serum Prot. Electro. Reflex   See Note   Monoclonal Protein <=0.00 g/dL 0.35 (H)           Latest Reference Range & Units 08/22/24 12:00   Immunoglobulin A 68 - 408 mg/dL 206   Immunoglobulin G 768 - 1632 mg/dL 643 (L)   Immunoglobulin M 35 - 263 mg/dL 466 (H)

## 2024-11-24 ENCOUNTER — PATIENT MESSAGE (OUTPATIENT)
Dept: MEDICAL GROUP | Facility: MEDICAL CENTER | Age: 69
End: 2024-11-24
Payer: MEDICARE

## 2024-11-25 ENCOUNTER — PATIENT MESSAGE (OUTPATIENT)
Dept: MEDICAL GROUP | Facility: MEDICAL CENTER | Age: 69
End: 2024-11-25
Payer: MEDICARE

## 2024-11-27 ENCOUNTER — APPOINTMENT (OUTPATIENT)
Dept: MEDICAL GROUP | Facility: MEDICAL CENTER | Age: 69
End: 2024-11-27
Payer: MEDICARE

## 2024-11-27 NOTE — PATIENT COMMUNICATION
Received voice message from Ana Lilia at [facility] regarding this. Phone: [phone]

## 2024-12-03 ENCOUNTER — APPOINTMENT (OUTPATIENT)
Dept: MEDICAL GROUP | Facility: MEDICAL CENTER | Age: 69
End: 2024-12-03
Payer: MEDICARE

## 2024-12-03 VITALS
BODY MASS INDEX: 29.57 KG/M2 | HEIGHT: 70 IN | SYSTOLIC BLOOD PRESSURE: 124 MMHG | WEIGHT: 206.57 LBS | HEART RATE: 73 BPM | TEMPERATURE: 97.7 F | DIASTOLIC BLOOD PRESSURE: 80 MMHG | OXYGEN SATURATION: 96 %

## 2024-12-03 DIAGNOSIS — M48.02 CERVICAL STENOSIS OF SPINE: ICD-10-CM

## 2024-12-03 DIAGNOSIS — R94.31 QT PROLONGATION: ICD-10-CM

## 2024-12-03 DIAGNOSIS — I10 ESSENTIAL HYPERTENSION: ICD-10-CM

## 2024-12-03 DIAGNOSIS — R80.9 TYPE 2 DIABETES MELLITUS WITH MICROALBUMINURIA, WITHOUT LONG-TERM CURRENT USE OF INSULIN (HCC): ICD-10-CM

## 2024-12-03 DIAGNOSIS — Z01.818 PREOP EXAMINATION: ICD-10-CM

## 2024-12-03 DIAGNOSIS — E11.29 TYPE 2 DIABETES MELLITUS WITH MICROALBUMINURIA, WITHOUT LONG-TERM CURRENT USE OF INSULIN (HCC): ICD-10-CM

## 2024-12-03 DIAGNOSIS — I77.810 ASCENDING AORTA DILATION (HCC): ICD-10-CM

## 2024-12-03 LAB
HBA1C MFR BLD: 6.5 % (ref ?–5.8)
POCT INT CON NEG: NEGATIVE
POCT INT CON POS: POSITIVE

## 2024-12-03 PROCEDURE — 3079F DIAST BP 80-89 MM HG: CPT | Performed by: FAMILY MEDICINE

## 2024-12-03 PROCEDURE — 99214 OFFICE O/P EST MOD 30 MIN: CPT | Performed by: FAMILY MEDICINE

## 2024-12-03 PROCEDURE — 3074F SYST BP LT 130 MM HG: CPT | Performed by: FAMILY MEDICINE

## 2024-12-03 PROCEDURE — 83036 HEMOGLOBIN GLYCOSYLATED A1C: CPT | Performed by: FAMILY MEDICINE

## 2024-12-03 ASSESSMENT — FIBROSIS 4 INDEX: FIB4 SCORE: 1.31

## 2024-12-03 NOTE — PROGRESS NOTES
PRE-OPERATIVE EXAMINATION          Preston was seen today for follow-up.    Diagnoses and all orders for this visit:    Cervical stenosis of spine    Preop examination  -     EKG - Clinic Performed    Patient is undergoing an elective moderate risk surgery.  He has RCRI risk score of 0 with 3.9 % 30-day risk of death, MI, or cardiac arrest.  He is at moderate risk due to his medical history.  EKG in office showed no changes except prolonged QTc interval.  Recommend not to give medications that we will increase this interval more.  EKG also showed right bundle branch block which was seen on previous EKG and was seen by cardiology who ordered echocardiogram.  Echocardiogram came back normal.  He can proceed with surgery without any further testing.  Recommend to stop aspirin 7 days before surgery and resume in 48 hours after hemostasis is achieved.  Paperwork filled and we will send the paperwork to surgeons office today.    Type 2 diabetes mellitus with microalbuminuria, without long-term current use of insulin (HCC):  Chronic condition, stable, continue metformin and Jardiance at same dose.      Lab Results   Component Value Date/Time    HBA1C 6.5 (A) 12/03/2024 11:46 AM       -     POCT  A1C    QTc prolongation  New condition, prolonged QT c seen on EKG, avoid medication that causes prolonged QTc interval    Ascending aorta dilation (HCC)  Chronic condition, stable, recent echocardiogram showed ascending aorta dilation at 3.9    Essential hypertension  Chronic condition, stable, continue amlodipine 7.5 mg daily, metoprolol 50 mg daily, valsartan 320 mg daily.       Follow up in March 25 for lab follow-up, sooner as needed      Chief complaint::Diagnoses of Cervical stenosis of spine, Preop examination, Type 2 diabetes mellitus with microalbuminuria, without long-term current use of insulin (HCC), QT prolongation, Ascending aorta dilation (HCC), and Essential hypertension were pertinent to this visit.       Patient with  past medical history of type 2 diabetes mellitus, essential hypertension, ascending aorta dilation, cervical stenosis of spine, IgM normal gammopathy, parotid mass, cutaneous follicular cell lymphoma  who is planning to undergo C6, possibly C7 corpectomy under General by Dr. Singer on 12/18/2024.  Patient has not had complications with anesthesia in the past.        Review of systems:    Chest pain:  no  Shortness of breath: no  Shortness of breath with exertion: no  Orthopnea: no  Dizziness: no  Unexplained weight change:no    Other Review of Systems:    PMH:   has a past medical history of Basal cell carcinoma, Cough, Daytime sleepiness, Diabetes (HCC), [place] measles, Hearing difficulty, Hyperlipidemia, Hypertension, Influenza, Morning headache, Mumps, Snoring, Squamous cell carcinoma in situ (SCCIS) of skin, and Wears glasses.    CAD: no  HTN: yes  CKD: no  DM:  yes on insulin: no  History of CVA: no    Current chronic conditions: No problem-specific Assessment & Plan notes found for this encounter.    Surgical and anesthetic history:  has a past surgical history that includes lumbar decompression and laminotomy. Prior surgery without complication, bleeding, reaction to anesthetic, prolonged recovery  Habits:   Social History     Tobacco Use    Smoking status: Every Day     Current packs/day: 0.30     Average packs/day: 0.3 packs/day for 48.7 years (14.6 ttl pk-yrs)     Types: Cigarettes     Start date: 4/1/1976    Smokeless tobacco: Never    Tobacco comments:     Have quit and restarted several times.  Smoking about 4 cigarettes per day (11/2024). Also uses nicorette mints.   Vaping Use    Vaping status: Never Used   Substance Use Topics    Alcohol use: Not Currently     Alcohol/week: 1.2 oz     Types: 2 Cans of beer per week     Comment: Social    Drug use: Never     Allergies: Patient has no known allergies. No known allergy to Anesthetic, or Latex.     Current medicines:   Current Outpatient Medications    Medication Sig Dispense Refill    metFORMIN (GLUCOPHAGE) 500 MG Tab Take 1 Tablet by mouth 2 times a day with meals. 200 Tablet 3    Empagliflozin (JARDIANCE) 25 MG Tab Take 1 Tablet by mouth every day. 100 Tablet 3    valsartan (DIOVAN) 320 MG tablet Take 1 Tablet by mouth every day. 100 Tablet 3    tizanidine (ZANAFLEX) 2 MG tablet Take 1 tablet every 8 hours by oral route as needed for 30 days. 90 Tablet 0    omeprazole (PRILOSEC) 20 MG delayed-release capsule Take 1 capsule by mouth twice a day 30 min before meals 180 Capsule 4    amLODIPine (NORVASC) 5 MG Tab Take 1 Tablet by mouth every day. For high blood pressure 100 Tablet 3    metoprolol SR (TOPROL XL) 50 MG TABLET SR 24 HR Take 1 Tablet by mouth every day. For heart and blood pressure 100 Tablet 3    amLODIPine (NORVASC) 2.5 MG Tab Take 1 Tablet by mouth every day. Along with amlodipine 5 mg for today of 7.5 mg/day for blood pressure 100 Tablet 3    sertraline (ZOLOFT) 50 MG Tab Take 1 Tablet by mouth every day. For anxiety 100 Tablet 3    Omega-3 Fatty Acids (FISH OIL) 1000 MG Cap capsule Take 1 Capsule by mouth 3 times a day with meals.      Ascorbic Acid (VITAMIN C CR) 1000 MG Tab CR Take  by mouth.      vitamin D3 (CHOLECALCIFEROL) 1000 Unit (25 mcg) Tab Take 1 Tablet by mouth every day.      glucosamine Sulfate 500 MG Cap Take 1 Capsule by mouth 3 times a day with meals.      ASPIRIN LOW DOSE PO Take 5 mg by mouth.      Methylsulfonylmethane (MSM) 1000 MG Cap Take 1 Capsule by mouth.      Multiple Vitamin (MULTI-VITAMIN DAILY PO) Take  by mouth.       No current facility-administered medications for this visit.         Social History     Tobacco Use    Smoking status: Every Day     Current packs/day: 0.30     Average packs/day: 0.3 packs/day for 48.7 years (14.6 ttl pk-yrs)     Types: Cigarettes     Start date: 4/1/1976    Smokeless tobacco: Never    Tobacco comments:     Have quit and restarted several times.  Smoking about 4 cigarettes per day  (2024). Also uses nicorette mints.   Vaping Use    Vaping status: Never Used   Substance Use Topics    Alcohol use: Not Currently     Alcohol/week: 1.2 oz     Types: 2 Cans of beer per week     Comment: Social    Drug use: Never        Vitals:    24 1102   BP: 124/80   Pulse: 73   Temp: 36.5 °C (97.7 °F)   SpO2: 96%        Physical Exam  Constitutional:       Appearance: Normal appearance. He is well-developed and well-groomed.   HENT:      Head: Normocephalic and atraumatic.   Eyes:      General:         Right eye: No discharge.         Left eye: No discharge.      Conjunctiva/sclera: Conjunctivae normal.   Cardiovascular:      Rate and Rhythm: Normal rate and regular rhythm.      Heart sounds: Normal heart sounds. No murmur heard.     No friction rub. No gallop.   Pulmonary:      Effort: Pulmonary effort is normal. No respiratory distress.      Breath sounds: Normal breath sounds. No wheezing or rales.   Neurological:      General: No focal deficit present.      Mental Status: He is alert. Mental status is at baseline.      Gait: Gait is intact.   Psychiatric:         Mood and Affect: Mood and affect normal.         Behavior: Behavior normal.            Revised Cardiac Risk Index (RCRI) for Pre-Operative Risk   (estimates risk of cardiac complications after noncardiac surgery)    High-risk surgery: No 0 / Yes +1  Intraperitoneal, intrathoracic, suprainguinal vascular  History of ischemic heart disease: No 0 / Yes +1  Hx of MI, (+) exercise test, current chest pain considered due to myocardial ischemia, use of nitrate therapy or ECG with pathological Q waves)  History of CHF: No 0 / Yes +1  Pulmonary edema, B/L rales or S3 gallop; PAGE, orthopnea, PND, CXR showing pulmonary vascular redistribution)  History of cerebrovascular disease: No 0 / Yes +1  Prior TIA or stroke  Pre-operative treatment with insulin: No 0 / Yes +1  Pre-operative creatinine >2 mg/dL: No 0 / Yes +1    RCRI Scorin points: Class I  Risk, 3.9% 30-day risk of death, MI, or cardiac arrest  1 point: Class II Risk, 6.0% 30-day risk of death, MI, or cardiac arrest  2 points: Class III Risk, 10.1% 30-day risk of death, MI, or cardiac arrest  3 points: Class IV Risk, 15% 30-day risk of death, MI, or cardiac arrest  4 points: Class IV Risk, 15% 30-day risk of death, MI, or cardiac arrest  5 points: Class IV Risk, 15% 30-day risk of death, MI, or cardiac arrest  6 points: Class IV Risk, 15% 30-day risk of death, MI, or cardiac arrest    Lab Results   Component Value Date/Time    CREATININE 0.99 11/07/2024 11:00 AM           Please note that this dictation was created using voice recognition software. I have made every reasonable attempt to correct obvious errors, but I expect that there are errors of grammar and possibly content that I did not discover before finalizing the note.

## 2024-12-09 DIAGNOSIS — F41.1 GAD (GENERALIZED ANXIETY DISORDER): ICD-10-CM

## 2024-12-10 ENCOUNTER — APPOINTMENT (OUTPATIENT)
Dept: ADMISSIONS | Facility: MEDICAL CENTER | Age: 69
DRG: 430 | End: 2024-12-10
Attending: NEUROLOGICAL SURGERY
Payer: MEDICARE

## 2024-12-10 PROCEDURE — RXMED WILLOW AMBULATORY MEDICATION CHARGE: Performed by: FAMILY MEDICINE

## 2024-12-12 ENCOUNTER — HOSPITAL ENCOUNTER (OUTPATIENT)
Dept: RADIOLOGY | Facility: MEDICAL CENTER | Age: 69
DRG: 430 | End: 2024-12-12
Attending: NEUROLOGICAL SURGERY | Admitting: NEUROLOGICAL SURGERY
Payer: MEDICARE

## 2024-12-12 ENCOUNTER — PHARMACY VISIT (OUTPATIENT)
Dept: PHARMACY | Facility: MEDICAL CENTER | Age: 69
End: 2024-12-12
Payer: COMMERCIAL

## 2024-12-12 ENCOUNTER — PRE-ADMISSION TESTING (OUTPATIENT)
Dept: ADMISSIONS | Facility: MEDICAL CENTER | Age: 69
DRG: 430 | End: 2024-12-12
Attending: NEUROLOGICAL SURGERY
Payer: MEDICARE

## 2024-12-12 DIAGNOSIS — Z01.812 PRE-OPERATIVE LABORATORY EXAMINATION: ICD-10-CM

## 2024-12-12 DIAGNOSIS — Z01.811 PRE-OPERATIVE RESPIRATORY EXAMINATION: ICD-10-CM

## 2024-12-12 LAB
ABO GROUP BLD: NORMAL
ANION GAP SERPL CALC-SCNC: 13 MMOL/L (ref 7–16)
APTT PPP: 30.7 SEC (ref 24.7–36)
BASOPHILS # BLD AUTO: 1 % (ref 0–1.8)
BASOPHILS # BLD: 0.08 K/UL (ref 0–0.12)
BLD GP AB SCN SERPL QL: NORMAL
BUN SERPL-MCNC: 19 MG/DL (ref 8–22)
CALCIUM SERPL-MCNC: 9.3 MG/DL (ref 8.5–10.5)
CHLORIDE SERPL-SCNC: 105 MMOL/L (ref 96–112)
CO2 SERPL-SCNC: 20 MMOL/L (ref 20–33)
COMMENT 1642: NORMAL
CREAT SERPL-MCNC: 1.14 MG/DL (ref 0.5–1.4)
EOSINOPHIL # BLD AUTO: 0.19 K/UL (ref 0–0.51)
EOSINOPHIL NFR BLD: 2.4 % (ref 0–6.9)
ERYTHROCYTE [DISTWIDTH] IN BLOOD BY AUTOMATED COUNT: 45.3 FL (ref 35.9–50)
GFR SERPLBLD CREATININE-BSD FMLA CKD-EPI: 68 ML/MIN/1.73 M 2
GLUCOSE SERPL-MCNC: 138 MG/DL (ref 65–99)
HCT VFR BLD AUTO: 42 % (ref 42–52)
HGB BLD-MCNC: 12.3 G/DL (ref 14–18)
IMM GRANULOCYTES # BLD AUTO: 0.03 K/UL (ref 0–0.11)
IMM GRANULOCYTES NFR BLD AUTO: 0.4 % (ref 0–0.9)
INR PPP: 1.08 (ref 0.87–1.13)
LYMPHOCYTES # BLD AUTO: 1.9 K/UL (ref 1–4.8)
LYMPHOCYTES NFR BLD: 23.8 % (ref 22–41)
MCH RBC QN AUTO: 20.1 PG (ref 27–33)
MCHC RBC AUTO-ENTMCNC: 29.3 G/DL (ref 32.3–36.5)
MCV RBC AUTO: 68.7 FL (ref 81.4–97.8)
MICROCYTES BLD QL SMEAR: ABNORMAL
MONOCYTES # BLD AUTO: 0.72 K/UL (ref 0–0.85)
MONOCYTES NFR BLD AUTO: 9 % (ref 0–13.4)
MORPHOLOGY BLD-IMP: NORMAL
NEUTROPHILS # BLD AUTO: 5.06 K/UL (ref 1.82–7.42)
NEUTROPHILS NFR BLD: 63.4 % (ref 44–72)
NRBC # BLD AUTO: 0 K/UL
NRBC BLD-RTO: 0 /100 WBC (ref 0–0.2)
OVALOCYTES BLD QL SMEAR: NORMAL
PLATELET # BLD AUTO: 266 K/UL (ref 164–446)
PLATELET BLD QL SMEAR: NORMAL
PMV BLD AUTO: 8.9 FL (ref 9–12.9)
POIKILOCYTOSIS BLD QL SMEAR: NORMAL
POTASSIUM SERPL-SCNC: 5 MMOL/L (ref 3.6–5.5)
PROTHROMBIN TIME: 14 SEC (ref 12–14.6)
RBC # BLD AUTO: 6.11 M/UL (ref 4.7–6.1)
RBC BLD AUTO: PRESENT
RH BLD: NORMAL
SODIUM SERPL-SCNC: 138 MMOL/L (ref 135–145)
WBC # BLD AUTO: 8 K/UL (ref 4.8–10.8)

## 2024-12-12 PROCEDURE — 86901 BLOOD TYPING SEROLOGIC RH(D): CPT

## 2024-12-12 PROCEDURE — 36415 COLL VENOUS BLD VENIPUNCTURE: CPT

## 2024-12-12 PROCEDURE — 86850 RBC ANTIBODY SCREEN: CPT

## 2024-12-12 PROCEDURE — 80048 BASIC METABOLIC PNL TOTAL CA: CPT

## 2024-12-12 PROCEDURE — 71046 X-RAY EXAM CHEST 2 VIEWS: CPT

## 2024-12-12 PROCEDURE — 85730 THROMBOPLASTIN TIME PARTIAL: CPT

## 2024-12-12 PROCEDURE — 85025 COMPLETE CBC W/AUTO DIFF WBC: CPT

## 2024-12-12 PROCEDURE — 85610 PROTHROMBIN TIME: CPT

## 2024-12-12 PROCEDURE — 86900 BLOOD TYPING SEROLOGIC ABO: CPT

## 2024-12-12 NOTE — OR NURSING
In person pre admit appointment completed. Pt states understanding of instructions including to increase oral hydration the day prior to surgery.

## 2024-12-17 ENCOUNTER — ANESTHESIA EVENT (OUTPATIENT)
Dept: SURGERY | Facility: MEDICAL CENTER | Age: 69
DRG: 430 | End: 2024-12-17
Payer: MEDICARE

## 2024-12-17 ENCOUNTER — APPOINTMENT (OUTPATIENT)
Dept: RADIOLOGY | Facility: MEDICAL CENTER | Age: 69
DRG: 430 | End: 2024-12-17
Attending: NEUROLOGICAL SURGERY
Payer: MEDICARE

## 2024-12-17 ENCOUNTER — HOSPITAL ENCOUNTER (INPATIENT)
Facility: MEDICAL CENTER | Age: 69
LOS: 3 days | DRG: 430 | End: 2024-12-20
Attending: NEUROLOGICAL SURGERY | Admitting: NEUROLOGICAL SURGERY
Payer: MEDICARE

## 2024-12-17 ENCOUNTER — ANESTHESIA (OUTPATIENT)
Dept: SURGERY | Facility: MEDICAL CENTER | Age: 69
DRG: 430 | End: 2024-12-17
Payer: MEDICARE

## 2024-12-17 DIAGNOSIS — M48.02 CERVICAL SPINAL STENOSIS: ICD-10-CM

## 2024-12-17 LAB
ABO + RH BLD: NORMAL
GLUCOSE BLD STRIP.AUTO-MCNC: 126 MG/DL (ref 65–99)
GLUCOSE BLD STRIP.AUTO-MCNC: 168 MG/DL (ref 65–99)

## 2024-12-17 PROCEDURE — 700101 HCHG RX REV CODE 250: Performed by: ANESTHESIOLOGY

## 2024-12-17 PROCEDURE — 700102 HCHG RX REV CODE 250 W/ 637 OVERRIDE(OP): Performed by: NEUROLOGICAL SURGERY

## 2024-12-17 PROCEDURE — 00NW0ZZ RELEASE CERVICAL SPINAL CORD, OPEN APPROACH: ICD-10-PCS | Performed by: NEUROLOGICAL SURGERY

## 2024-12-17 PROCEDURE — 160002 HCHG RECOVERY MINUTES (STAT): Performed by: NEUROLOGICAL SURGERY

## 2024-12-17 PROCEDURE — 700102 HCHG RX REV CODE 250 W/ 637 OVERRIDE(OP): Performed by: ANESTHESIOLOGY

## 2024-12-17 PROCEDURE — 0RB30ZZ EXCISION OF CERVICAL VERTEBRAL DISC, OPEN APPROACH: ICD-10-PCS | Performed by: NEUROLOGICAL SURGERY

## 2024-12-17 PROCEDURE — 700111 HCHG RX REV CODE 636 W/ 250 OVERRIDE (IP): Performed by: ANESTHESIOLOGY

## 2024-12-17 PROCEDURE — 700111 HCHG RX REV CODE 636 W/ 250 OVERRIDE (IP)

## 2024-12-17 PROCEDURE — 110371 HCHG SHELL REV 272: Performed by: NEUROLOGICAL SURGERY

## 2024-12-17 PROCEDURE — 160042 HCHG SURGERY MINUTES - EA ADDL 1 MIN LEVEL 5: Performed by: NEUROLOGICAL SURGERY

## 2024-12-17 PROCEDURE — C1713 ANCHOR/SCREW BN/BN,TIS/BN: HCPCS | Performed by: NEUROLOGICAL SURGERY

## 2024-12-17 PROCEDURE — A9270 NON-COVERED ITEM OR SERVICE: HCPCS

## 2024-12-17 PROCEDURE — 160048 HCHG OR STATISTICAL LEVEL 1-5: Performed by: NEUROLOGICAL SURGERY

## 2024-12-17 PROCEDURE — 160009 HCHG ANES TIME/MIN: Performed by: NEUROLOGICAL SURGERY

## 2024-12-17 PROCEDURE — 110454 HCHG SHELL REV 250: Performed by: NEUROLOGICAL SURGERY

## 2024-12-17 PROCEDURE — 95938 SOMATOSENSORY TESTING: CPT | Performed by: NEUROLOGICAL SURGERY

## 2024-12-17 PROCEDURE — 0RG4071 FUSION OF CERVICOTHORACIC VERTEBRAL JOINT WITH AUTOLOGOUS TISSUE SUBSTITUTE, POSTERIOR APPROACH, POSTERIOR COLUMN, OPEN APPROACH: ICD-10-PCS | Performed by: NEUROLOGICAL SURGERY

## 2024-12-17 PROCEDURE — 8E09XBG COMPUTER ASSISTED PROCEDURE OF HEAD AND NECK REGION, WITH COMPUTERIZED TOMOGRAPHY: ICD-10-PCS | Performed by: NEUROLOGICAL SURGERY

## 2024-12-17 PROCEDURE — 36415 COLL VENOUS BLD VENIPUNCTURE: CPT

## 2024-12-17 PROCEDURE — 95940 IONM IN OPERATNG ROOM 15 MIN: CPT | Performed by: NEUROLOGICAL SURGERY

## 2024-12-17 PROCEDURE — 160035 HCHG PACU - 1ST 60 MINS PHASE I: Performed by: NEUROLOGICAL SURGERY

## 2024-12-17 PROCEDURE — 700102 HCHG RX REV CODE 250 W/ 637 OVERRIDE(OP)

## 2024-12-17 PROCEDURE — C1821 INTERSPINOUS IMPLANT: HCPCS | Performed by: NEUROLOGICAL SURGERY

## 2024-12-17 PROCEDURE — 160031 HCHG SURGERY MINUTES - 1ST 30 MINS LEVEL 5: Performed by: NEUROLOGICAL SURGERY

## 2024-12-17 PROCEDURE — 700101 HCHG RX REV CODE 250: Performed by: NEUROLOGICAL SURGERY

## 2024-12-17 PROCEDURE — 72040 X-RAY EXAM NECK SPINE 2-3 VW: CPT

## 2024-12-17 PROCEDURE — A9270 NON-COVERED ITEM OR SERVICE: HCPCS | Performed by: ANESTHESIOLOGY

## 2024-12-17 PROCEDURE — 0RG20A0 FUSION OF 2 OR MORE CERVICAL VERTEBRAL JOINTS WITH INTERBODY FUSION DEVICE, ANTERIOR APPROACH, ANTERIOR COLUMN, OPEN APPROACH: ICD-10-PCS | Performed by: NEUROLOGICAL SURGERY

## 2024-12-17 PROCEDURE — 86900 BLOOD TYPING SEROLOGIC ABO: CPT

## 2024-12-17 PROCEDURE — 502000 HCHG MISC OR IMPLANTS RC 0278: Performed by: NEUROLOGICAL SURGERY

## 2024-12-17 PROCEDURE — 0RG2071 FUSION OF 2 OR MORE CERVICAL VERTEBRAL JOINTS WITH AUTOLOGOUS TISSUE SUBSTITUTE, POSTERIOR APPROACH, POSTERIOR COLUMN, OPEN APPROACH: ICD-10-PCS | Performed by: NEUROLOGICAL SURGERY

## 2024-12-17 PROCEDURE — 99222 1ST HOSP IP/OBS MODERATE 55: CPT | Performed by: STUDENT IN AN ORGANIZED HEALTH CARE EDUCATION/TRAINING PROGRAM

## 2024-12-17 PROCEDURE — 95861 NEEDLE EMG 2 EXTREMITIES: CPT | Performed by: NEUROLOGICAL SURGERY

## 2024-12-17 PROCEDURE — 160036 HCHG PACU - EA ADDL 30 MINS PHASE I: Performed by: NEUROLOGICAL SURGERY

## 2024-12-17 PROCEDURE — 770001 HCHG ROOM/CARE - MED/SURG/GYN PRIV*

## 2024-12-17 PROCEDURE — 3E0U0GB INTRODUCTION OF RECOMBINANT BONE MORPHOGENETIC PROTEIN INTO JOINTS, OPEN APPROACH: ICD-10-PCS | Performed by: NEUROLOGICAL SURGERY

## 2024-12-17 PROCEDURE — 700111 HCHG RX REV CODE 636 W/ 250 OVERRIDE (IP): Performed by: NEUROLOGICAL SURGERY

## 2024-12-17 PROCEDURE — 82962 GLUCOSE BLOOD TEST: CPT

## 2024-12-17 PROCEDURE — 95939 C MOTOR EVOKED UPR&LWR LIMBS: CPT | Performed by: NEUROLOGICAL SURGERY

## 2024-12-17 PROCEDURE — 86901 BLOOD TYPING SEROLOGIC RH(D): CPT

## 2024-12-17 PROCEDURE — A9270 NON-COVERED ITEM OR SERVICE: HCPCS | Performed by: NEUROLOGICAL SURGERY

## 2024-12-17 PROCEDURE — 700105 HCHG RX REV CODE 258: Performed by: ANESTHESIOLOGY

## 2024-12-17 DEVICE — SCREW SET INFINITY (1EA): Type: IMPLANTABLE DEVICE | Site: SPINE CERVICAL | Status: FUNCTIONAL

## 2024-12-17 DEVICE — IMPLANTABLE DEVICE: Type: IMPLANTABLE DEVICE | Site: SPINE CERVICAL | Status: FUNCTIONAL

## 2024-12-17 DEVICE — SCREW BONE INFINITY 3.5MM X 14MM (1EA): Type: IMPLANTABLE DEVICE | Site: SPINE CERVICAL | Status: FUNCTIONAL

## 2024-12-17 DEVICE — SCREW 4.0X17 SELF DRILL VAR (1TX10+2TCX10=30): Type: IMPLANTABLE DEVICE | Site: SPINE CERVICAL | Status: FUNCTIONAL

## 2024-12-17 DEVICE — SCREW 4.0X15 SELF DRILL VAR (1TX12+2TCX12=36): Type: IMPLANTABLE DEVICE | Site: SPINE CERVICAL | Status: FUNCTIONAL

## 2024-12-17 DEVICE — GRAFT BONE MAGNIFUSE 1X10CM: Type: IMPLANTABLE DEVICE | Site: SPINE CERVICAL | Status: FUNCTIONAL

## 2024-12-17 DEVICE — GRAFT BONE KIT INFUSE X-SMALL: Type: IMPLANTABLE DEVICE | Site: SPINE CERVICAL | Status: FUNCTIONAL

## 2024-12-17 DEVICE — GRAFT BONE PASTE GRAFTON PLUS 1CC (1EA): Type: IMPLANTABLE DEVICE | Site: SPINE CERVICAL | Status: FUNCTIONAL

## 2024-12-17 RX ORDER — ACETAMINOPHEN 500 MG
500-1000 TABLET ORAL EVERY 6 HOURS PRN
COMMUNITY

## 2024-12-17 RX ORDER — CEFAZOLIN SODIUM 1 G/3ML
INJECTION, POWDER, FOR SOLUTION INTRAMUSCULAR; INTRAVENOUS
Status: DISCONTINUED | OUTPATIENT
Start: 2024-12-17 | End: 2024-12-17 | Stop reason: HOSPADM

## 2024-12-17 RX ORDER — ONDANSETRON 2 MG/ML
4 INJECTION INTRAMUSCULAR; INTRAVENOUS
Status: DISCONTINUED | OUTPATIENT
Start: 2024-12-17 | End: 2024-12-17

## 2024-12-17 RX ORDER — AMLODIPINE BESYLATE 5 MG/1
7.5 TABLET ORAL DAILY
Status: DISCONTINUED | OUTPATIENT
Start: 2024-12-18 | End: 2024-12-20 | Stop reason: HOSPADM

## 2024-12-17 RX ORDER — CEFAZOLIN SODIUM 1 G/3ML
INJECTION, POWDER, FOR SOLUTION INTRAMUSCULAR; INTRAVENOUS PRN
Status: DISCONTINUED | OUTPATIENT
Start: 2024-12-17 | End: 2024-12-17 | Stop reason: SURG

## 2024-12-17 RX ORDER — SODIUM CHLORIDE 9 MG/ML
INJECTION, SOLUTION INTRAVENOUS
Status: DISCONTINUED | OUTPATIENT
Start: 2024-12-17 | End: 2024-12-17 | Stop reason: SURG

## 2024-12-17 RX ORDER — VALSARTAN 80 MG/1
320 TABLET ORAL DAILY
Status: DISCONTINUED | OUTPATIENT
Start: 2024-12-18 | End: 2024-12-20 | Stop reason: HOSPADM

## 2024-12-17 RX ORDER — ACETAMINOPHEN 500 MG
1000 TABLET ORAL EVERY 6 HOURS
Status: DISCONTINUED | OUTPATIENT
Start: 2024-12-17 | End: 2024-12-20 | Stop reason: HOSPADM

## 2024-12-17 RX ORDER — OXYCODONE HCL 5 MG/5 ML
5 SOLUTION, ORAL ORAL
Status: DISCONTINUED | OUTPATIENT
Start: 2024-12-17 | End: 2024-12-17

## 2024-12-17 RX ORDER — REMIFENTANIL HYDROCHLORIDE 1 MG/ML
INJECTION, POWDER, LYOPHILIZED, FOR SOLUTION INTRAVENOUS
Status: DISCONTINUED | OUTPATIENT
Start: 2024-12-17 | End: 2024-12-17 | Stop reason: SURG

## 2024-12-17 RX ORDER — HYDROMORPHONE HYDROCHLORIDE 1 MG/ML
0.2 INJECTION, SOLUTION INTRAMUSCULAR; INTRAVENOUS; SUBCUTANEOUS
Status: DISCONTINUED | OUTPATIENT
Start: 2024-12-17 | End: 2024-12-17

## 2024-12-17 RX ORDER — SODIUM CHLORIDE, SODIUM GLUCONATE, SODIUM ACETATE, POTASSIUM CHLORIDE AND MAGNESIUM CHLORIDE 526; 502; 368; 37; 30 MG/100ML; MG/100ML; MG/100ML; MG/100ML; MG/100ML
INJECTION, SOLUTION INTRAVENOUS
Status: DISCONTINUED | OUTPATIENT
Start: 2024-12-17 | End: 2024-12-17 | Stop reason: SURG

## 2024-12-17 RX ORDER — HYDRALAZINE HYDROCHLORIDE 20 MG/ML
10 INJECTION INTRAMUSCULAR; INTRAVENOUS
Status: DISCONTINUED | OUTPATIENT
Start: 2024-12-17 | End: 2024-12-20 | Stop reason: HOSPADM

## 2024-12-17 RX ORDER — HYDRALAZINE HYDROCHLORIDE 20 MG/ML
5 INJECTION INTRAMUSCULAR; INTRAVENOUS
Status: DISCONTINUED | OUTPATIENT
Start: 2024-12-17 | End: 2024-12-17

## 2024-12-17 RX ORDER — ACETAMINOPHEN 500 MG
1000 TABLET ORAL ONCE
Status: COMPLETED | OUTPATIENT
Start: 2024-12-17 | End: 2024-12-17

## 2024-12-17 RX ORDER — HYDROMORPHONE HYDROCHLORIDE 1 MG/ML
0.5 INJECTION, SOLUTION INTRAMUSCULAR; INTRAVENOUS; SUBCUTANEOUS
Status: DISCONTINUED | OUTPATIENT
Start: 2024-12-17 | End: 2024-12-17

## 2024-12-17 RX ORDER — DIPHENHYDRAMINE HYDROCHLORIDE 50 MG/ML
12.5 INJECTION INTRAMUSCULAR; INTRAVENOUS
Status: DISCONTINUED | OUTPATIENT
Start: 2024-12-17 | End: 2024-12-17

## 2024-12-17 RX ORDER — CALCIUM CARBONATE 500 MG/1
500 TABLET, CHEWABLE ORAL 3 TIMES DAILY PRN
Status: DISCONTINUED | OUTPATIENT
Start: 2024-12-17 | End: 2024-12-20 | Stop reason: HOSPADM

## 2024-12-17 RX ORDER — METOPROLOL SUCCINATE 50 MG/1
50 TABLET, EXTENDED RELEASE ORAL DAILY
Status: DISCONTINUED | OUTPATIENT
Start: 2024-12-18 | End: 2024-12-20 | Stop reason: HOSPADM

## 2024-12-17 RX ORDER — POLYETHYLENE GLYCOL 3350 17 G/17G
1 POWDER, FOR SOLUTION ORAL 2 TIMES DAILY PRN
Status: DISCONTINUED | OUTPATIENT
Start: 2024-12-17 | End: 2024-12-20 | Stop reason: HOSPADM

## 2024-12-17 RX ORDER — AMOXICILLIN 250 MG
1 CAPSULE ORAL
Status: DISCONTINUED | OUTPATIENT
Start: 2024-12-17 | End: 2024-12-20 | Stop reason: HOSPADM

## 2024-12-17 RX ORDER — AMLODIPINE BESYLATE 2.5 MG/1
2.5 TABLET ORAL DAILY
Status: DISCONTINUED | OUTPATIENT
Start: 2024-12-18 | End: 2024-12-17

## 2024-12-17 RX ORDER — METHOCARBAMOL 750 MG/1
750 TABLET, FILM COATED ORAL EVERY 8 HOURS
Status: DISCONTINUED | OUTPATIENT
Start: 2024-12-18 | End: 2024-12-20

## 2024-12-17 RX ORDER — OXYCODONE HCL 5 MG/5 ML
10 SOLUTION, ORAL ORAL
Status: DISCONTINUED | OUTPATIENT
Start: 2024-12-17 | End: 2024-12-17

## 2024-12-17 RX ORDER — SODIUM CHLORIDE, SODIUM LACTATE, POTASSIUM CHLORIDE, CALCIUM CHLORIDE 600; 310; 30; 20 MG/100ML; MG/100ML; MG/100ML; MG/100ML
INJECTION, SOLUTION INTRAVENOUS CONTINUOUS
Status: DISCONTINUED | OUTPATIENT
Start: 2024-12-17 | End: 2024-12-17

## 2024-12-17 RX ORDER — DOCUSATE SODIUM 100 MG/1
100 CAPSULE, LIQUID FILLED ORAL 2 TIMES DAILY
Status: DISCONTINUED | OUTPATIENT
Start: 2024-12-17 | End: 2024-12-20 | Stop reason: HOSPADM

## 2024-12-17 RX ORDER — LIDOCAINE HYDROCHLORIDE 20 MG/ML
INJECTION, SOLUTION EPIDURAL; INFILTRATION; INTRACAUDAL; PERINEURAL PRN
Status: DISCONTINUED | OUTPATIENT
Start: 2024-12-17 | End: 2024-12-17 | Stop reason: SURG

## 2024-12-17 RX ORDER — GABAPENTIN 300 MG/1
300 CAPSULE ORAL 3 TIMES DAILY
Status: DISCONTINUED | OUTPATIENT
Start: 2024-12-17 | End: 2024-12-20 | Stop reason: HOSPADM

## 2024-12-17 RX ORDER — ONDANSETRON 4 MG/1
4 TABLET, ORALLY DISINTEGRATING ORAL EVERY 4 HOURS PRN
Status: DISCONTINUED | OUTPATIENT
Start: 2024-12-17 | End: 2024-12-20 | Stop reason: HOSPADM

## 2024-12-17 RX ORDER — MAGNESIUM SULFATE HEPTAHYDRATE 40 MG/ML
INJECTION, SOLUTION INTRAVENOUS PRN
Status: DISCONTINUED | OUTPATIENT
Start: 2024-12-17 | End: 2024-12-17 | Stop reason: SURG

## 2024-12-17 RX ORDER — DIPHENHYDRAMINE HCL 25 MG
25 TABLET ORAL EVERY 6 HOURS PRN
Status: DISCONTINUED | OUTPATIENT
Start: 2024-12-17 | End: 2024-12-20 | Stop reason: HOSPADM

## 2024-12-17 RX ORDER — METHADONE HYDROCHLORIDE 10 MG/ML
INJECTION, SOLUTION INTRAMUSCULAR; INTRAVENOUS; SUBCUTANEOUS PRN
Status: DISCONTINUED | OUTPATIENT
Start: 2024-12-17 | End: 2024-12-17 | Stop reason: SURG

## 2024-12-17 RX ORDER — ROCURONIUM BROMIDE 10 MG/ML
INJECTION, SOLUTION INTRAVENOUS PRN
Status: DISCONTINUED | OUTPATIENT
Start: 2024-12-17 | End: 2024-12-17 | Stop reason: SURG

## 2024-12-17 RX ORDER — ONDANSETRON 2 MG/ML
4 INJECTION INTRAMUSCULAR; INTRAVENOUS EVERY 4 HOURS PRN
Status: DISCONTINUED | OUTPATIENT
Start: 2024-12-17 | End: 2024-12-20 | Stop reason: HOSPADM

## 2024-12-17 RX ORDER — DEXAMETHASONE SODIUM PHOSPHATE 4 MG/ML
INJECTION, SOLUTION INTRA-ARTICULAR; INTRALESIONAL; INTRAMUSCULAR; INTRAVENOUS; SOFT TISSUE PRN
Status: DISCONTINUED | OUTPATIENT
Start: 2024-12-17 | End: 2024-12-17 | Stop reason: SURG

## 2024-12-17 RX ORDER — DIPHENHYDRAMINE HYDROCHLORIDE 50 MG/ML
25 INJECTION INTRAMUSCULAR; INTRAVENOUS EVERY 6 HOURS PRN
Status: DISCONTINUED | OUTPATIENT
Start: 2024-12-17 | End: 2024-12-20 | Stop reason: HOSPADM

## 2024-12-17 RX ORDER — BISACODYL 10 MG
10 SUPPOSITORY, RECTAL RECTAL
Status: DISCONTINUED | OUTPATIENT
Start: 2024-12-17 | End: 2024-12-20 | Stop reason: HOSPADM

## 2024-12-17 RX ORDER — SODIUM CHLORIDE, SODIUM LACTATE, POTASSIUM CHLORIDE, CALCIUM CHLORIDE 600; 310; 30; 20 MG/100ML; MG/100ML; MG/100ML; MG/100ML
INJECTION, SOLUTION INTRAVENOUS
Status: DISCONTINUED | OUTPATIENT
Start: 2024-12-17 | End: 2024-12-17 | Stop reason: SURG

## 2024-12-17 RX ORDER — ONDANSETRON 2 MG/ML
INJECTION INTRAMUSCULAR; INTRAVENOUS PRN
Status: DISCONTINUED | OUTPATIENT
Start: 2024-12-17 | End: 2024-12-17 | Stop reason: SURG

## 2024-12-17 RX ORDER — MAGNESIUM HYDROXIDE 1200 MG/15ML
LIQUID ORAL
Status: COMPLETED | OUTPATIENT
Start: 2024-12-17 | End: 2024-12-17

## 2024-12-17 RX ORDER — BACITRACIN ZINC 500 [USP'U]/G
OINTMENT TOPICAL
Status: DISCONTINUED | OUTPATIENT
Start: 2024-12-17 | End: 2024-12-17 | Stop reason: HOSPADM

## 2024-12-17 RX ORDER — CLONIDINE HYDROCHLORIDE 0.1 MG/1
0.1 TABLET ORAL EVERY 4 HOURS PRN
Status: DISCONTINUED | OUTPATIENT
Start: 2024-12-17 | End: 2024-12-20 | Stop reason: HOSPADM

## 2024-12-17 RX ORDER — METHOCARBAMOL 100 MG/ML
1000 INJECTION, SOLUTION INTRAMUSCULAR; INTRAVENOUS
Status: COMPLETED | OUTPATIENT
Start: 2024-12-17 | End: 2024-12-17

## 2024-12-17 RX ORDER — HYDROMORPHONE HYDROCHLORIDE 1 MG/ML
0.4 INJECTION, SOLUTION INTRAMUSCULAR; INTRAVENOUS; SUBCUTANEOUS
Status: DISCONTINUED | OUTPATIENT
Start: 2024-12-17 | End: 2024-12-17

## 2024-12-17 RX ORDER — AMOXICILLIN 250 MG
1 CAPSULE ORAL NIGHTLY
Status: DISCONTINUED | OUTPATIENT
Start: 2024-12-17 | End: 2024-12-20 | Stop reason: HOSPADM

## 2024-12-17 RX ORDER — BUPIVACAINE HYDROCHLORIDE AND EPINEPHRINE 5; 5 MG/ML; UG/ML
INJECTION, SOLUTION PERINEURAL
Status: DISCONTINUED | OUTPATIENT
Start: 2024-12-17 | End: 2024-12-17 | Stop reason: HOSPADM

## 2024-12-17 RX ORDER — MIDAZOLAM HYDROCHLORIDE 1 MG/ML
INJECTION INTRAMUSCULAR; INTRAVENOUS PRN
Status: DISCONTINUED | OUTPATIENT
Start: 2024-12-17 | End: 2024-12-17 | Stop reason: SURG

## 2024-12-17 RX ORDER — ALBUTEROL SULFATE 5 MG/ML
2.5 SOLUTION RESPIRATORY (INHALATION)
Status: DISCONTINUED | OUTPATIENT
Start: 2024-12-17 | End: 2024-12-17

## 2024-12-17 RX ORDER — LABETALOL HYDROCHLORIDE 5 MG/ML
10 INJECTION, SOLUTION INTRAVENOUS
Status: DISCONTINUED | OUTPATIENT
Start: 2024-12-17 | End: 2024-12-20 | Stop reason: HOSPADM

## 2024-12-17 RX ORDER — EPHEDRINE SULFATE 50 MG/ML
INJECTION, SOLUTION INTRAVENOUS PRN
Status: DISCONTINUED | OUTPATIENT
Start: 2024-12-17 | End: 2024-12-17 | Stop reason: SURG

## 2024-12-17 RX ORDER — ACETAMINOPHEN 500 MG
1000 TABLET ORAL EVERY 6 HOURS PRN
Status: DISCONTINUED | OUTPATIENT
Start: 2024-12-23 | End: 2024-12-20 | Stop reason: HOSPADM

## 2024-12-17 RX ORDER — HALOPERIDOL 5 MG/ML
1 INJECTION INTRAMUSCULAR
Status: DISCONTINUED | OUTPATIENT
Start: 2024-12-17 | End: 2024-12-17

## 2024-12-17 RX ADMIN — DEXAMETHASONE SODIUM PHOSPHATE 4 MG: 4 INJECTION INTRA-ARTICULAR; INTRALESIONAL; INTRAMUSCULAR; INTRAVENOUS; SOFT TISSUE at 17:30

## 2024-12-17 RX ADMIN — MAGNESIUM SULFATE HEPTAHYDRATE 2 G: 2 INJECTION, SOLUTION INTRAVENOUS at 14:52

## 2024-12-17 RX ADMIN — CEFAZOLIN 2 G: 1 INJECTION, POWDER, FOR SOLUTION INTRAMUSCULAR; INTRAVENOUS at 10:46

## 2024-12-17 RX ADMIN — NOREPINEPHRINE BITARTRATE 0.1 MCG/KG/MIN: 1 INJECTION, SOLUTION, CONCENTRATE INTRAVENOUS at 11:00

## 2024-12-17 RX ADMIN — OMEPRAZOLE 20 MG: 20 CAPSULE, DELAYED RELEASE ORAL at 23:22

## 2024-12-17 RX ADMIN — SODIUM CHLORIDE: 9 INJECTION, SOLUTION INTRAVENOUS at 10:46

## 2024-12-17 RX ADMIN — FENTANYL CITRATE 25 MCG: 50 INJECTION, SOLUTION INTRAMUSCULAR; INTRAVENOUS at 20:20

## 2024-12-17 RX ADMIN — FENTANYL CITRATE 50 MCG: 50 INJECTION, SOLUTION INTRAMUSCULAR; INTRAVENOUS at 19:45

## 2024-12-17 RX ADMIN — MAGNESIUM SULFATE HEPTAHYDRATE 2 G: 2 INJECTION, SOLUTION INTRAVENOUS at 11:40

## 2024-12-17 RX ADMIN — SODIUM CHLORIDE, SODIUM GLUCONATE, SODIUM ACETATE, POTASSIUM CHLORIDE AND MAGNESIUM CHLORIDE: 526; 502; 368; 37; 30 INJECTION, SOLUTION INTRAVENOUS at 13:20

## 2024-12-17 RX ADMIN — MIDAZOLAM HYDROCHLORIDE 2 MG: 1 INJECTION, SOLUTION INTRAMUSCULAR; INTRAVENOUS at 10:41

## 2024-12-17 RX ADMIN — SODIUM CHLORIDE: 9 INJECTION, SOLUTION INTRAVENOUS at 18:07

## 2024-12-17 RX ADMIN — METHADONE HYDROCHLORIDE 20 MG: 10 INJECTION, SOLUTION INTRAMUSCULAR; INTRAVENOUS; SUBCUTANEOUS at 10:46

## 2024-12-17 RX ADMIN — DEXAMETHASONE SODIUM PHOSPHATE 8 MG: 4 INJECTION INTRA-ARTICULAR; INTRALESIONAL; INTRAMUSCULAR; INTRAVENOUS; SOFT TISSUE at 10:55

## 2024-12-17 RX ADMIN — SENNOSIDES AND DOCUSATE SODIUM 1 TABLET: 50; 8.6 TABLET ORAL at 23:22

## 2024-12-17 RX ADMIN — ACETAMINOPHEN 1000 MG: 500 TABLET ORAL at 09:02

## 2024-12-17 RX ADMIN — PROPOFOL 180 MCG/KG/MIN: 10 INJECTION, EMULSION INTRAVENOUS at 10:47

## 2024-12-17 RX ADMIN — ACETAMINOPHEN 1000 MG: 500 TABLET ORAL at 23:22

## 2024-12-17 RX ADMIN — EPHEDRINE SULFATE 10 MG: 50 INJECTION, SOLUTION INTRAVENOUS at 18:06

## 2024-12-17 RX ADMIN — FENTANYL CITRATE 50 MCG: 50 INJECTION, SOLUTION INTRAMUSCULAR; INTRAVENOUS at 22:08

## 2024-12-17 RX ADMIN — SUGAMMADEX 200 MG: 100 INJECTION, SOLUTION INTRAVENOUS at 10:57

## 2024-12-17 RX ADMIN — CEFAZOLIN 2 G: 1 INJECTION, POWDER, FOR SOLUTION INTRAMUSCULAR; INTRAVENOUS at 18:06

## 2024-12-17 RX ADMIN — PROPOFOL 200 MG: 10 INJECTION, EMULSION INTRAVENOUS at 10:46

## 2024-12-17 RX ADMIN — Medication: at 23:49

## 2024-12-17 RX ADMIN — LIDOCAINE HYDROCHLORIDE 100 MG: 20 INJECTION, SOLUTION EPIDURAL; INFILTRATION; INTRACAUDAL; PERINEURAL at 10:46

## 2024-12-17 RX ADMIN — ONDANSETRON 4 MG: 2 INJECTION INTRAMUSCULAR; INTRAVENOUS at 17:46

## 2024-12-17 RX ADMIN — CEFAZOLIN 2 G: 1 INJECTION, POWDER, FOR SOLUTION INTRAMUSCULAR; INTRAVENOUS at 14:23

## 2024-12-17 RX ADMIN — FENTANYL CITRATE 25 MCG: 50 INJECTION, SOLUTION INTRAMUSCULAR; INTRAVENOUS at 21:20

## 2024-12-17 RX ADMIN — SODIUM CHLORIDE, POTASSIUM CHLORIDE, SODIUM LACTATE AND CALCIUM CHLORIDE: 600; 310; 30; 20 INJECTION, SOLUTION INTRAVENOUS at 10:41

## 2024-12-17 RX ADMIN — REMIFENTANIL HYDROCHLORIDE 0.25 MCG/KG/MIN: 1 INJECTION, POWDER, LYOPHILIZED, FOR SOLUTION INTRAVENOUS at 10:46

## 2024-12-17 RX ADMIN — METHOCARBAMOL 1000 MG: 100 INJECTION, SOLUTION INTRAMUSCULAR; INTRAVENOUS at 19:56

## 2024-12-17 RX ADMIN — SODIUM CHLORIDE, SODIUM GLUCONATE, SODIUM ACETATE, POTASSIUM CHLORIDE AND MAGNESIUM CHLORIDE: 526; 502; 368; 37; 30 INJECTION, SOLUTION INTRAVENOUS at 16:13

## 2024-12-17 RX ADMIN — ROCURONIUM BROMIDE 50 MG: 50 INJECTION, SOLUTION INTRAVENOUS at 10:46

## 2024-12-17 RX ADMIN — DOCUSATE SODIUM 100 MG: 100 CAPSULE, LIQUID FILLED ORAL at 23:22

## 2024-12-17 ASSESSMENT — ENCOUNTER SYMPTOMS
VOMITING: 0
FEVER: 0
NAUSEA: 0
NECK PAIN: 0
DOUBLE VISION: 0
COUGH: 0
WHEEZING: 0
SHORTNESS OF BREATH: 0
HEADACHES: 0
EYE PAIN: 0
CHILLS: 0
DIARRHEA: 0
BLURRED VISION: 0
BLOOD IN STOOL: 0
CONSTIPATION: 0
PALPITATIONS: 0
DIZZINESS: 0
ABDOMINAL PAIN: 0

## 2024-12-17 ASSESSMENT — FIBROSIS 4 INDEX
FIB4 SCORE: 1.45
FIB4 SCORE: 1.45

## 2024-12-17 ASSESSMENT — PAIN DESCRIPTION - PAIN TYPE
TYPE: SURGICAL PAIN;CHRONIC PAIN
TYPE: SURGICAL PAIN

## 2024-12-17 NOTE — ANESTHESIA PROCEDURE NOTES
Arterial Line    Performed by: De Alarcon M.D.  Authorized by: De Alarcon M.D.    Start Time:  12/17/2024 10:51 AM  End Time:  12/17/2024 10:53 AM  Localization: surface landmarks    Patient Location:  OR  Indication: continuous blood pressure monitoring        Catheter Size:  20 G  Seldinger Technique?: Yes    Laterality:  Left  Site:  Radial artery  Line Secured:  Antimicrobial disc, tape and transparent dressing  Events: patient tolerated procedure well with no complications

## 2024-12-17 NOTE — PROGRESS NOTES
Aspen universal size fit rigid cervical collar [facility] control desk for patients staff to apply and fit to patient when ready.

## 2024-12-17 NOTE — PROGRESS NOTES
Medication history reviewed with PT at bedside    [facility] rec is complete per PT reporting    Allergies reviewed.     Patient denies any outpatient antibiotics in the last 30 days.     Patient is not taking anticoagulants.    Preferred pharmacy for this visit - [facility] on [address] ([phone])

## 2024-12-17 NOTE — ANESTHESIA PREPROCEDURE EVALUATION
Case: [id number] Date/Time: 12/17/24 1045    Procedures:       ANTERIOR C5 AND C6 CORPECTOMY, AND POSTERIOR C3-T1 INSTRUMENTED FUSION WITH OARM AND C4-7 LAMINECTOMY      DISCECTOMY, SPINE, CERVICAL, ANTERIOR APPROACH, WITH FUSION      CORPECTOMY, SPINE    Pre-op diagnosis: SPINAL STENOSIS IN CERVICAL REGION WITH MYELOPATHY    Location: [facility] OR 04 / SURGERY [facility]    Surgeons: Migdalia Merritt M.D.            Relevant Problems   ANESTHESIA   (positive) Obstructive sleep apnea syndrome      CARDIAC   (positive) Ascending aorta dilation (HCC)   (positive) Essential hypertension      GI   (positive) Gastroesophageal reflux disease without esophagitis         (positive) Fatty liver   (positive) Kidney stone      ENDO   (positive) Type 2 diabetes mellitus with microalbuminuria, without long-term current use of insulin (HCC)       Physical Exam    Airway   Mallampati: II  TM distance: >3 FB  Neck ROM: full       Cardiovascular - normal exam  Rhythm: regular  Rate: normal  (-) murmur     Dental - normal exam           Pulmonary - normal exam  Breath sounds clear to auscultation     Abdominal    Neurological - normal exam                   Anesthesia Plan    ASA 2       Plan - general       Airway plan will be ETT          Induction: intravenous    Postoperative Plan: Postoperative administration of opioids is intended.    Pertinent diagnostic labs and testing reviewed    Informed Consent:    Anesthetic plan and risks discussed with patient.    Use of blood products discussed with: patient whom consented to blood products.

## 2024-12-17 NOTE — ANESTHESIA PROCEDURE NOTES
Airway    Date/Time: 12/17/2024 10:48 AM    Performed by: De Alarcon M.D.  Authorized by: De Alarcon M.D.    Location:  OR  Urgency:  Elective  Difficult Airway: No    Indications for Airway Management:  Anesthesia      Spontaneous Ventilation: absent    Sedation Level:  Deep  Preoxygenated: Yes    Patient Position:  Sniffing  Mask Difficulty Assessment:  2 - vent by mask + OA or adjuvant +/- NMBA  Final Airway Type:  Endotracheal airway  Final Endotracheal Airway:  ETT  Cuffed: Yes    Technique Used for Successful ETT Placement:  Direct laryngoscopy  Devices/Methods Used in Placement:  Anterior pressure/BURP    Insertion Site:  Oral  Blade Type:  Villatoro  Laryngoscope Blade/Videolaryngoscope Blade Size:  2  ETT Size (mm):  7.0  Measured from:  Lips  ETT to Lips (cm):  24  Placement Verified by: auscultation and capnometry    Cormack-Lehane Classification:  Grade IIa - partial view of glottis  Number of Attempts at Approach:  1

## 2024-12-18 PROBLEM — Z98.890 POSTOPERATIVE HYPOXIA: Status: ACTIVE | Noted: 2024-12-18

## 2024-12-18 PROBLEM — R09.02 POSTOPERATIVE HYPOXIA: Status: ACTIVE | Noted: 2024-12-18

## 2024-12-18 LAB
ANION GAP SERPL CALC-SCNC: 12 MMOL/L (ref 7–16)
BUN SERPL-MCNC: 16 MG/DL (ref 8–22)
CALCIUM SERPL-MCNC: 7.7 MG/DL (ref 8.5–10.5)
CHLORIDE SERPL-SCNC: 105 MMOL/L (ref 96–112)
CO2 SERPL-SCNC: 20 MMOL/L (ref 20–33)
CREAT SERPL-MCNC: 0.75 MG/DL (ref 0.5–1.4)
ERYTHROCYTE [DISTWIDTH] IN BLOOD BY AUTOMATED COUNT: 45.1 FL (ref 35.9–50)
GFR SERPLBLD CREATININE-BSD FMLA CKD-EPI: 96 ML/MIN/1.73 M 2
GLUCOSE BLD STRIP.AUTO-MCNC: 106 MG/DL (ref 65–99)
GLUCOSE BLD STRIP.AUTO-MCNC: 116 MG/DL (ref 65–99)
GLUCOSE BLD STRIP.AUTO-MCNC: 126 MG/DL (ref 65–99)
GLUCOSE BLD STRIP.AUTO-MCNC: 141 MG/DL (ref 65–99)
GLUCOSE BLD STRIP.AUTO-MCNC: 153 MG/DL (ref 65–99)
GLUCOSE SERPL-MCNC: 159 MG/DL (ref 65–99)
HCT VFR BLD AUTO: 33.8 % (ref 42–52)
HGB BLD-MCNC: 10.1 G/DL (ref 14–18)
MCH RBC QN AUTO: 20.6 PG (ref 27–33)
MCHC RBC AUTO-ENTMCNC: 29.9 G/DL (ref 32.3–36.5)
MCV RBC AUTO: 69 FL (ref 81.4–97.8)
PLATELET # BLD AUTO: 219 K/UL (ref 164–446)
PMV BLD AUTO: 9.5 FL (ref 9–12.9)
POTASSIUM SERPL-SCNC: 4.6 MMOL/L (ref 3.6–5.5)
RBC # BLD AUTO: 4.9 M/UL (ref 4.7–6.1)
SODIUM SERPL-SCNC: 137 MMOL/L (ref 135–145)
WBC # BLD AUTO: 15.3 K/UL (ref 4.8–10.8)

## 2024-12-18 PROCEDURE — 700105 HCHG RX REV CODE 258

## 2024-12-18 PROCEDURE — 36415 COLL VENOUS BLD VENIPUNCTURE: CPT

## 2024-12-18 PROCEDURE — 90662 IIV NO PRSV INCREASED AG IM: CPT | Performed by: NEUROLOGICAL SURGERY

## 2024-12-18 PROCEDURE — 92610 EVALUATE SWALLOWING FUNCTION: CPT

## 2024-12-18 PROCEDURE — 3E02340 INTRODUCTION OF INFLUENZA VACCINE INTO MUSCLE, PERCUTANEOUS APPROACH: ICD-10-PCS | Performed by: NEUROLOGICAL SURGERY

## 2024-12-18 PROCEDURE — 700102 HCHG RX REV CODE 250 W/ 637 OVERRIDE(OP)

## 2024-12-18 PROCEDURE — 770001 HCHG ROOM/CARE - MED/SURG/GYN PRIV*

## 2024-12-18 PROCEDURE — A9270 NON-COVERED ITEM OR SERVICE: HCPCS

## 2024-12-18 PROCEDURE — 97161 PT EVAL LOW COMPLEX 20 MIN: CPT

## 2024-12-18 PROCEDURE — 97530 THERAPEUTIC ACTIVITIES: CPT

## 2024-12-18 PROCEDURE — 80048 BASIC METABOLIC PNL TOTAL CA: CPT

## 2024-12-18 PROCEDURE — 85027 COMPLETE CBC AUTOMATED: CPT

## 2024-12-18 PROCEDURE — 700111 HCHG RX REV CODE 636 W/ 250 OVERRIDE (IP): Performed by: NEUROLOGICAL SURGERY

## 2024-12-18 PROCEDURE — 700111 HCHG RX REV CODE 636 W/ 250 OVERRIDE (IP)

## 2024-12-18 PROCEDURE — 90471 IMMUNIZATION ADMIN: CPT

## 2024-12-18 PROCEDURE — 82962 GLUCOSE BLOOD TEST: CPT | Mod: 91

## 2024-12-18 RX ORDER — DEXTROSE MONOHYDRATE 25 G/50ML
25 INJECTION, SOLUTION INTRAVENOUS
Status: DISCONTINUED | OUTPATIENT
Start: 2024-12-18 | End: 2024-12-18

## 2024-12-18 RX ORDER — INSULIN LISPRO 100 [IU]/ML
1-6 INJECTION, SOLUTION INTRAVENOUS; SUBCUTANEOUS EVERY 6 HOURS
Status: DISCONTINUED | OUTPATIENT
Start: 2024-12-18 | End: 2024-12-18

## 2024-12-18 RX ORDER — INSULIN LISPRO 100 [IU]/ML
1-6 INJECTION, SOLUTION INTRAVENOUS; SUBCUTANEOUS
Status: DISCONTINUED | OUTPATIENT
Start: 2024-12-19 | End: 2024-12-20 | Stop reason: HOSPADM

## 2024-12-18 RX ORDER — DEXTROSE MONOHYDRATE 25 G/50ML
25 INJECTION, SOLUTION INTRAVENOUS
Status: DISCONTINUED | OUTPATIENT
Start: 2024-12-18 | End: 2024-12-20 | Stop reason: HOSPADM

## 2024-12-18 RX ORDER — MORPHINE SULFATE 4 MG/ML
2 INJECTION INTRAVENOUS
Status: DISCONTINUED | OUTPATIENT
Start: 2024-12-18 | End: 2024-12-20 | Stop reason: HOSPADM

## 2024-12-18 RX ORDER — OXYCODONE HYDROCHLORIDE 10 MG/1
10 TABLET ORAL
Status: DISCONTINUED | OUTPATIENT
Start: 2024-12-18 | End: 2024-12-20 | Stop reason: HOSPADM

## 2024-12-18 RX ORDER — OXYCODONE HYDROCHLORIDE 5 MG/1
5 TABLET ORAL
Status: DISCONTINUED | OUTPATIENT
Start: 2024-12-18 | End: 2024-12-20 | Stop reason: HOSPADM

## 2024-12-18 RX ADMIN — METHOCARBAMOL TABLETS 750 MG: 750 TABLET, COATED ORAL at 06:14

## 2024-12-18 RX ADMIN — DOCUSATE SODIUM 100 MG: 100 CAPSULE, LIQUID FILLED ORAL at 06:14

## 2024-12-18 RX ADMIN — ACETAMINOPHEN 1000 MG: 500 TABLET ORAL at 22:47

## 2024-12-18 RX ADMIN — ACETAMINOPHEN 1000 MG: 500 TABLET ORAL at 16:52

## 2024-12-18 RX ADMIN — ACETAMINOPHEN 1000 MG: 500 TABLET ORAL at 11:47

## 2024-12-18 RX ADMIN — SENNOSIDES AND DOCUSATE SODIUM 1 TABLET: 50; 8.6 TABLET ORAL at 20:35

## 2024-12-18 RX ADMIN — METHOCARBAMOL TABLETS 750 MG: 750 TABLET, COATED ORAL at 13:03

## 2024-12-18 RX ADMIN — CEFAZOLIN 2 G: 2 INJECTION, POWDER, FOR SOLUTION INTRAMUSCULAR; INTRAVENOUS at 01:49

## 2024-12-18 RX ADMIN — SERTRALINE HYDROCHLORIDE 50 MG: 50 TABLET ORAL at 06:18

## 2024-12-18 RX ADMIN — GABAPENTIN 300 MG: 300 CAPSULE ORAL at 11:47

## 2024-12-18 RX ADMIN — INFLUENZA A VIRUS A/VICTORIA/4897/2022 IVR-238 (H1N1) ANTIGEN (FORMALDEHYDE INACTIVATED), INFLUENZA A VIRUS A/CALIFORNIA/122/2022 SAN-022 (H3N2) ANTIGEN (FORMALDEHYDE INACTIVATED), AND INFLUENZA B VIRUS B/MICHIGAN/01/2021 ANTIGEN (FORMALDEHYDE INACTIVATED) 0.5 ML: 60; 60; 60 INJECTION, SUSPENSION INTRAMUSCULAR at 15:47

## 2024-12-18 RX ADMIN — VALSARTAN 320 MG: 80 TABLET ORAL at 06:19

## 2024-12-18 RX ADMIN — GABAPENTIN 300 MG: 300 CAPSULE ORAL at 06:15

## 2024-12-18 RX ADMIN — OMEPRAZOLE 20 MG: 20 CAPSULE, DELAYED RELEASE ORAL at 16:52

## 2024-12-18 RX ADMIN — OMEPRAZOLE 20 MG: 20 CAPSULE, DELAYED RELEASE ORAL at 06:18

## 2024-12-18 RX ADMIN — METOPROLOL SUCCINATE 50 MG: 50 TABLET, EXTENDED RELEASE ORAL at 06:17

## 2024-12-18 RX ADMIN — GABAPENTIN 300 MG: 300 CAPSULE ORAL at 16:52

## 2024-12-18 RX ADMIN — DOCUSATE SODIUM 100 MG: 100 CAPSULE, LIQUID FILLED ORAL at 16:52

## 2024-12-18 RX ADMIN — METHOCARBAMOL TABLETS 750 MG: 750 TABLET, COATED ORAL at 20:35

## 2024-12-18 RX ADMIN — CEFAZOLIN 2 G: 2 INJECTION, POWDER, FOR SOLUTION INTRAMUSCULAR; INTRAVENOUS at 10:15

## 2024-12-18 RX ADMIN — AMLODIPINE BESYLATE 7.5 MG: 5 TABLET ORAL at 06:12

## 2024-12-18 ASSESSMENT — GAIT ASSESSMENTS
DISTANCE (FEET): 100
DEVIATION: DECREASED HEEL STRIKE;DECREASED TOE OFF;BRADYKINETIC
ASSISTIVE DEVICE: FRONT WHEEL WALKER
GAIT LEVEL OF ASSIST: STANDBY ASSIST

## 2024-12-18 ASSESSMENT — COGNITIVE AND FUNCTIONAL STATUS - GENERAL
SUGGESTED CMS G CODE MODIFIER DAILY ACTIVITY: CK
MOVING TO AND FROM BED TO CHAIR: A LITTLE
SUGGESTED CMS G CODE MODIFIER MOBILITY: CK
DAILY ACTIVITIY SCORE: 19
TURNING FROM BACK TO SIDE WHILE IN FLAT BAD: A LITTLE
STANDING UP FROM CHAIR USING ARMS: A LITTLE
MOVING FROM LYING ON BACK TO SITTING ON SIDE OF FLAT BED: A LITTLE
STANDING UP FROM CHAIR USING ARMS: A LITTLE
DRESSING REGULAR UPPER BODY CLOTHING: A LITTLE
TOILETING: A LITTLE
MOVING FROM LYING ON BACK TO SITTING ON SIDE OF FLAT BED: A LITTLE
WALKING IN HOSPITAL ROOM: A LITTLE
MOBILITY SCORE: 18
WALKING IN HOSPITAL ROOM: A LITTLE
HELP NEEDED FOR BATHING: A LITTLE
SUGGESTED CMS G CODE MODIFIER MOBILITY: CK
TURNING FROM BACK TO SIDE WHILE IN FLAT BAD: A LITTLE
MOBILITY SCORE: 18
PERSONAL GROOMING: A LITTLE
CLIMB 3 TO 5 STEPS WITH RAILING: A LITTLE
DRESSING REGULAR LOWER BODY CLOTHING: A LITTLE
MOVING TO AND FROM BED TO CHAIR: A LITTLE
CLIMB 3 TO 5 STEPS WITH RAILING: A LITTLE

## 2024-12-18 ASSESSMENT — LIFESTYLE VARIABLES
TOTAL SCORE: 0
ON A TYPICAL DAY WHEN YOU DRINK ALCOHOL HOW MANY DRINKS DO YOU HAVE: 0
DOES PATIENT WANT TO STOP DRINKING: NO
AVERAGE NUMBER OF DAYS PER WEEK YOU HAVE A DRINK CONTAINING ALCOHOL: 0
TOTAL SCORE: 0
HOW MANY TIMES IN THE PAST YEAR HAVE YOU HAD 5 OR MORE DRINKS IN A DAY: 0
EVER HAD A DRINK FIRST THING IN THE MORNING TO STEADY YOUR NERVES TO GET RID OF A HANGOVER: NO
HAVE YOU EVER FELT YOU SHOULD CUT DOWN ON YOUR DRINKING: NO
TOTAL SCORE: 0
CONSUMPTION TOTAL: NEGATIVE
HAVE PEOPLE ANNOYED YOU BY CRITICIZING YOUR DRINKING: NO
ALCOHOL_USE: NO
EVER FELT BAD OR GUILTY ABOUT YOUR DRINKING: NO

## 2024-12-18 ASSESSMENT — SOCIAL DETERMINANTS OF HEALTH (SDOH)
IN THE PAST 12 MONTHS, HAS THE ELECTRIC, GAS, OIL, OR WATER COMPANY THREATENED TO SHUT OFF SERVICE IN YOUR HOME?: NO
WITHIN THE LAST YEAR, HAVE YOU BEEN HUMILIATED OR EMOTIONALLY ABUSED IN OTHER WAYS BY YOUR PARTNER OR EX-PARTNER?: NO
WITHIN THE LAST YEAR, HAVE YOU BEEN KICKED, HIT, SLAPPED, OR OTHERWISE PHYSICALLY HURT BY YOUR PARTNER OR EX-PARTNER?: NO
WITHIN THE PAST 12 MONTHS, YOU WORRIED THAT YOUR FOOD WOULD RUN OUT BEFORE YOU GOT THE MONEY TO BUY MORE: NEVER TRUE
WITHIN THE LAST YEAR, HAVE TO BEEN RAPED OR FORCED TO HAVE ANY KIND OF SEXUAL ACTIVITY BY YOUR PARTNER OR EX-PARTNER?: NO
WITHIN THE PAST 12 MONTHS, THE FOOD YOU BOUGHT JUST DIDN'T LAST AND YOU DIDN'T HAVE MONEY TO GET MORE: NEVER TRUE
WITHIN THE LAST YEAR, HAVE YOU BEEN AFRAID OF YOUR PARTNER OR EX-PARTNER?: NO

## 2024-12-18 ASSESSMENT — PAIN DESCRIPTION - PAIN TYPE
TYPE: SURGICAL PAIN
TYPE: SURGICAL PAIN
TYPE: ACUTE PAIN;SURGICAL PAIN
TYPE: SURGICAL PAIN
TYPE: ACUTE PAIN;CHRONIC PAIN

## 2024-12-18 ASSESSMENT — PATIENT HEALTH QUESTIONNAIRE - PHQ9
SUM OF ALL RESPONSES TO PHQ9 QUESTIONS 1 AND 2: 0
2. FEELING DOWN, DEPRESSED, IRRITABLE, OR HOPELESS: NOT AT ALL
1. LITTLE INTEREST OR PLEASURE IN DOING THINGS: NOT AT ALL

## 2024-12-18 ASSESSMENT — COPD QUESTIONNAIRES
DURING THE PAST 4 WEEKS HOW MUCH DID YOU FEEL SHORT OF BREATH: SOME OF THE TIME
DO YOU EVER COUGH UP ANY MUCUS OR PHLEGM?: NO/ONLY WITH OCCASIONAL COLDS OR INFECTIONS
HAVE YOU SMOKED AT LEAST 100 CIGARETTES IN YOUR ENTIRE LIFE: YES
COPD SCREENING SCORE: 6

## 2024-12-18 ASSESSMENT — ENCOUNTER SYMPTOMS: BACK PAIN: 1

## 2024-12-18 ASSESSMENT — PAIN SCALES - GENERAL: PAIN_LEVEL: 5

## 2024-12-18 NOTE — CONSULTS
Hospital Medicine Consultation    Date of Service  12/17/2024    Referring Physician  Migdalia Merritt M.D.    Consulting Physician  Akshat Beckford M.D.    Reason for Consultation  Medical management    History of Presenting Illness  Patient is a 69-year-old male with a past medical history of DM2, ANNE, GERD, anxiety, follicular lymphoma, cervical spinal stenosis, that presents after cervical discectomy and laminectomy.  Hospital medicine consulted for medical management.    Patient underwent cervical discectomy and laminectomy with neurosurgery.  Patient answering questions appropriately in the PACU.  Saturating well on 2 L nasal cannula.  Denies any complaints at this time.    Review of Systems  Review of Systems   Constitutional:  Negative for chills and fever.   HENT:  Negative for ear pain.    Eyes:  Negative for blurred vision, double vision and pain.   Respiratory:  Negative for cough, shortness of breath and wheezing.    Cardiovascular:  Negative for chest pain, palpitations and leg swelling.   Gastrointestinal:  Negative for abdominal pain, blood in stool, constipation, diarrhea, melena, nausea and vomiting.   Genitourinary:  Negative for dysuria, frequency and hematuria.   Musculoskeletal:  Positive for back pain. Negative for joint pain and neck pain.   Neurological:  Negative for dizziness and headaches.       Past Medical History   has a past medical history of Anesthesia, Basal cell carcinoma, Cough, Daytime sleepiness, Diabetes (HCC), Vietnamese measles, Hearing difficulty, Hyperlipidemia, Hypertension, Influenza, Morning headache, Mumps, Sleep apnea (12/2020), Snoring, Squamous cell carcinoma in situ (SCCIS) of skin, and Wears glasses.    Surgical History   has a past surgical history that includes lumbar decompression and laminotomy.    Family History  family history includes Cancer in his father, mother, and sister; Colon Cancer in his father; Colorectal Cancer in his father; Hypertension in his  father; Kidney cancer in his sister.    Social History   reports that he has been smoking cigarettes. He started smoking about 48 years ago. He has a 14.6 pack-year smoking history. He has never used smokeless tobacco. He reports that he does not currently use alcohol after a past usage of about 1.2 oz of alcohol per week. He reports that he does not use drugs.    Medications  Prior to Admission Medications   Prescriptions Last Dose Informant Patient Reported? Taking?   Ascorbic Acid (VITAMIN C CR) 1000 MG Tab CR 12/7/2024 Morning Patient Yes No   Sig: Take 1,000 mg by mouth every day.   Empagliflozin (JARDIANCE) 25 MG Tab 12/13/2024 Evening Patient No No   Sig: Take 1 Tablet by mouth every day.   Patient taking differently: Take 25 mg by mouth every day.   Methylsulfonylmethane (MSM) 1000 MG Cap 12/7/2024 Morning Patient Yes No   Sig: Take 1,000 mg by mouth every day.   Multiple Vitamin (MULTI-VITAMIN DAILY PO) 12/7/2024 Morning Patient Yes No   Sig: Take 1 Tablet by mouth every day.   Omega-3 Fatty Acids (FISH OIL) 1000 MG Cap capsule 12/7/2024 Evening Patient Yes No   Sig: Take 1,000 mg by mouth every day.   acetaminophen (TYLENOL) 500 MG Tab 12/16/2024 Patient Yes Yes   Sig: Take 500-1,000 mg by mouth every 6 hours as needed for Moderate Pain.   amLODIPine (NORVASC) 2.5 MG Tab 12/17/2024 at  6:15 AM Patient No Yes   Sig: Take 1 Tablet by mouth every day. Along with amlodipine 5 mg for today of 7.5 mg/day for blood pressure   Patient taking differently: Take 2.5 mg by mouth every day. 2.5+5 =7.5mg total daily dose   amLODIPine (NORVASC) 5 MG Tab 12/17/2024 at  6:15 AM Patient No Yes   Sig: Take 1 Tablet by mouth every day. For high blood pressure   Patient taking differently: Take 5 mg by mouth every day at 6 PM.  2.5+5mg=7.5mg total daily dose   aspirin 81 MG EC tablet 12/7/2024 Morning Patient Yes No   Sig: Take 81 mg by mouth every day.   glucosamine Sulfate 500 MG Cap 12/7/2024 Morning Patient Yes No   Sig:  Take 500 mg by mouth every day.      metFORMIN (GLUCOPHAGE) 500 MG Tab 12/16/2024 Evening Patient No Yes   Sig: Take 1 Tablet by mouth 2 times a day with meals.   metoprolol SR (TOPROL XL) 50 MG TABLET SR 24 HR 12/17/2024 Morning Patient No Yes   Sig: Take 1 Tablet by mouth every day. For heart and blood pressure   omeprazole (PRILOSEC) 20 MG delayed-release capsule 12/17/2024 at  6:15 AM Patient No Yes   Sig: Take 1 capsule by mouth twice a day 30 min before meals   Patient taking differently: Take 20 mg by mouth 2 times a day.   sertraline (ZOLOFT) 50 MG Tab 12/16/2024 Morning Patient No Yes   Sig: Take 1 tablet by mouth every day.   valsartan (DIOVAN) 320 MG tablet 12/16/2024 Morning Patient No Yes   Sig: Take 1 Tablet by mouth every day.      Facility-Administered Medications: None       Allergies  No Known Allergies    Physical Exam  Temp:  [36.2 °C (97.1 °F)-36.9 °C (98.4 °F)] 36.9 °C (98.4 °F)  Pulse:  [73-98] 83  Resp:  [11-20] 14  BP: ()/(52-98) 129/73  SpO2:  [93 %-97 %] 95 %    Physical Exam  Vitals reviewed.   Constitutional:       General: He is not in acute distress.     Appearance: Normal appearance. He is not ill-appearing, toxic-appearing or diaphoretic.   HENT:      Head: Normocephalic and atraumatic.      Mouth/Throat:      Mouth: Mucous membranes are moist.      Pharynx: No oropharyngeal exudate or posterior oropharyngeal erythema.   Eyes:      General: No scleral icterus.     Extraocular Movements: Extraocular movements intact.      Conjunctiva/sclera: Conjunctivae normal.   Cardiovascular:      Rate and Rhythm: Normal rate and regular rhythm.      Heart sounds: Normal heart sounds. No murmur heard.     No friction rub. No gallop.   Pulmonary:      Effort: Pulmonary effort is normal. No respiratory distress.      Breath sounds: Normal breath sounds. No stridor. No wheezing, rhonchi or rales.   Abdominal:      General: Abdomen is flat. There is no distension.      Palpations: Abdomen is  soft. There is no mass.      Tenderness: There is no abdominal tenderness. There is no guarding or rebound.      Hernia: No hernia is present.   Musculoskeletal:         General: No swelling or tenderness. Normal range of motion.      Cervical back: Neck supple. No rigidity.      Right lower leg: No edema.      Left lower leg: No edema.   Lymphadenopathy:      Cervical: No cervical adenopathy.   Skin:     General: Skin is warm and dry.      Coloration: Skin is not jaundiced.   Neurological:      Mental Status: He is alert and oriented to person, place, and time. Mental status is at baseline.      Cranial Nerves: No cranial nerve deficit.         Fluids  Date 12/17/24 0700 - 12/18/24 0659   Shift 4937-2981 9416-8221 4856-9179 24 Hour Total   INTAKE   I.V. 1750 1900  3650   Shift Total 1750 1900  3650   OUTPUT   Urine  900  900   Drains  50  50   Blood  500  500   Shift Total  1450  1450   Weight (kg) 94.1 94.1 94.1 94.1       Laboratory                          Imaging  DX-PORTABLE FLUORO > 1 HOUR    (Results Pending)   DX-CERVICAL SPINE-2 OR 3 VIEWS    (Results Pending)   DX-O-ARM    (Results Pending)       Assessment/Plan  * Cervical spinal stenosis- (present on admission)  Assessment & Plan  Patient underwent laminectomy and discectomy with neurosurgery 12/17/2024.    -Defer management to neurosurgery  -Continue IV cefazolin  -Pain management with PCA pump    Postoperative hypoxia- (present on admission)  Assessment & Plan  Patient requiring 2 L nasal cannula.  Does not require oxygen at baseline.  Likely postoperative hypoxia.  Patient was in the operating room for approximately 6 hours.    -RT protocol  -Supplemental oxygen as needed  -Wean oxygen as tolerated  -Incentive spirometry    Obstructive sleep apnea syndrome- (present on admission)  Assessment & Plan  -CPAP    CECILIA (generalized anxiety disorder)- (present on admission)  Assessment & Plan  -Continue home sertraline    Gastroesophageal reflux disease  without esophagitis- (present on admission)  Assessment & Plan  -Continue home omeprazole    Type 2 diabetes mellitus with microalbuminuria, without long-term current use of insulin (HCC)- (present on admission)  Assessment & Plan  Hemoglobin A1c 6.5%.    -Hypoglycemia protocol  -Diabetic diet  -SSI    Essential hypertension- (present on admission)  Assessment & Plan  -Continue home valsartan, metoprolol, amlodipine

## 2024-12-18 NOTE — THERAPY
Speech Language Pathology   Clinical Swallow Evaluation     Patient Name: Raf Spear  AGE:  69 y.o., SEX:  male  Medical Record #: [id number]  Date of Service: 12/18/2024      History of Present Illness  Pt is a 70 y/o male admitted yesterday for planned cervical discectomy and laminectomy d/t spinal stenosis. S/p anterior C5-C6 ACDF and posterior C3-T1 lami surgery 12/17 and now with post-operative hypoxia.     PMHx: BCC, diabetes, HLD, HTN, sleep apnea.   No previous SLP notes found in EMR.     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 Patient awake, alert, and oriented, with wife and daughter present at bedside. Patient very pleasant and cooperative and RN and family reported no difficulty w/ breakfast meal of regular/thin liquid diet.    Assessment  Current Method of Nutrition: Oral diet (Regular/thins)  Positioning: Vera's (60-90 degrees)  Bolus Administration: Patient  O2 (LPM): 1 O2 Delivery Device: Nasal Cannula  Factor(s) Affecting Performance: None     Swallowing Trials:  Swallowing Trials  Ice: WFL  Thin Liquid (TN0): WFL  Pureed (PU4): WFL  Soft & Bite Sized (SB6): WFL  Easy to Chew  (EC7): WFL  Regular (RG7): WFL    Comments: Patient awake, alert, and with strong, clear vocal quality. 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 Reportedly, they were unsure of the cause of VF paralysis, but since then, his voice has improved significantly and last scope/visit with ENT was August or September and ENT reported increased movement and improvement in VF adduction. Patient reports less symptoms of dysphagia associated with same. Patient and family report spinal stenosis and issues may have been contributing to same. Patient's oral motor evaluation WFL. Patient consumed PO trials of ice, thins via cup sip and straw, purees, soft solids, mixed consistencies, and dry solids. Patient initially consumed large bites of pudding, but once educated to reduce bite/sip size and rate, patient was compliant during remainder of evaluation. Bolus acceptance and containment was adequate. No s/sx of aspiration noted on any textures trialed. RN in to give pill whole w/ thin liquid wash and patient reported mild pharyngeal globus with same, but this cleared with additional liquid wash.     Clinical Impressions  Patient presents with several risk factors for oropharyngeal dysphagia, but currently presents with grossly functional oropharyngeal swallow function for regular diet w/ thin liquids. Patient and family educated extensively on dysphagia, increased risk with ACDF, s/sx of aspiration, risk for pneumonia, swallow precautions, peak swelling time after ACDF, and to have RN contact SLP w/ any difficulty. All verbalized understanding of education. SLP to follow during acute stay and can complete instrumental swallow study if needed.     Recommendations  Diet Consistency: Regular/thins  Instrumentation: Instrumental swallow study pending clinical progress  Medication: As tolerated (float whole in  "puree if needed)   Supervision: Independent  Positioning: Fully upright and midline during oral intake, Remain upright for 30 minutes after oral intake, Meals sitting upright in a chair, as tolerated  Risk Management : Small bites/sips, Slow rate of intake, Reduce environmental distractions, Physical mobility, as tolerated  Oral Care: BID    SLP Treatment Plan  Treatment Plan: Dysphagia Treatment  SLP Frequency: 3x Per Week  Estimated Duration: Until Therapy Goals Met    Anticipated Discharge Needs  Discharge Recommendations: Anticipate that the patient will have no further speech therapy needs after discharge from the hospital   Therapy Recommendations Upon DC: Dysphagia Training, Community Re-Integration, Patient / Family / Caregiver Education      Patient / Family Goals  Patient / Family Goal #1: \"I'm doing a lot better than I was a few months ago\"  Short Term Goals  Short Term Goal # 1: Patient will consume regular diet w/ thin liquids with no overt s/sx of aspiration or decline in respiratory status.    Nikki Forrester, SLP   "

## 2024-12-18 NOTE — ANESTHESIA POSTPROCEDURE EVALUATION
Patient: Raf Spear    Procedure Summary       Date: 12/17/24 Room / Location: [place] [id number] / SURGERY [facility]    Anesthesia Start: 1041 Anesthesia Stop: 1830    Procedures:       ANTERIOR C5 AND C6 CORPECTOMY, AND POSTERIOR C3-T1 INSTRUMENTED FUSION WITH OARM AND C4-7 LAMINECTOMY (Spine Cervical)      DISCECTOMY, SPINE, CERVICAL, ANTERIOR APPROACH, WITH FUSION- C4-C5-C6-C7 (Spine Cervical)      CORPECTOMY, SPINE (Spine Cervical)      LAMINECTOMY, SPINE, CERVICAL, POSTERIOR APPROACH (Spine Cervical) Diagnosis: (SPINAL STENOSIS IN CERVICAL REGION WITH MYELOPATHY)    Surgeons: Migdalia Merritt M.D. Responsible Provider: De Alarcon M.D.    Anesthesia Type: general ASA Status: 2            Final Anesthesia Type: general  Last vitals  BP   Blood Pressure : 132/80    Temp   36.8 °C (98.2 °F)    Pulse   83   Resp   14    SpO2   92 %      Anesthesia Post Evaluation    Patient location during evaluation: PACU  Patient participation: complete - patient participated  Level of consciousness: awake and alert  Pain score: 5    Airway patency: patent  Anesthetic complications: no  Cardiovascular status: hemodynamically stable  Respiratory status: acceptable  Hydration status: euvolemic    PONV: none          No notable events documented.     Nurse Pain Score: 5 (NPRS)

## 2024-12-18 NOTE — OR NURSING
Assume care for pt in pre-op. Pt pre op checklist complete. Consents for surgical procedure signed and on chart. Iv placed, cod sample sent to lab. Bed in lowest position, call light within reach, all questions answered.  
Handoff report given to Nazanin Vogel RN.  Pt A&OX4. VSS. Follows commands and moves all extremities.   
[facility]

## 2024-12-18 NOTE — PROGRESS NOTES
4 Eyes Skin Assessment Completed by FLORENCE León and FLORENCE España.    Head WDL  Ears Redness and Blanching  Nose WDL  Mouth WDL  Neck Incision to anterior neck, LURDES, covered with dermabond, MARKELL drain to sx site. Incision to posterior neck, covered with silver aquacel, CDI  Breast/Chest WDL  Shoulder Blades WDL  Spine WDL  (R) Arm/Elbow/Hand WDL  (L) Arm/Elbow/Hand WDL  Abdomen WDL  Groin WDL  Scrotum/Coccyx/Buttocks WDL  (R) Leg WDL  (L) Leg WDL  (R) Heel/Foot/Toe Calloused  (L) Heel/Foot/Toe WDL          Devices In Places Blood Pressure Cuff, Pulse Ox, Fermin, SCD's, Nasal Cannula, and ANGELES's      Interventions In Place Gray Ear Foams and Pillows    Possible Skin Injury No    Pictures Uploaded Into Epic N/A  Wound Consult Placed N/A  RN Wound Prevention Protocol Ordered No

## 2024-12-18 NOTE — ASSESSMENT & PLAN NOTE
Resolved  Now on room air  Procalcitonin negative  Repeat chest x-ray unremarkable for cardiopulmonary disease

## 2024-12-18 NOTE — PROGRESS NOTES
Wife and daughter to bedside to see pt. Questions addressed and support provided. Given pts cell phone,  and  for hearing aids, labeled and placed with other belongings.

## 2024-12-18 NOTE — ASSESSMENT & PLAN NOTE
Patient underwent laminectomy and discectomy with neurosurgery 12/17/2024.  Pain management  Drain removed  PT/OT recommending home health

## 2024-12-18 NOTE — PROGRESS NOTES
Neurosurgery Progress Note    Subjective:  No events overnight.  Patient remained in PACU due to high hospital census.  States that he slept pretty well.  Has had some pain in the back of his neck when he flexes and extends his head and turns his neck slightly.  Otherwise, he only used the Dilaudid PCA 6 times.  He feels that the numbness in his fingertips is slightly improved, and the numbness in his feet is significantly improved.    Exam:    A&O x3, Follows commands  CN II-XII grossly intact.  Motor exam 5/5 in all muscle groups tested except for left hand  weakness 4+/5, left triceps weakness 4+/5.  SILT in all extremities.    Posterior neck or Anterior neck incision clean, dry intact.  Anterior neck MARKELL and Hemovac in posterior neck in place.    Recent Labs     12/18/24  0347   WBC 15.3*   RBC 4.90   HEMOGLOBIN 10.1*   HEMATOCRIT 33.8*   MCV 69.0*   MCH 20.6*   MCHC 29.9*   RDW 45.1   PLATELETCT 219   MPV 9.5     Recent Labs     12/18/24  0347   SODIUM 137   POTASSIUM 4.6   CHLORIDE 105   CO2 20   GLUCOSE 159*   BUN 16   CREATININE 0.75   CALCIUM 7.7*               Intake/Output                         12/17/24 0700 - 12/18/24 0659 12/18/24 0700 - 12/19/24 0659     2153-8904 0517-2731 Total 8269-1476 2887-8684 Total                 Intake    P.O.  --  300 300  240  -- 240    P.O. -- 300 300 240 -- 240    I.V.  3650  -- 3650  --  -- --    Volume (mL) (Lactated Ringers) 1000 -- 1000 -- -- --    Volume (mL) (electrolyte-A (Plasmalyte-A) infusion) 2000 -- 2000 -- -- --    Volume (mL) (NS infusion) 650 -- 650 -- -- --    Total Intake 3650 300 3950 240 -- 240       Output    Urine  900  1925 2825  --  -- --    Urine 900 -- 900 -- -- --    Output (mL) (Urethral Catheter Non-latex;Temperature probe 16 Fr.) -- 1925 1925 -- -- --    Drains  50  165 215  --  -- --    Output (mL) (Closed/Suction Drain 1 Right Neck Marcus Yadav 7 Fr.) 50 45 95 -- -- --    Output (mL) (Closed/Suction Drain 1 Neck Hemovac) -- 120 120 --  -- --    Stool  --  -- --  --  -- --    Number of Times Stooled 1 x -- 1 x -- -- --    Blood  500  -- 500  --  -- --    Est. Blood Loss 500 -- 500 -- -- --    Total Output 1450 2090 3540 -- -- --       Net I/O     2200 -1790 410 240 -- 240          Assessment and Plan:  Patient is hospital day # 2, POD# 1, Status post C5-6 anterior corpectomy, C3-T1 posterior lateral instrumentation, C4-7 laminectomy.    Chemical prophylactic DVT therapy: No  Start date/time: Pending drain removal    - Transfer to floor.  -  Continue drain to self-suction.  -Patient to wear hard cervical collar when upright and out of bed.  - Pain control, transition to oral medications from PCA, muscle relaxer.\  - Advance diet as tolerated.  - Remove Fermin POD1 or sooner.  - PT/OT/OOB    Please call with questions.    Migdalia Merritt M.D.

## 2024-12-18 NOTE — ANESTHESIA TIME REPORT
Anesthesia Start and Stop Event Times       Date Time Event    12/17/2024 0955 Ready for Procedure     1041 Anesthesia Start     1830 Anesthesia Stop          Responsible Staff  12/17/24      Name Role Begin End    De Alarcon M.D. Anesth 1041 1830          Overtime Reason:  overtime    Comments:

## 2024-12-18 NOTE — OP REPORT
Neurosurgery Operative Note    Patient Name: Raf Spear MRN: [id number] YOB: 1955  Date of Surgery: 12/17/2024     SURGEON: Migdalia Merritt M.D.    ASSISTANT: ZAIDA Huddleston    PRE-OPERATIVE DIAGNOSIS:   Cervical stenosis with myelopathy  C4-5, C5-6, C6-7 degenerative disc disease, narrowing of the disc space, central disc herniation, hypertrophy of the posterior longitudinal ligament and ligamentum flavum causing severe central canal and bilateral lateral recess stenosis at C5-6, C6-7  C5-6 myelomalacia           POST-OPERATIVE DIAGNOSIS:   Cervical stenosis with myelopathy  C4-5, C5-6, C6-7 degenerative disc disease, narrowing of the disc space, central disc herniation, hypertrophy of the posterior longitudinal ligament and ligamentum flavum causing severe central canal and bilateral lateral recess stenosis at C5-6, C6-7  C5-6 myelomalacia           PROCEDURE:   Stage I  C4-5, C6-7 anterior cervical diskectomy for endplate preparation for arthrodesis  100% C5 and 100% C6 corpectomy for neurological decompression  Placement of interbody cage from C4-5, C5-6, C6-7  Anterior instrumentation from C4-5, C5-6, C6-7  Interbody arthrodesis with DBM putty, bone morphogenic protein, autograft from same incision    Stage II  Laminectomy for neurological decompression at C4-5, C5-6, C6-7  Arthrodesis at C3-4, C4-5, C5-6, C6-7, C7-T1 with use of BMP, autograft, allograft.  Posterior instrumentation at C3-4, C4-5, C5-6, C6-7, C7-T1  Use of intraoperative stereotactic neuronavigation with Medtronic Stealth system           ANESTHESIA: GETA           ESTIMATED BLOOD LOSS: 500cc           DRAINS: 7 mm flat MARKELL drain anterior neck  Medium hemovac posterior neck    FINDINGS: Severe cervical stenosis at C5 and C6  Good placement of corpectomy cage anteriorly  Good placement of posterior lateral instrumentation posteriorly  No change in MEP/SSEP from baseline at end of case           SPECIMENS: * No specimens in  log *           IMPLANTS: Medtronic system    Stage I  13 mm Stratosphere expandable corpectomy cage 31 to 46 mm  60 mm Elite plate  C4 right 4.5 x 17 mm   C4 left 4.0 x 17 mm  C7 4.0 x 15 mm bilaterally x 2   Extra small BMP  1 cc Manson    Stage II  C3 3.5 x 14 mm bilaterally  C4 3.5 x 14 mm bilaterally  C5 3.5 x 14 mm bilaterally  C6 3.5 x 14 mm bilaterally  C7 left 4.0 x 30 mm right 4.5 x 30 mm  100 mm ling x 2  10 cm x 1 cm Magnifuse             COMPLICATIONS: None apparent.    Operative Indications: The patient is a 69-year-old man presenting for management of cervical spinal stenosis and cervical myelopathy.  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  He has had neck pain intermittently over the last number of years, and has started experiencing tingling in the tips of his fingers fingers and bottoms of his feet which worsens when his hands are and active particularly in the mornings.  He has noticed a significant change in his gait and referring it to as a shuffling which makes him slower and causes him to lag behind when walking with others.  He does notice new imbalance and needing to concentrate to prevent dropping objects.  On neurological exam he was noted to have hyperreflexia in his bilateral upper and lower extremities, decreased sensation in the C6 dermatome, bilateral hand  weakness, bilateral triceps weakness while in preop, bilateral lower extremity weakness.  Imaging revealed severe degenerative disc disease at C4-5, C5-6, C6-7 with very large disc osteophyte complex and vertebral body of C5 and C6 causing severe central canal stenosis, compression of the spinal cord, T2/cord signal changes more on the right than on the left.  There is also a broad-based disc bulge and ligamentum flavum buckling at C6-7.  I had a very long conversation with the patient and his family about  his neuroimaging findings, symptoms,'s recommended plan of care.  I discussed the need for an anterior C5 and C6 corpectomy for complete neurological decompression of his spinal cord given the significant hypertrophy of the posterior longitudinal ligament and disc osteophyte complex.  He would also need posterior C3-T1 instrumented fusion and C4-C7 decompression for 360 degree decompression of the spinal cord. The indications, benefits, alternatives and risks to the procedure were discussed with the patient, which included but were not limited to bleeding, infection, stroke, injury to nearby nerves and vessels, cerebrospinal fluid leak, new weakness or sensory changes, paralysis, difficulty swallowing, hoarseness, worsened imbalance or discoordination, hardware failure, coma and rarely, even death.  The patient was in agreement and wished to proceed.      Operative Details: The patient was identified in the pre-operative holding area by perioperative staff by two forms of identification. The patient was brought to the operating room, induced under general anesthesia and intubated without complication. Antibiotics were administered. A Fermin catheter was in place and IV access and arterial line were placed by the Anesthesia team.  SCDs were turned on.  The patient's mean arterial pressure was maintained between 80 and 85 mmHg throughout the case.  The patient was positioned supine on the regular operating table with the head on a gel donut and a gel pad under the shoulders.  Subcutaneous needles were placed for neuromonitoring of MEP and SSEP, and baselines were achieved.  The arms were padded and tucked at the side, secured with sheets. Fluoroscopy was used to identify the correct level, and a 3cm vertical right anterior neck incision was planned parallel to the anterior border of the sternocleidomastoid muscle.  The hair on the neck was clipped as needed. The skin was prepped with isopropyl alcohol soaked 4 x 4  sponges, followed by ChloraPrep.  The patient was draped in the usual sterile fashion.  A formal timeout indicated the correct patient procedure and levels.       Marcaine 0.5% with epinephrine 1:200,000 was injected subcutaneously along the incision. Incision was made with a 10-blade. Monopolar cautery was used to dissect through the subcutaneous tissue and platysma. A self-retaining retractor was placed for skin retraction. A hand-held retractor medially mobilized the trachea and esophagus, and a peanut was used to dissect through the neck fascia.  The carotid sheath containing the internal jugular vein and the carotid artery was identified and dissected, retracted to the lateral aspect of the exposure.  Peanuts were used to continue dissection deep toward the cervical spine, exposing the longus colli muscles and anterior longitudinal ligament. A spine needle was placed in this disk space and fluoroscopy confirmed the correct C5-6 level. The spine needle was removed, and monopolar cautery was used to subperiosteally dissect the inferior half of C4, all of C5, all of C6 and superior half of C7 vertebral bodies. Fixed retractors were placed for cranio-caudal and lateral visualization. The microscope was draped and brought into the field.     Attention was first turned to the C4-5 disc space.  Under bright magnification using microsurgical technique, the anterior disk at C4-5 was incised with a 15 blade and removed in a piecemeal fashion with microcurettes, pituitary rongeurs and Kerrison rongeurs. [place] pins and retractor were placed and used to provide additional distraction of the level. A high-speed drill with AM8 bit was used to drill the posterior osteophytes and bilateral neural foramen. The posterior longitudinal ligament was opened with Kerrison rongeurs to expose healthy dura. Once I was satisfied with the diskectomy, the inferior C4 endplate was further curretted to enhance arthrodesis.     Attention was  then turned to the C5-6 disc space.  Under bright magnification using microsurgical technique, the anterior disk at C5-6 was incised with a 15 blade and removed in a piecemeal fashion with microcurettes, pituitary rongeurs and Kerrison rongeurs. [place] pins and retractor were placed and used to provide additional distraction of the level. A high-speed drill with AM8 bit was used to drill the posterior osteophytes and bilateral neural foramen. The posterior longitudinal ligament was opened with Kerrison rongeurs to expose healthy dura.  At this juncture, there was a significant decrement in the bilateral lower extremity and the left greater than right upper extremity MEP, and also a decrease in SSEPs.  These signals improved back to baseline over the next 45 minutes.    Attention was then turned to the C6-7 disc space. Under bright magnification using microsurgical technique, the anterior disk at C6-7 was incised with a 15 blade and removed in a piecemeal fashion with microcurettes, pituitary rongeurs and Kerrison rongeurs. [place] pins and retractor were placed and used to provide additional distraction of the level. A high-speed drill with AM8 bit was used to drill the posterior osteophytes and bilateral neural foramen. The posterior longitudinal ligament was opened with Kerrison rongeurs to expose healthy dura.    The complete C5 corpectomy was performed in the following manner.  The anterior vertebral body was resected with a Leksell rongeur, and the bone was used for autograft later for the fusion.  The middle and posterior vertebral body was resected with a high-speed drill and 3 mm matchstick bur until the posterior longitudinal ligament was reached.    The complete C6 corpectomy was performed in the following manner.  The anterior vertebral body was resected with a Leksell rongeur, and the bone was used for autograft later for the fusion.  The middle and posterior vertebral body was resected with a high-speed  drill and 3 mm matchstick bur until the posterior longitudinal ligament was reached.    The remaining posterior longitudinal ligament was carefully dissected away from the ventral aspect of the dura using upgoing curette, which did not have much adherence between the 2 structures.  The PLL was resected in a piecemeal fashion using a combination of 2 and 3 mm Kerrison rongeurs.  The thecal sac appeared to be very well decompressed anteriorly.  A caliper was used to measure the appropriate length of cage.  The area of the corpectomies was further expanded laterally using a high-speed drill with 3 mm matchstick bur until a 13 mm diameter cage could be safely placed.  Hemostasis was achieved with liquid thrombin. The cage was brought into the field, placed in the corpectomy defects, and expanded using fluoroscopy guidance.  Once the maximum torque for the anatomy was reached, the height of the cage was locked.  The superior and inferior plates of the cage were gently tamped further into the defect to ensure excellent apposition between the C4 and C7 endplates respectively.    The cage was packed with DBM putty, bone morphogenic protein and autograft.     An anterior plate was placed across C4, C5, C6, C7 vertebral bodies to fixate the cages in place.  holes were drilled in C4 and C7 and 17mm self-drilling screws were placed in the C4 vertebral body and 15mm self drilling screws were placed in the C7 vertebral body through the plate. A final Xray confirmed good hardware placement and good spinal alignment.  MEP and SSEPs were stable to baseline.    At this juncture, the internal jugular vein was noted to be bleeding.  This was repaired primarily with a running 6-0 Prolene suture.  There was about 200 cc of blood loss during this process.  Hemostasis was well achieved.     The fixed retractors were removed. The field was irrigated with Ancef irrigation. Hemostasis was meticulously achieved with bipolar cautery. A 7mm  Marcus Yadav drain was tunneled subcutaneously and placed in the prevertebral space. It was secured to the skin with a drain stitch.     The platysma was reapproximated with interrupted 3-0 Vicryl suture. The deep dermal layer was reapproximated with interrupted 3-0 Vicryl suture. A 4-0 monocryl was used for a running subcuticular stitch. Dermabond was applied to the skin.     The patient remained intubated, and the hard cervical collar was placed.  The Hickman head juarez was placed onto the patient's head to 60 pounds of pressure.  The patient was turned prone onto an OSI table with chest hip and thigh pads, and the arms tucked at the sides, secured with sheets and towel clamps.  The knees were padded.  A midline incision over C3, C4, C5, C6, C7, T1  spinous processes was planned using body landmarks.  The posterior neck and upper back were clipped of hair as needed, cleansed with isopropyl alcohol soaked 4 x 4 sponges, followed by ChloraPrep.  The patient was draped in the usual sterile fashion.  A second timeout indicated the correct patient procedure and levels.      Marcaine 0.5% with epinephrine 1:200,000 was injected subcutaneously along the incision. Incision was made with a PlasmaBlade.  PlasmaBlade cautery was used to dissect through the subcutaneous tissue and muscular fascia down to the cervical spinous processes.  The spinous processes were dissected of soft tissue in the subperiosteal plane using a combination of monopolar cautery and Ansari instruments.  Dissection continued until the C3, C4, C5, C6, C7 lateral masses, and T1 transverse processes were exposed bilaterally.    The neuronavigation clamp had been secured to the Haley head juarez preoperatively and draped sterilely into the field.  A CT scan was obtained using the O-arm, which confirmed the correct levels.  The neuronavigation passive frame was co-registered to the Tabula neuronavigation system.  The intraoperative CT scan  was used to pre-plan the trajectory of the lateral mass screws using the Stealth neuronavigation system in the Martell technique. A navigated high-speed drill with an 2 mm drill bit was used to drill  holes in the lateral mass through the cortex to the cancellous bone bilaterally at C3, C4, C5, C6 utilizing the pre-planned trajectories.  Pedicle screws were planned using the Stealth neuronavigation at T1 bilaterally, and the navigated high-speed drill was used to drill  holes in the planned trajectories.  Each  hole was then tapped with a navigated 3.0 mm tap and probed with a ball-tipped probe to ensure no deep, medial or lateral breach. The screw instrumentation was then placed under neuronavigation bilaterally at C3, C4, C5, C6, T1.  An O-arm spin was performed to ensure good placement of the screws.  The right T1 screw was noted to be slightly medial, and this was repositioned using neuronavigation guidance 2 additional times until acceptable trajectory was achieved.  Good placement was confirmed with AP and lateral fluoroscopy.    Complete laminectomies were then performed at C4-5, C5-6, C6-7 with a combination of high-speed drill with a matchstick jamee and a Kerrison rongeur.  The lateral recesses were cleared of hypertrophied ligament.  Once this was satisfactory, the field was copiously irrigated with Ancef infused saline, and hemostasis was achieved.      Titanium rods were fitted bilaterally, secured with locking caps, and final-tightened with a torque/counter-torque.  A high-speed drill was used to decorticate the lateral masses and joints at C3-4, C4-5, C5-6, C6-7, C7-T1 for arthrodesis.  A 1 cm x 10 cm Magnifuse allograft, autologous bone graft, and bone morphogenic protein were applied to the joint spaces to augment the arthrodesis.     Hemostasis was meticulously achieved. The field was again irrigated copiously with Ancef irrigation.  A medium Hemovac was placed in the submuscular  space, tunneled through the skin, and secured with a drain stitch.      The incision was closed in layers, using interrupted 0-Vicryl for muscle and fascia, interrupted inverted 2-0 Vicryl for deep dermal layers, and staples for skin.  Aquacel dressing was placed over the incision.      The patient was turned supine on the hospital bed. The patient was extubated, and taken to the recovery room in a stable condition.    All counts were correct x2.     ADELAIDA Clancy was present for all parts of the surgery, including the incision, exposure, critical portions of the procedure and closure.    A first assistant was required for assistance with exposure, tissue retraction, suctioning, irrigating the field and closure.

## 2024-12-18 NOTE — PROGRESS NOTES
1140: Pt arrived on unit via hospital bed. Assumed care of patient. Patient placed in a position of comfort.

## 2024-12-18 NOTE — CARE PLAN
Problem: Pain Management  Goal: Pain level will decrease to patient's comfort goal  Outcome: Progressing     Problem: Knowledge Deficit  Goal: Knowledge of disease process/condition, treatment plan, diagnostic tests, and medications will improve  Outcome: Progressing   The patient is Stable - Low risk of patient condition declining or worsening         Progress made toward(s) clinical / shift goals:  patient a+o x 4, refer to flowsheet for neuro assessment, bilateral tinging in feet and hands - baseline per patient but less than pre-surgery, pca discontinued, no request for pain medication, rhoades patent and draining, +MARKELL and +hemovac, patient waiting to see speech for solid food intake, tolerating liquids with no swallowing difficulty, vss, afebrile, no needs at this time poc onging.     Patient is not progressing towards the following goals: n/a

## 2024-12-18 NOTE — CARE PLAN
The patient is Stable - Low risk of patient condition declining or worsening    Shift Goals  Clinical Goals: Pain management, mobility, q4h neuro checks, monitor drain output  Patient Goals: Pain control, mobility, comfort  Family Goals: POC updates    Progress made toward(s) clinical / shift goals:        Problem: Pain Management  Goal: Pain level will decrease to patient's comfort goal  Outcome: Progressing     Problem: Fall Risk  Goal: Patient will remain free from falls  Outcome: Progressing     Problem: Knowledge Deficit - Standard  Goal: Patient and family/care givers will demonstrate understanding of plan of care, disease process/condition, diagnostic tests and medications  Outcome: Progressing       Patient is not progressing towards the following goals:

## 2024-12-18 NOTE — PROGRESS NOTES
Virtual Nurse rounding and admission complete.    Round Needs: Other: Ordered influenza vaccine; family at bedside was asking if the other bed rail could be raised to help keep the patient from getting out of bed when he is alone in his room. Informed family that if all four bed rails were up, it is considered a restraint. Family verbalized understanding. Explained how the bed alarms work and that the alarm notifies staff if a patient is attempting to get out of bed. Ensured family that this VRN will reach out to the primary RN to confirm bed alarm is on and in place.  and Notified of Patient Needs: RN

## 2024-12-18 NOTE — ASSESSMENT & PLAN NOTE
Hemoglobin A1c 6.5%.    -Hypoglycemia protocol  -Diabetic diet  -SSI    12/19/2024  Fingerstick remained stable in 140s

## 2024-12-19 ENCOUNTER — APPOINTMENT (OUTPATIENT)
Dept: RADIOLOGY | Facility: MEDICAL CENTER | Age: 69
DRG: 430 | End: 2024-12-19
Attending: STUDENT IN AN ORGANIZED HEALTH CARE EDUCATION/TRAINING PROGRAM
Payer: MEDICARE

## 2024-12-19 ENCOUNTER — HOME HEALTH ADMISSION (OUTPATIENT)
Dept: HOME HEALTH SERVICES | Facility: HOME HEALTHCARE | Age: 69
End: 2024-12-19
Payer: MEDICARE

## 2024-12-19 PROBLEM — D72.829 LEUCOCYTOSIS: Status: ACTIVE | Noted: 2024-12-19

## 2024-12-19 LAB
ANION GAP SERPL CALC-SCNC: 9 MMOL/L (ref 7–16)
BUN SERPL-MCNC: 18 MG/DL (ref 8–22)
CALCIUM SERPL-MCNC: 8.2 MG/DL (ref 8.5–10.5)
CHLORIDE SERPL-SCNC: 105 MMOL/L (ref 96–112)
CO2 SERPL-SCNC: 22 MMOL/L (ref 20–33)
CREAT SERPL-MCNC: 0.89 MG/DL (ref 0.5–1.4)
ERYTHROCYTE [DISTWIDTH] IN BLOOD BY AUTOMATED COUNT: 44.5 FL (ref 35.9–50)
GFR SERPLBLD CREATININE-BSD FMLA CKD-EPI: 91 ML/MIN/1.73 M 2
GLUCOSE BLD STRIP.AUTO-MCNC: 147 MG/DL (ref 65–99)
GLUCOSE BLD STRIP.AUTO-MCNC: 153 MG/DL (ref 65–99)
GLUCOSE BLD STRIP.AUTO-MCNC: 155 MG/DL (ref 65–99)
GLUCOSE BLD STRIP.AUTO-MCNC: 161 MG/DL (ref 65–99)
GLUCOSE SERPL-MCNC: 134 MG/DL (ref 65–99)
HCT VFR BLD AUTO: 33.2 % (ref 42–52)
HGB BLD-MCNC: 9.8 G/DL (ref 14–18)
MCH RBC QN AUTO: 20.2 PG (ref 27–33)
MCHC RBC AUTO-ENTMCNC: 29.5 G/DL (ref 32.3–36.5)
MCV RBC AUTO: 68.3 FL (ref 81.4–97.8)
PLATELET # BLD AUTO: 205 K/UL (ref 164–446)
PMV BLD AUTO: 9.5 FL (ref 9–12.9)
POTASSIUM SERPL-SCNC: 4.4 MMOL/L (ref 3.6–5.5)
PROCALCITONIN SERPL-MCNC: 0.1 NG/ML
RBC # BLD AUTO: 4.86 M/UL (ref 4.7–6.1)
SODIUM SERPL-SCNC: 136 MMOL/L (ref 135–145)
WBC # BLD AUTO: 14.3 K/UL (ref 4.8–10.8)

## 2024-12-19 PROCEDURE — 770001 HCHG ROOM/CARE - MED/SURG/GYN PRIV*

## 2024-12-19 PROCEDURE — 97535 SELF CARE MNGMENT TRAINING: CPT

## 2024-12-19 PROCEDURE — 84145 PROCALCITONIN (PCT): CPT

## 2024-12-19 PROCEDURE — 97530 THERAPEUTIC ACTIVITIES: CPT

## 2024-12-19 PROCEDURE — 92526 ORAL FUNCTION THERAPY: CPT

## 2024-12-19 PROCEDURE — 80048 BASIC METABOLIC PNL TOTAL CA: CPT

## 2024-12-19 PROCEDURE — 82962 GLUCOSE BLOOD TEST: CPT | Mod: 91

## 2024-12-19 PROCEDURE — 700111 HCHG RX REV CODE 636 W/ 250 OVERRIDE (IP)

## 2024-12-19 PROCEDURE — 85027 COMPLETE CBC AUTOMATED: CPT

## 2024-12-19 PROCEDURE — 700102 HCHG RX REV CODE 250 W/ 637 OVERRIDE(OP)

## 2024-12-19 PROCEDURE — 71045 X-RAY EXAM CHEST 1 VIEW: CPT

## 2024-12-19 PROCEDURE — 99233 SBSQ HOSP IP/OBS HIGH 50: CPT | Performed by: STUDENT IN AN ORGANIZED HEALTH CARE EDUCATION/TRAINING PROGRAM

## 2024-12-19 PROCEDURE — A9270 NON-COVERED ITEM OR SERVICE: HCPCS

## 2024-12-19 RX ORDER — DEXAMETHASONE SODIUM PHOSPHATE 4 MG/ML
4 INJECTION, SOLUTION INTRA-ARTICULAR; INTRALESIONAL; INTRAMUSCULAR; INTRAVENOUS; SOFT TISSUE EVERY 6 HOURS
Status: DISCONTINUED | OUTPATIENT
Start: 2024-12-19 | End: 2024-12-20 | Stop reason: HOSPADM

## 2024-12-19 RX ADMIN — ACETAMINOPHEN 1000 MG: 500 TABLET ORAL at 11:57

## 2024-12-19 RX ADMIN — Medication 1 APPLICATOR: at 17:32

## 2024-12-19 RX ADMIN — DOCUSATE SODIUM 100 MG: 100 CAPSULE, LIQUID FILLED ORAL at 04:15

## 2024-12-19 RX ADMIN — OXYCODONE 5 MG: 5 TABLET ORAL at 08:08

## 2024-12-19 RX ADMIN — DEXAMETHASONE SODIUM PHOSPHATE 4 MG: 4 INJECTION INTRA-ARTICULAR; INTRALESIONAL; INTRAMUSCULAR; INTRAVENOUS; SOFT TISSUE at 14:15

## 2024-12-19 RX ADMIN — AMLODIPINE BESYLATE 7.5 MG: 5 TABLET ORAL at 04:15

## 2024-12-19 RX ADMIN — ACETAMINOPHEN 1000 MG: 500 TABLET ORAL at 04:15

## 2024-12-19 RX ADMIN — SENNOSIDES AND DOCUSATE SODIUM 1 TABLET: 50; 8.6 TABLET ORAL at 20:07

## 2024-12-19 RX ADMIN — Medication 1 APPLICATOR: at 04:14

## 2024-12-19 RX ADMIN — ACETAMINOPHEN 1000 MG: 500 TABLET ORAL at 17:32

## 2024-12-19 RX ADMIN — INSULIN LISPRO 1 UNITS: 100 INJECTION, SOLUTION INTRAVENOUS; SUBCUTANEOUS at 07:59

## 2024-12-19 RX ADMIN — ONDANSETRON 4 MG: 2 INJECTION INTRAMUSCULAR; INTRAVENOUS at 07:36

## 2024-12-19 RX ADMIN — INSULIN LISPRO 1 UNITS: 100 INJECTION, SOLUTION INTRAVENOUS; SUBCUTANEOUS at 20:11

## 2024-12-19 RX ADMIN — INSULIN LISPRO 1 UNITS: 100 INJECTION, SOLUTION INTRAVENOUS; SUBCUTANEOUS at 17:42

## 2024-12-19 RX ADMIN — MAGNESIUM HYDROXIDE 30 ML: 1200 LIQUID ORAL at 17:45

## 2024-12-19 RX ADMIN — DOCUSATE SODIUM 100 MG: 100 CAPSULE, LIQUID FILLED ORAL at 17:32

## 2024-12-19 RX ADMIN — METHOCARBAMOL TABLETS 750 MG: 750 TABLET, COATED ORAL at 20:07

## 2024-12-19 RX ADMIN — OMEPRAZOLE 20 MG: 20 CAPSULE, DELAYED RELEASE ORAL at 17:32

## 2024-12-19 RX ADMIN — GABAPENTIN 300 MG: 300 CAPSULE ORAL at 11:57

## 2024-12-19 RX ADMIN — OMEPRAZOLE 20 MG: 20 CAPSULE, DELAYED RELEASE ORAL at 04:14

## 2024-12-19 RX ADMIN — METHOCARBAMOL TABLETS 750 MG: 750 TABLET, COATED ORAL at 14:14

## 2024-12-19 RX ADMIN — VALSARTAN 320 MG: 80 TABLET ORAL at 04:15

## 2024-12-19 RX ADMIN — GABAPENTIN 300 MG: 300 CAPSULE ORAL at 04:20

## 2024-12-19 RX ADMIN — SERTRALINE HYDROCHLORIDE 50 MG: 50 TABLET ORAL at 04:15

## 2024-12-19 RX ADMIN — METHOCARBAMOL TABLETS 750 MG: 750 TABLET, COATED ORAL at 04:15

## 2024-12-19 RX ADMIN — GABAPENTIN 300 MG: 300 CAPSULE ORAL at 17:32

## 2024-12-19 RX ADMIN — OXYCODONE 5 MG: 5 TABLET ORAL at 12:18

## 2024-12-19 RX ADMIN — POLYETHYLENE GLYCOL 3350 1 PACKET: 17 POWDER, FOR SOLUTION ORAL at 04:14

## 2024-12-19 RX ADMIN — METOPROLOL SUCCINATE 50 MG: 50 TABLET, EXTENDED RELEASE ORAL at 04:15

## 2024-12-19 RX ADMIN — DEXAMETHASONE SODIUM PHOSPHATE 4 MG: 4 INJECTION INTRA-ARTICULAR; INTRALESIONAL; INTRAMUSCULAR; INTRAVENOUS; SOFT TISSUE at 17:32

## 2024-12-19 ASSESSMENT — COGNITIVE AND FUNCTIONAL STATUS - GENERAL
MOBILITY SCORE: 18
WALKING IN HOSPITAL ROOM: A LITTLE
TURNING FROM BACK TO SIDE WHILE IN FLAT BAD: A LITTLE
MOVING FROM LYING ON BACK TO SITTING ON SIDE OF FLAT BED: A LITTLE
CLIMB 3 TO 5 STEPS WITH RAILING: A LITTLE
MOVING TO AND FROM BED TO CHAIR: A LITTLE
STANDING UP FROM CHAIR USING ARMS: A LITTLE
SUGGESTED CMS G CODE MODIFIER MOBILITY: CK

## 2024-12-19 ASSESSMENT — ENCOUNTER SYMPTOMS
COUGH: 1
SHORTNESS OF BREATH: 0
SPUTUM PRODUCTION: 0
BACK PAIN: 1

## 2024-12-19 ASSESSMENT — PAIN DESCRIPTION - PAIN TYPE
TYPE: SURGICAL PAIN
TYPE: ACUTE PAIN
TYPE: ACUTE PAIN;SURGICAL PAIN
TYPE: ACUTE PAIN;SURGICAL PAIN

## 2024-12-19 ASSESSMENT — PATIENT HEALTH QUESTIONNAIRE - PHQ9
2. FEELING DOWN, DEPRESSED, IRRITABLE, OR HOPELESS: NOT AT ALL
SUM OF ALL RESPONSES TO PHQ9 QUESTIONS 1 AND 2: 0
1. LITTLE INTEREST OR PLEASURE IN DOING THINGS: NOT AT ALL

## 2024-12-19 ASSESSMENT — ACTIVITIES OF DAILY LIVING (ADL): TOILETING: INDEPENDENT

## 2024-12-19 NOTE — DISCHARGE PLANNING
HTH/SCP TCN chart review completed. Collaborated with CM prior to meeting with the pt. The most current review of medical record, knowledge of pt's PLOF and social support, LACE+ score of 61, 6 clicks scores of 18 mobility were considered.      Pt seen at bedside with spouse present. Introduced TCN program. Provided education regarding post acute levels of care. Education provided regarding case management policy for blanket SNF referrals. Discussed HTH/SCP plan benefits. Pt verbalizes understanding.     Patient and spouse provided choice for HH and DME (O2).  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  Noted PT recommending home health and OT orders in chart. Spouse reports she is interested in shower chair; TCN informed spouse that SCP does not cover shower chair however offered resources for [facility] to rent.  TCN informed spouse patient has OT orders, and OT may be able to help with shower chair recommendations.  Noted patient currently saturating 93% on 1.5L supplemental O2.  No further TCN needs at this time.      Choice proactively obtained for HH and DME (O2) and faxed to [facility].  Current discharge considerations are anticipated to be home with home health and DME (O2) if needed. TCN will continue to follow and collaborate with discharge planning team as additional post acute needs arise. Thank you.     Completed today:  PT recommends home health - 12/18  OT orders in chart   Choice obtained: HH (1-[facility], 2-[facility], DME (O2-[facility])  Pt aware of [facility]'s blanket referral policy  SCP with [facility] PCP. Anticipate post op f/u.

## 2024-12-19 NOTE — THERAPY
"Occupational Therapy Contact Note    Patient Name: Raf Spear  Age:  69 y.o., Sex:  male  Medical Record #: [id number]  Today's Date: 12/19/2024    Attempted patient 3x for OT evaluation. First two attempts family requesting therapy at a later time, stating \"pt had a rough night and is more confused today\", third attempt RN stating pt was hypotensive after mobilizing to the chair. Discussed PLOF and equipment with family. Full evaluation to follow when appropriate to participate.     12/19/24 1330   Prior Living Situation   Prior Services Home-Independent   Housing / Facility 1 Story House   Equipment Owned Front-Wheel Walker;Tub / Shower Seat   Lives with - Patient's Self Care Capacity Spouse   Prior Level of ADL Function   Self Feeding Independent   Grooming / Hygiene Independent   Bathing Independent   Dressing Independent   Toileting Independent   Education Group   Additional Comments education on spine precautions while in house, and family/pt advocating for OOB activity as tolerated       "

## 2024-12-19 NOTE — DISCHARGE PLANNING
Care Transition Team Assessment  PCP: Ryan Carranza M.D.   LMSW met with pt at bedside to complete assessment. Pt was resting and pt's spouse at bedside was able to verify the information on the face sheet. Pt lives with his spouse in a single-story house at [address] [place] [address] [place] [address] that has one step to enter. Prior to this hospitalization, pt was independent at home with ADLs and IADLs. Pt uses a CPAP at night however, denies any other DME at baseline. Pt owns a FWW. Pt's spouse and adult children are stated as his support system. Pt is retired and receives SSI monthly deposits. No substance use or mental health concerns were noted. Pt denies having an advance directive and declined AD packet at this time. Pt has SCP insurance. Pt will have transportation home from spouse upon DC.  Information Source  Orientation Level: Oriented X4  Information Given By: Spouse  Informant's Name: Preston  Who is responsible for making decisions for patient? : Patient    Readmission Evaluation  Is this a readmission?: No    Elopement Risk  Legal Hold: No  Ambulatory or Self Mobile in Wheelchair: Yes  Disoriented: No  Psychiatric Symptoms: None  History of Wandering: No  Elopement this Admit: No  Vocalizing Wanting to Leave: No  Displays Behaviors, Body Language Wanting to Leave: No-Not at Risk for Elopement  Elopement Risk: Not at Risk for Elopement    Interdisciplinary Discharge Planning  Lives with - Patient's Self Care Capacity: Spouse  Patient or legal guardian wants to designate a caregiver: No  Support Systems: Family Member(s), Children, Spouse / Significant Other  Housing / Facility: 1 Story House  Prior Services: Home-Independent    Discharge Preparedness  What is your plan after discharge?: Home health care  What are your discharge supports?: Spouse, Child  Prior Functional Level: Ambulatory, Drives Self, Independent with Activities of Daily Living, Independent with Medication Management (Uses CPAP)  Difficulity  with ADLs: None  Difficulity with IADLs: None    Functional Assesment  Prior Functional Level: Ambulatory, Drives Self, Independent with Activities of Daily Living, Independent with Medication Management (Uses CPAP)    Finances  Financial Barriers to Discharge: No  Prescription Coverage: Yes    Vision / Hearing Impairment  Vision Impairment : Yes  Right Eye Vision: Wears Glasses, Impaired  Left Eye Vision: Wears Glasses, Impaired  Hearing Impairment : Yes  Hearing Impairment: Both Ears, Hearing Device(s) Available    Advance Directive  Advance Directive?: None  Advance Directive offered?: AD Booklet refused    Domestic Abuse  Physical Abuse or Sexual Abuse: No  Verbal Abuse or Emotional Abuse: No  Possible Abuse/Neglect Reported to:: Not Applicable    Psychological Assessment  History of Substance Abuse: None  History of Psychiatric Problems: No  Non-compliant with Treatment: No  Newly Diagnosed Illness: No    Discharge Risks or Barriers  Discharge risks or barriers?: No    Anticipated Discharge Information  Discharge Disposition: D/T to home under A care in anticipation of covered skilled care (06)

## 2024-12-19 NOTE — ASSESSMENT & PLAN NOTE
Likely stress-induced in setting of steroid use  Procalcitonin negative, remained afebrile  Chest x-ray unremarkable  No concern for infection  Continue monitor

## 2024-12-19 NOTE — DISCHARGE PLANNING
Case Management Discharge Planning    Admission Date: 12/17/2024  GMLOS: 3.5  ALOS: 2    6-Clicks ADL Score: 19  6-Clicks Mobility Score: 18      Anticipated Discharge Dispo: Discharge Disposition: D/T to home under HHA care in anticipation of covered skilled care (06)    DME Needed: No    Action(s) Taken: Pt was discussed during IDT rounds. PT is recommending HH upon DC. LMSW requested HH order from provider. Choice obtained by TCN with SCP for 1) [facility]. Choice form was faxed to DPA. Pt is pending pain control and wean O2 prior to DC.     Escalations Completed: None    Medically Clear: No    Next Steps: LMSW to follow for any additional CM needs.    Barriers to Discharge: Medical clearance    Is the patient up for discharge tomorrow: Potentially.

## 2024-12-19 NOTE — PROGRESS NOTES
0715: Report received from FLORENCE SHIELDS. Assumed care of patient. Patient resting in bed. States pain is 1/10, patient repositioned for comfort.

## 2024-12-19 NOTE — THERAPY
Physical Therapy   Initial Evaluation     Patient Name: Raf Spear  Age:  69 y.o., Sex:  male  Medical Record #: [id number]  Today's Date: 12/18/2024     Precautions  Precautions: (P) Fall Risk;Swallow Precautions;Cervical Collar  ;Spinal / Back Precautions   Comments: (P) aspen collar AAT, may remove in bed and shower; hemovac and MARKELL drain    Assessment  Patient is 69 y.o. male with C3-T1 ACIF  Plan    Physical Therapy Initial Treatment Plan   Treatment Plan : (P) Bed Mobility, Gait Training, Neuro Re-Education / Balance, Stair Training, Therapeutic Activities, Therapeutic Exercise  Treatment Frequency: (P) 5 Times per Week  Duration: (P) Until Therapy Goals Met    DC Equipment Recommendations: (P) None  Discharge Recommendations: (P) Recommend home health for continued physical therapy services       Subjective    ***     Objective       12/18/24 1610   Precautions   Precautions Fall Risk;Swallow Precautions;Cervical Collar  ;Spinal / Back Precautions    Comments aspen collar AAT, may remove in bed and shower; hemovac and MARKELL drain   Pain 0 - 10 Group   Location Neck   Location Orientation Anterior;Posterior   Description Aching   Therapist Pain Assessment Post Activity Pain Same as Prior to Activity;2   Prior Living Situation   Prior Services Home-Independent   Housing / Facility 1 Story House   Steps Into Home 0   Steps In Home 1  (to laundry room)   Equipment Owned Front-Wheel Walker   Lives with - Patient's Self Care Capacity Spouse   Prior Level of Functional Mobility   Bed Mobility Independent   Transfer Status Independent   Ambulation Independent   Ambulation Distance community   Assistive Devices Used None   Stairs Independent   History of Falls   History of Falls No   Cognition    Cognition / Consciousness X   Level of Consciousness Alert   Safety Awareness Impaired   New Learning Impaired   Attention Impaired   Comments pleasant, cooperative; a little foggy and forgetful   Strength Lower Body   Lower  Body Strength  X   Gross Strength Generalized Weakness, Equal Bilaterally   Sensation Lower Body   Lower Extremity Sensation   X   Comments reports sensation in bottom of feet is improved following surgery, that he hasn't been able to feel that area for a long time   Lower Body Muscle Tone   Comments WFL for mobility   Coordination Lower Body    Comments WFL for mobility   Balance Assessment   Sitting Balance (Static) Fair +   Sitting Balance (Dynamic) Fair   Standing Balance (Static) Fair   Standing Balance (Dynamic) Fair -   Weight Shift Sitting Fair   Weight Shift Standing Fair   Comments with FWW   Bed Mobility    Supine to Sit Standby Assist   Sit to Supine Standby Assist   Scooting Standby Assist   Rolling Standby Assist   Comments instructed log roll; needs review   Gait Analysis   Gait Level Of Assist Standby Assist   Assistive Device Front Wheel Walker   Distance (Feet) 100   # of Times Distance was Traveled 1   Deviation Decreased Heel Strike;Decreased Toe Off;Bradykinetic   # of Stairs Climbed 0   Weight Bearing Status no restriction   Comments a little weaving; no LOB   Functional Mobility   Sit to Stand Standby Assist   Bed, Chair, Wheelchair Transfer Standby Assist   Transfer Method Stand Step   Mobility supine->sit EOB->stand->amb->chair   Comments cues for hand placement   6 Clicks Assessment - How much HELP from from another person do you currently need... (If the patient hasn't done an activity recently, how much help from another person do you think he/she would need if he/she tried?)   Turning from your back to your side while in a flat bed without using bedrails? 3   Moving from lying on your back to sitting on the side of a flat bed without using bedrails? 3   Moving to and from a bed to a chair (including a wheelchair)? 3   Standing up from a chair using your arms (e.g., wheelchair, or bedside chair)? 3   Walking in hospital room? 3   Climbing 3-5 steps with a railing? 3   6 clicks Mobility  Score 18   Activity Tolerance   Sitting in Chair left up in chair   Sitting Edge of Bed 2 min approx   Standing 5 min approx   Comments tolerated well   Short Term Goals    Short Term Goal # 1 Pt will perform supine<->sit with supv within 6 visits in order to return home   Short Term Goal # 2 Pt will transfer bed<->chair with FWW and supv within 6 visits in order to return home   Short Term Goal # 3 Pt will amb 300' with FWW and supv within 6 visits in order to return home   Education Group   Education Provided Role of Physical Therapist;Spine Precautions   Spine Precautions Patient Response Patient;Acceptance;Explanation;Handout;Verbal Demonstration   Role of Physical Therapist Patient Response Patient;Acceptance;Explanation;Verbal Demonstration   Physical Therapy Initial Treatment Plan    Treatment Plan  Bed Mobility;Gait Training;Neuro Re-Education / Balance;Stair Training;Therapeutic Activities;Therapeutic Exercise   Treatment Frequency 5 Times per Week   Duration Until Therapy Goals Met   Problem List    Problems Pain;Impaired Bed Mobility;Impaired Transfers;Impaired Ambulation;Functional Strength Deficit;Impaired Balance;Decreased Activity Tolerance;Safety Awareness Deficits / Cognition;Limited Knowledge of Post-Op Precautions   Anticipated Discharge Equipment and Recommendations   DC Equipment Recommendations None   Discharge Recommendations Recommend home health for continued physical therapy services

## 2024-12-19 NOTE — CARE PLAN
The patient is Stable - Low risk of patient condition declining or worsening    Shift Goals  Clinical Goals: Pain management, mobility  Patient Goals: Pain control, comfort, POC updates  Family Goals: POC updates    Progress made toward(s) clinical / shift goals:        Problem: Pain - Standard  Goal: Alleviation of pain or a reduction in pain to the patient’s comfort goal  Outcome: Progressing     Problem: Fall Risk  Goal: Patient will remain free from falls  Outcome: Progressing  Note: Bed alarm on, bed locked and in lowest position. Call light within reach.      Problem: Knowledge Deficit - Standard  Goal: Patient and family/care givers will demonstrate understanding of plan of care, disease process/condition, diagnostic tests and medications  Outcome: Progressing       Patient is not progressing towards the following goals:

## 2024-12-19 NOTE — THERAPY
Physical Therapy   Initial Evaluation     Patient Name: Raf Spear  Age:  69 y.o., Sex:  male  Medical Record #: [id number]  Today's Date: 12/18/2024     Precautions  Precautions: Fall Risk;Swallow Precautions;Cervical Collar  ;Spinal / Back Precautions   Comments: aspen collar AAT, may remove in bed and shower; hemovac and MARKELL drain    Assessment  Patient is 69 y.o. male with anterior fusion C3-T1 with posterior laminectomy 12/17. Pt reports he was independent with self care and mobility at baseline sans AD, and lives with supportive spouse. Today, he was receptive to education for cervical precautions and handout. He required cues and SBA with bed mobility, SBA with transfers and amb with FWW. He will benefit from continued PT to ensure familiarity with log roll and safety with mobility. Anticipate pt to be able to D/c home with home health PT once demonstrating supv level with mobility.    Plan    Physical Therapy Initial Treatment Plan   Treatment Plan : Bed Mobility, Gait Training, Neuro Re-Education / Balance, Stair Training, Therapeutic Activities, Therapeutic Exercise  Treatment Frequency: 5 Times per Week  Duration: Until Therapy Goals Met    DC Equipment Recommendations: None  Discharge Recommendations: Recommend home health for continued physical therapy services       Subjective    Pt stating it feels good to be sitting up in chair.     Objective       12/18/24 1610   Precautions   Precautions Fall Risk;Swallow Precautions;Cervical Collar  ;Spinal / Back Precautions    Comments aspen collar AAT, may remove in bed and shower; hemovac and MARKELL drain   Pain 0 - 10 Group   Location Neck   Location Orientation Anterior;Posterior   Description Aching   Therapist Pain Assessment Post Activity Pain Same as Prior to Activity;2   Prior Living Situation   Prior Services Home-Independent   Housing / Facility 1 Story House   Steps Into Home 0   Steps In Home 1  (to laundry room)   Equipment Owned Front-Wheel Walker    Lives with - Patient's Self Care Capacity Spouse   Prior Level of Functional Mobility   Bed Mobility Independent   Transfer Status Independent   Ambulation Independent   Ambulation Distance community   Assistive Devices Used None   Stairs Independent   History of Falls   History of Falls No   Cognition    Cognition / Consciousness X   Level of Consciousness Alert   Safety Awareness Impaired   New Learning Impaired   Attention Impaired   Comments pleasant, cooperative; a little foggy and forgetful   Strength Lower Body   Lower Body Strength  X   Gross Strength Generalized Weakness, Equal Bilaterally   Sensation Lower Body   Lower Extremity Sensation   X   Comments reports sensation in bottom of feet is improved following surgery, that he hasn't been able to feel that area for a long time   Lower Body Muscle Tone   Comments WFL for mobility   Coordination Lower Body    Comments WFL for mobility   Balance Assessment   Sitting Balance (Static) Fair +   Sitting Balance (Dynamic) Fair   Standing Balance (Static) Fair   Standing Balance (Dynamic) Fair -   Weight Shift Sitting Fair   Weight Shift Standing Fair   Comments with FWW   Bed Mobility    Supine to Sit Standby Assist   Sit to Supine Standby Assist   Scooting Standby Assist   Rolling Standby Assist   Comments instructed log roll; needs review   Gait Analysis   Gait Level Of Assist Standby Assist   Assistive Device Front Wheel Walker   Distance (Feet) 100   # of Times Distance was Traveled 1   Deviation Decreased Heel Strike;Decreased Toe Off;Bradykinetic   # of Stairs Climbed 0   Weight Bearing Status no restriction   Comments a little weaving; no LOB   Functional Mobility   Sit to Stand Standby Assist   Bed, Chair, Wheelchair Transfer Standby Assist   Transfer Method Stand Step   Mobility supine->sit EOB->stand->amb->chair   Comments cues for hand placement   6 Clicks Assessment - How much HELP from from another person do you currently need... (If the patient  hasn't done an activity recently, how much help from another person do you think he/she would need if he/she tried?)   Turning from your back to your side while in a flat bed without using bedrails? 3   Moving from lying on your back to sitting on the side of a flat bed without using bedrails? 3   Moving to and from a bed to a chair (including a wheelchair)? 3   Standing up from a chair using your arms (e.g., wheelchair, or bedside chair)? 3   Walking in hospital room? 3   Climbing 3-5 steps with a railing? 3   6 clicks Mobility Score 18   Activity Tolerance   Sitting in Chair left up in chair   Sitting Edge of Bed 2 min approx   Standing 5 min approx   Comments tolerated well   Short Term Goals    Short Term Goal # 1 Pt will perform supine<->sit with supv within 6 visits in order to return home   Short Term Goal # 2 Pt will transfer bed<->chair with FWW and supv within 6 visits in order to return home   Short Term Goal # 3 Pt will amb 300' with FWW and supv within 6 visits in order to return home   Education Group   Education Provided Role of Physical Therapist;Spine Precautions   Spine Precautions Patient Response Patient;Acceptance;Explanation;Handout;Verbal Demonstration   Role of Physical Therapist Patient Response Patient;Acceptance;Explanation;Verbal Demonstration   Physical Therapy Initial Treatment Plan    Treatment Plan  Bed Mobility;Gait Training;Neuro Re-Education / Balance;Stair Training;Therapeutic Activities;Therapeutic Exercise   Treatment Frequency 5 Times per Week   Duration Until Therapy Goals Met   Problem List    Problems Pain;Impaired Bed Mobility;Impaired Transfers;Impaired Ambulation;Functional Strength Deficit;Impaired Balance;Decreased Activity Tolerance;Safety Awareness Deficits / Cognition;Limited Knowledge of Post-Op Precautions   Anticipated Discharge Equipment and Recommendations   DC Equipment Recommendations None   Discharge Recommendations Recommend home health for continued  physical therapy services

## 2024-12-19 NOTE — CARE PLAN
The patient is Stable - Low risk of patient condition declining or worsening    Shift Goals  Clinical Goals: pain mgmt, mobility, drain output  Patient Goals: pain control, rest, comfort  Family Goals: not present    Progress made toward(s) clinical / shift goals:        Problem: Safety  Goal: Will remain free from injury  Outcome: Progressing  Goal: Will remain free from falls  Outcome: Progressing     Problem: Pain Management  Goal: Pain level will decrease to patient's comfort goal  Outcome: Progressing     Problem: Pain - Standard  Goal: Alleviation of pain or a reduction in pain to the patient’s comfort goal  Outcome: Progressing     Problem: Fall Risk  Goal: Patient will remain free from falls  Outcome: Progressing       Patient is not progressing towards the following goals:

## 2024-12-19 NOTE — DISCHARGE PLANNING
ATTN: Case Management  RE: Referral for Home Health    As of 12/19/24, we have accepted the Home Health referral for the patient listed above.    [facility]  will contact the patient within 48 hours. If you have any questions or concerns regarding the patient’s transition to Home Health, please do not hesitate to contact us at [phone].      We look forward to collaborating with you,  [facility] Team

## 2024-12-19 NOTE — PROGRESS NOTES
"Hospital Medicine Daily Progress Note    Date of Service  12/19/2024    Chief Complaint  Raf Spear is a 69 y.o. male admitted 12/17/2024 with cervical stenosis    Hospital Course  69 male with past medical history of diabetes mellitus, ANNE, gout, followed by lymphoma, cervical spinal stenosis underwent cervical discectomy.  Medicine team consulted for pain management and postoperative hypoxia.    Interval Problem Update    12/19/2024  Seen and examined bedside  Vitals been stable  On 1.5 L oxygen saturating over 90%  Reports of some \"dry cough  Back pain improving  Labs noted leukocytosis white count 14.3, hemoglobin 9.8, chemistry sodium 136 potassium 4.4, renal function is stable .  Consult note reviewed, meds reviewed, chart reviewed      Continue Norvasc, metoprolol  On methocarbamol, gabapentin  Requiring IV narcotic pain management, monitor for toxicity  Aggressive incentive spirometry  Get chest x-ray for evaluation of cough  Check procalcitonin to  rule out pneumonia  PT/OT recommending home health.  Case discussed with neurosurgery NP Summer    I had extensive discussion with patient's spouse and daughter regarding the current medical status, treatment plan and prognosis.  did wear briefed on the patient's condition including recent changes or developments .  Updated with recent test results vital signs and symptoms.  Current treatment plan was explained in details including medications, therapies.          Code Status  Full Code      I have placed the appropriate orders for post-discharge needs.    Review of Systems  Review of Systems   Respiratory:  Positive for cough. Negative for sputum production and shortness of breath.    Musculoskeletal:  Positive for back pain.        Physical Exam  Temp:  [36.1 °C (97 °F)-36.8 °C (98.2 °F)] 36.1 °C (97 °F)  Pulse:  [83-90] 90  Resp:  [15-18] 17  BP: (113-138)/(61-85) 138/85  SpO2:  [92 %-95 %] 93 %    Physical Exam  Cardiovascular:      Rate and Rhythm: " Normal rate.      Pulses: Normal pulses.   Pulmonary:      Effort: Pulmonary effort is normal. No respiratory distress.      Breath sounds: No wheezing.   Abdominal:      General: Abdomen is flat.   Musculoskeletal:      Right lower leg: No edema.      Left lower leg: No edema.      Comments: Upper back surgical dressing noted   Neurological:      Mental Status: He is alert.      Comments: Motor strength 5 out of 5 all extremities, sensory intact         Fluids    Intake/Output Summary (Last 24 hours) at 12/19/2024 1224  Last data filed at 12/19/2024 0400  Gross per 24 hour   Intake 240 ml   Output 40 ml   Net 200 ml        Laboratory  Recent Labs     12/18/24  0347 12/19/24  0033   WBC 15.3* 14.3*   RBC 4.90 4.86   HEMOGLOBIN 10.1* 9.8*   HEMATOCRIT 33.8* 33.2*   MCV 69.0* 68.3*   MCH 20.6* 20.2*   MCHC 29.9* 29.5*   RDW 45.1 44.5   PLATELETCT 219 205   MPV 9.5 9.5     Recent Labs     12/18/24  0347 12/19/24  0033   SODIUM 137 136   POTASSIUM 4.6 4.4   CHLORIDE 105 105   CO2 20 22   GLUCOSE 159* 134*   BUN 16 18   CREATININE 0.75 0.89   CALCIUM 7.7* 8.2*                   Imaging  DX-O-ARM   Final Result      Portable O-arm utilized for 3 seconds.         INTERPRETING LOCATION: [address] [place] [address], [id number]      DX-CERVICAL SPINE-2 OR 3 VIEWS   Final Result      Digitized intraoperative radiograph is submitted for review. This examination is not for diagnostic purpose but for guidance during a surgical procedure. Please see the patient's chart for full procedural details.         INTERPRETING LOCATION: [address] [place] [address], [id number]      DX-PORTABLE FLUORO > 1 HOUR   Final Result      Portable fluoroscopy utilized for 26 seconds.         INTERPRETING LOCATION: [address] [place] [address], [id number]      DX-CHEST-LIMITED (1 VIEW)    (Results Pending)        Assessment/Plan  * Cervical spinal stenosis- (present on admission)  Assessment & Plan  Patient underwent laminectomy and discectomy with neurosurgery 12/17/2024.  Pain  management  Drain removed  PT/OT recommending home health      Leucocytosis  Assessment & Plan  Likely stress-induced  Improving   Check procalcitonin,  Does report of some cough, get chest x-ray to rule out right pneumonia  Monitor white count closely      Postoperative hypoxia- (present on admission)  Assessment & Plan  Patient requiring 2 L nasal cannula.  Does not require oxygen at baseline.  Likely postoperative hypoxia.  Patient was in the operating room for approximately 6 hours.    -RT protocol  -Supplemental oxygen as needed  -Wean oxygen as tolerated  -Incentive spirometry    12/19/2024  Requiring IV narcotic pain management, monitor for toxicity  Aggressive incentive spirometry  Get chest x-ray for evaluation of cough  Check procalcitonin to rule out pneumonia  PT/OT recommending home health.  Case discussed with neurosurgery NP Summer    Obstructive sleep apnea syndrome- (present on admission)  Assessment & Plan  -CPAP    CECILIA (generalized anxiety disorder)- (present on admission)  Assessment & Plan  -Continue home sertraline    Gastroesophageal reflux disease without esophagitis- (present on admission)  Assessment & Plan  -Continue home omeprazole    Type 2 diabetes mellitus with microalbuminuria, without long-term current use of insulin (HCC)- (present on admission)  Assessment & Plan  Hemoglobin A1c 6.5%.    -Hypoglycemia protocol  -Diabetic diet  -SSI    12/19/2024  Fingerstick remained stable in 140s    Essential hypertension- (present on admission)  Assessment & Plan  -Continue home valsartan, metoprolol, amlodipine         VTE prophylaxis: SCD    I have performed a physical exam and reviewed and updated ROS and Plan today (12/19/2024). In review of yesterday's note (12/18/2024), there are no changes except as documented above.       Greater than 52 minutes spent preparing to see patient (e.g. review of tests) obtaining and/or reviewing separately obtained history. Performing a medically appropriate  examination and/ evaluation.  Counseling and educating the patient/family/caregiver.  Ordering medications, tests, or procedures.  Referring and communicating with other health care professionals.  Documenting clinical information in EPIC.  Independently interpreting results and communicating results to patient/family/caregiver.  Care coordination.

## 2024-12-19 NOTE — FACE TO FACE
Face to Face Supporting Documentation - Home Health    The encounter with this patient was in whole or in part the primary reason for home health admission.    Date of encounter:   Patient:                    MRN:                       YOB: 2026  Raf Spear  [id number]  1955     Home health to see patient for:  Physical Therapy evaluation and treatment    Skilled need for:  Surgical Aftercare ambulating and unsteady gait    Skilled nursing interventions to include:  Comment: none    Homebound status evidenced by:  Need the aid of supportive devices such as crutches, canes, wheelchairs or walkers. Leaving home requires a considerable and taxing effort. There is a normal inability to leave the home.    Community Physician to provide follow up care: Ryan Carranza M.D.     Optional Interventions? Yes, Details: as needed      I certify the face to face encounter for this home health care referral meets the CMS requirements and the encounter/clinical assessment with the patient was, in whole, or in part, for the medical condition(s) listed above, which is the primary reason for home health care. Based on my clinical findings: the service(s) are medically necessary, support the need for home health care, and the homebound criteria are met.  I certify that this patient has had a face to face encounter by myself.  Kamilah Guido - DON: [phone]

## 2024-12-19 NOTE — PROGRESS NOTES
Neurosurgery Progress Note    Subjective:  No acute events overnight  Having headache this am, primarily starting in the neck, has not tried pain medication for this.  Having some posterior incisional pain  Feels n/t greatly improved post op    Exam:  A&O  PERRL  WEEKS with 5/5 strength  SILT. Some tingling to fingers and bottom of his feet, improved    Anterior incision and posterior dressing c/d/I  Hvac output 60mL/overnight, sanguineous  Dwight output 70mL/overnight, serosanguineous    BP  Min: 113/61  Max: 138/85  Pulse  Av.4  Min: 83  Max: 90  Resp  Av  Min: 14  Max: 18  Temp  Av.6 °C (97.9 °F)  Min: 36.1 °C (97 °F)  Max: 36.8 °C (98.2 °F)  Monitored Temp 2  Av.8 °C (98.2 °F)  Min: 36.8 °C (98.2 °F)  Max: 36.8 °C (98.2 °F)  SpO2  Av.7 %  Min: 92 %  Max: 95 %    No data recorded    Recent Labs     24  0347 24  0033   WBC 15.3* 14.3*   RBC 4.90 4.86   HEMOGLOBIN 10.1* 9.8*   HEMATOCRIT 33.8* 33.2*   MCV 69.0* 68.3*   MCH 20.6* 20.2*   MCHC 29.9* 29.5*   RDW 45.1 44.5   PLATELETCT 219 205   MPV 9.5 9.5     Recent Labs     24  0347 24  0033   SODIUM 137 136   POTASSIUM 4.6 4.4   CHLORIDE 105 105   CO2 20 22   GLUCOSE 159* 134*   BUN 16 18   CREATININE 0.75 0.89   CALCIUM 7.7* 8.2*               Intake/Output                         24 - 24 0659 24 - 24 0659      Total  Total                 Intake    P.O.  480  -- 480  --  -- --    P.O. 480 -- 480 -- -- --    Total Intake 480 -- 480 -- -- --       Output    Urine  --  -- --  --  -- --    Number of Times Voided 1 x 2 x 3 x -- -- --    Drains  90  40 130  --  -- --    Output (mL) ([REMOVED] Closed/Suction Drain 1 Right Neck Marcus Yadav 7 Fr. 24) 30 40 70 -- -- --    Output (mL) ([REMOVED] Closed/Suction Drain 1 Neck Hemovac 24) 60 0 60 -- -- --    Total Output 90 40 130 -- -- --       Net I/O     390 -40 350 -- -- --               Intake/Output Summary (Last 24 hours) at 12/19/2024 1056  Last data filed at 12/19/2024 0400  Gross per 24 hour   Intake 240 ml   Output 130 ml   Net 110 ml             Nozin nasal  swab  1 Applicator BID    Respiratory Therapy Consult   Continuous RT    oxyCODONE immediate-release  10 mg Q3HRS PRN    oxyCODONE immediate-release  5 mg Q3HRS PRN    morphine injection  2 mg Q3HRS PRN    insulin lispro  1-6 Units 4X/DAY ACHS    And    dextrose bolus  25 g Q15 MIN PRN    amLODIPine  7.5 mg DAILY    metoprolol SR  50 mg DAILY    omeprazole  20 mg BID    sertraline  50 mg DAILY    valsartan  320 mg DAILY    Pharmacy Consult Request  1 Each PHARMACY TO DOSE    MD ALERT...DO NOT ADMINISTER NSAIDS or ASPIRIN unless ORDERED By Neurosurgery  1 Each PRN    docusate sodium  100 mg BID    senna-docusate  1 Tablet Nightly    senna-docusate  1 Tablet Q24HRS PRN    polyethylene glycol/lytes  1 Packet BID PRN    magnesium hydroxide  30 mL QDAY PRN    bisacodyl  10 mg Q24HRS PRN    acetaminophen  1,000 mg Q6HRS    Followed by    [START ON 12/23/2024] acetaminophen  1,000 mg Q6HRS PRN    diphenhydrAMINE  25 mg Q6HRS PRN    Or    diphenhydrAMINE  25 mg Q6HRS PRN    ondansetron  4 mg Q4HRS PRN    ondansetron  4 mg Q4HRS PRN    methocarbamol  750 mg Q8HRS    labetalol  10 mg Q HOUR PRN    hydrALAZINE  10 mg Q HOUR PRN    cloNIDine  0.1 mg Q4HRS PRN    benzocaine-menthol  1 Lozenge Q2HRS PRN    calcium carbonate  500 mg TID PRN    gabapentin  300 mg TID       Assessment and Plan:  Hospital day #3  POD #2   C5-6 anterior corpectomy, C3-T1 posterior fusion, C4-7 lami  Prophylactic anticoagulation: no         Start date/time: tbd     Brain Compression: No    - Discussed pain management and need to stay on top of pain, verbalizes understanding and willing to take medication as needed  - Dc hvac and MARKELL drain today  - Work with PT/OT and OOB as much as tolerated  - Will increase muscle relaxer for better control of neck  pain/spasms  - Possibly home this afternoon/evening, likely home in am    Please call with any questions    Kamilah Guido

## 2024-12-19 NOTE — THERAPY
"Speech Language Pathology   Daily Treatment     Patient Name: Raf Spear  AGE:  69 y.o., SEX:  male  Medical Record #: [id number]  Date of Service: 12/19/2024      Precautions:  Precautions: Fall Risk, Swallow Precautions, Cervical Collar  , Spinal / Back Precautions        Pt is a 70 y/o male admitted yesterday for planned cervical discectomy and laminectomy d/t spinal stenosis. S/p anterior C5-C6 ACDF and posterior C3-T1 lami surgery 12/17 and now with post-operative hypoxia.      PMHx: BCC, diabetes, HLD, HTN, sleep apnea.     Interim CXR clear.     Subjective:  Patient seen this date for dysphagia management per RN request. Patient's SO and daughter at bedside. Patient sleeping but easily wakened. He was agreeable to session, and positioned to upright in bed. He reported increased swallowing difficulty this date and voice changes.       Assessment:  Muffled vocal quality. PO presentations of thin liquids (TN0) cup, liquidized solids (LQ3), and regular solids (RG7). Adequate oral bolus acceptance/containment. Complete AP transfer without notable oral bolus residue upon oral inspection. Adequate bite with functional mastication of solids. No cough/throat clear appreciated with PO. Vocal quality remained stable throughout PO intake. 2-4 swallows completed per bolus. Increased globus sensation and swallow amount with liquids compared to solids. Discussed anticipated swallow changes with post-operative swelling. Discussed possibility for further decline with increased swelling and signs that would be concerning (e.g., persistent coughing/choking, unable to clear \"sticking\" sensation with strategies). Patient expressed understanding. RN updated.       Clinical Impressions:   Patient presents with clinical indicators of pharyngeal inefficiency, concerning for pharyngeal dysphagia. Muffled vocal quality. Presentation is consistent with post-operative swelling from cervical surgery and appears well managed with " "compensatory strategies at this time. Recommend continuation of regular diet. Service will continue to follow to maximize swallowing outcomes.        Recommendations  Diet Consistency: Regular solids, thin liquids  Instrumentation: Instrumental swallow study pending clinical progress  Medication: Whole with puree, Crush with pudding/puree, as appropriate  Supervision: Distant supervision - check on patient 2-3 times per meal  Positioning: Fully upright and midline during oral intake  Risk Management : Small bites/sips, Alternate bites and sips, Slow rate of intake, Reduce environmental distractions, Physical mobility, as tolerated  Oral Care: BID                     SLP Treatment Plan  Treatment Plan: Dysphagia Treatment  SLP Frequency: 3x Per Week  Estimated Duration: Until Therapy Goals Met      Anticipated Discharge Needs  Discharge Recommendations: Anticipate that the patient will have no further speech therapy needs after discharge from the hospital  Therapy Recommendations Upon DC: Not Indicated      Patient / Family Goals  Patient / Family Goal #1: \"I'm doing a lot better than I was a few months ago\"  Goal #1 Outcome: Progressing as expected  Short Term Goals  Short Term Goal # 1: Patient will consume regular diet w/ thin liquids with no overt s/sx of aspiration or decline in respiratory status.  Goal Outcome # 1: Progressing as expected      Celia Alvarado SLP  "

## 2024-12-19 NOTE — THERAPY
"Physical Therapy   Daily Treatment     Patient Name: Raf Spear  Age:  69 y.o., Sex:  male  Medical Record #: [id number]  Today's Date: 12/19/2024     Precautions  Precautions: Fall Risk;Swallow Precautions;Spinal / Back Precautions   Comments: Aspen collar \"may remove in bed, may remove for shower\" per orders    Assessment    Pt agreeable to PT tx session, continues to be limited by orthostatic BP, grogginess, and decreased activity tolerance.  Pt mobilized with SBA as detailed below however further mobility deferred by therapist due to orthostatic hypotension.  While standing to take BP pt demo'd sways in balance in all directions, posterior lean & pelvic tilt.  Briefly educated pt's wife on how to wash & change pt's Aspen collar pads.  Educated pt on strategies to improve tolerance & reduce orthostatic hypotension including gradually raising HOB, sitting EOB or up in chair for meals.  Will continue to follow for skilled therapy.    Plan    Treatment Plan Status: Continue Current Treatment Plan  Type of Treatment: Bed Mobility, Gait Training, Neuro Re-Education / Balance, Stair Training, Therapeutic Activities, Therapeutic Exercise  Treatment Frequency: 5 Times per Week  Treatment Duration: Until Therapy Goals Met    DC Equipment Recommendations: None  Discharge Recommendations: Recommend home health for continued physical therapy services (Pending improvement of BP.)     Objective     12/19/24 1506   Precautions   Precautions Fall Risk;Swallow Precautions;Spinal / Back Precautions    Comments Aspen collar \"may remove in bed, may remove for shower\"   Vitals   Vitals Comments /71 sitting EOB, 90/70 standing; RN notified   Pain 0 - 10 Group   Therapist Pain Assessment Post Activity Pain Same as Prior to Activity;Nurse Notified;0   Cognition    Cognition / Consciousness X   Level of Consciousness Alert   Safety Awareness Impaired   New Learning Impaired   Attention Impaired   Comments Pleasant & " cooperative, groggy   Other Treatments   Other Treatments Provided Educated pt's wife briefly on fitting of Aspen collar and how to replace/wash pads.   Balance   Sitting Balance (Static) Fair +   Sitting Balance (Dynamic) Fair   Standing Balance (Static) Fair -   Standing Balance (Dynamic) Fair -   Weight Shift Sitting Fair   Weight Shift Standing Fair   Skilled Intervention Verbal Cuing;Tactile Cuing;Compensatory Strategies   Comments lateral sways in standing, posterior lean with posterior pelvic tilt at times; improved with cues   Bed Mobility    Supine to Sit Minimal Assist   Sit to Supine Standby Assist   Scooting Standby Assist   Rolling Standby Assist   Skilled Intervention Verbal Cuing;Tactile Cuing;Compensatory Strategies   Gait Analysis   Comments Gait deferred due to BP   Functional Mobility   Sit to Stand Standby Assist   Bed, Chair, Wheelchair Transfer (Deferred due to BP)   Mobility bed mobility, STS   Skilled Intervention Verbal Cuing;Tactile Cuing;Facilitation   Activity Tolerance   Sitting Edge of Bed 8 min   Standing 2-3 min   Comments limited by BP   Short Term Goals    Short Term Goal # 1 Pt will perform supine<->sit with supv within 6 visits in order to return home   Goal Outcome # 1 Progressing as expected   Short Term Goal # 2 Pt will transfer bed<->chair with FWW and supv within 6 visits in order to return home   Goal Outcome # 2 Progressing as expected   Short Term Goal # 3 Pt will amb 300' with FWW and supv within 6 visits in order to return home   Goal Outcome # 3 Progressing as expected   Physical Therapy Treatment Plan   Physical Therapy Treatment Plan Continue Current Treatment Plan

## 2024-12-20 ENCOUNTER — PHARMACY VISIT (OUTPATIENT)
Dept: PHARMACY | Facility: MEDICAL CENTER | Age: 69
End: 2024-12-20
Payer: COMMERCIAL

## 2024-12-20 VITALS
DIASTOLIC BLOOD PRESSURE: 87 MMHG | RESPIRATION RATE: 16 BRPM | WEIGHT: 207.45 LBS | BODY MASS INDEX: 29.7 KG/M2 | HEART RATE: 84 BPM | HEIGHT: 70 IN | SYSTOLIC BLOOD PRESSURE: 152 MMHG | OXYGEN SATURATION: 92 % | TEMPERATURE: 97.7 F

## 2024-12-20 LAB
ANISOCYTOSIS BLD QL SMEAR: ABNORMAL
BASOPHILS # BLD AUTO: 0.1 % (ref 0–1.8)
BASOPHILS # BLD: 0.02 K/UL (ref 0–0.12)
COMMENT 1642: NORMAL
EOSINOPHIL # BLD AUTO: 0 K/UL (ref 0–0.51)
EOSINOPHIL NFR BLD: 0 % (ref 0–6.9)
ERYTHROCYTE [DISTWIDTH] IN BLOOD BY AUTOMATED COUNT: 45.2 FL (ref 35.9–50)
GLUCOSE BLD STRIP.AUTO-MCNC: 164 MG/DL (ref 65–99)
GLUCOSE BLD STRIP.AUTO-MCNC: 174 MG/DL (ref 65–99)
HCT VFR BLD AUTO: 34.3 % (ref 42–52)
HGB BLD-MCNC: 9.8 G/DL (ref 14–18)
HYPOCHROMIA BLD QL SMEAR: ABNORMAL
IMM GRANULOCYTES # BLD AUTO: 0.12 K/UL (ref 0–0.11)
IMM GRANULOCYTES NFR BLD AUTO: 0.8 % (ref 0–0.9)
LYMPHOCYTES # BLD AUTO: 0.91 K/UL (ref 1–4.8)
LYMPHOCYTES NFR BLD: 6 % (ref 22–41)
MCH RBC QN AUTO: 19.8 PG (ref 27–33)
MCHC RBC AUTO-ENTMCNC: 28.6 G/DL (ref 32.3–36.5)
MCV RBC AUTO: 69.4 FL (ref 81.4–97.8)
MICROCYTES BLD QL SMEAR: ABNORMAL
MONOCYTES # BLD AUTO: 1.37 K/UL (ref 0–0.85)
MONOCYTES NFR BLD AUTO: 9.1 % (ref 0–13.4)
MORPHOLOGY BLD-IMP: NORMAL
NEUTROPHILS # BLD AUTO: 12.69 K/UL (ref 1.82–7.42)
NEUTROPHILS NFR BLD: 84 % (ref 44–72)
NRBC # BLD AUTO: 0 K/UL
NRBC BLD-RTO: 0 /100 WBC (ref 0–0.2)
PLATELET # BLD AUTO: 224 K/UL (ref 164–446)
PLATELET BLD QL SMEAR: NORMAL
PMV BLD AUTO: 9.8 FL (ref 9–12.9)
RBC # BLD AUTO: 4.94 M/UL (ref 4.7–6.1)
RBC BLD AUTO: PRESENT
WBC # BLD AUTO: 15.1 K/UL (ref 4.8–10.8)

## 2024-12-20 PROCEDURE — 92526 ORAL FUNCTION THERAPY: CPT

## 2024-12-20 PROCEDURE — 700102 HCHG RX REV CODE 250 W/ 637 OVERRIDE(OP)

## 2024-12-20 PROCEDURE — 700111 HCHG RX REV CODE 636 W/ 250 OVERRIDE (IP)

## 2024-12-20 PROCEDURE — 700105 HCHG RX REV CODE 258

## 2024-12-20 PROCEDURE — 85025 COMPLETE CBC W/AUTO DIFF WBC: CPT

## 2024-12-20 PROCEDURE — 82962 GLUCOSE BLOOD TEST: CPT

## 2024-12-20 PROCEDURE — A9270 NON-COVERED ITEM OR SERVICE: HCPCS

## 2024-12-20 PROCEDURE — 97165 OT EVAL LOW COMPLEX 30 MIN: CPT

## 2024-12-20 PROCEDURE — RXMED WILLOW AMBULATORY MEDICATION CHARGE

## 2024-12-20 PROCEDURE — 97535 SELF CARE MNGMENT TRAINING: CPT

## 2024-12-20 PROCEDURE — 99233 SBSQ HOSP IP/OBS HIGH 50: CPT | Performed by: STUDENT IN AN ORGANIZED HEALTH CARE EDUCATION/TRAINING PROGRAM

## 2024-12-20 RX ORDER — DOCUSATE SODIUM 100 MG/1
CAPSULE, LIQUID FILLED ORAL
Qty: 30 CAPSULE | Refills: 0 | OUTPATIENT
Start: 2024-12-20

## 2024-12-20 RX ORDER — METHOCARBAMOL 500 MG/1
1000 TABLET, FILM COATED ORAL EVERY 8 HOURS
Status: DISCONTINUED | OUTPATIENT
Start: 2024-12-20 | End: 2024-12-20 | Stop reason: HOSPADM

## 2024-12-20 RX ORDER — OXYCODONE AND ACETAMINOPHEN 5; 325 MG/1; MG/1
TABLET ORAL
Qty: 42 TABLET | Refills: 0 | OUTPATIENT
Start: 2024-12-20

## 2024-12-20 RX ORDER — METHYLPREDNISOLONE 4 MG/1
TABLET ORAL
Qty: 21 TABLET | Refills: 0 | OUTPATIENT
Start: 2024-12-20

## 2024-12-20 RX ORDER — SODIUM CHLORIDE 9 MG/ML
500 INJECTION, SOLUTION INTRAVENOUS ONCE
Status: COMPLETED | OUTPATIENT
Start: 2024-12-20 | End: 2024-12-20

## 2024-12-20 RX ORDER — METHOCARBAMOL 500 MG/1
TABLET, FILM COATED ORAL
Qty: 180 TABLET | Refills: 0 | OUTPATIENT
Start: 2024-12-20

## 2024-12-20 RX ADMIN — MAGNESIUM HYDROXIDE 30 ML: 1200 LIQUID ORAL at 04:41

## 2024-12-20 RX ADMIN — METHOCARBAMOL TABLETS 750 MG: 750 TABLET, COATED ORAL at 04:46

## 2024-12-20 RX ADMIN — ACETAMINOPHEN 1000 MG: 500 TABLET ORAL at 12:06

## 2024-12-20 RX ADMIN — AMLODIPINE BESYLATE 7.5 MG: 5 TABLET ORAL at 04:43

## 2024-12-20 RX ADMIN — METOPROLOL SUCCINATE 50 MG: 50 TABLET, EXTENDED RELEASE ORAL at 04:46

## 2024-12-20 RX ADMIN — SERTRALINE HYDROCHLORIDE 50 MG: 50 TABLET ORAL at 04:46

## 2024-12-20 RX ADMIN — DEXAMETHASONE SODIUM PHOSPHATE 4 MG: 4 INJECTION INTRA-ARTICULAR; INTRALESIONAL; INTRAMUSCULAR; INTRAVENOUS; SOFT TISSUE at 00:13

## 2024-12-20 RX ADMIN — OMEPRAZOLE 20 MG: 20 CAPSULE, DELAYED RELEASE ORAL at 04:45

## 2024-12-20 RX ADMIN — DOCUSATE SODIUM 100 MG: 100 CAPSULE, LIQUID FILLED ORAL at 04:45

## 2024-12-20 RX ADMIN — OXYCODONE 5 MG: 5 TABLET ORAL at 08:18

## 2024-12-20 RX ADMIN — ONDANSETRON 4 MG: 2 INJECTION INTRAMUSCULAR; INTRAVENOUS at 04:56

## 2024-12-20 RX ADMIN — INSULIN LISPRO 1 UNITS: 100 INJECTION, SOLUTION INTRAVENOUS; SUBCUTANEOUS at 12:08

## 2024-12-20 RX ADMIN — POLYETHYLENE GLYCOL 3350 1 PACKET: 17 POWDER, FOR SOLUTION ORAL at 04:40

## 2024-12-20 RX ADMIN — INSULIN LISPRO 1 UNITS: 100 INJECTION, SOLUTION INTRAVENOUS; SUBCUTANEOUS at 08:29

## 2024-12-20 RX ADMIN — VALSARTAN 320 MG: 80 TABLET ORAL at 04:44

## 2024-12-20 RX ADMIN — GABAPENTIN 300 MG: 300 CAPSULE ORAL at 04:44

## 2024-12-20 RX ADMIN — ACETAMINOPHEN 1000 MG: 500 TABLET ORAL at 04:44

## 2024-12-20 RX ADMIN — ACETAMINOPHEN 1000 MG: 500 TABLET ORAL at 00:15

## 2024-12-20 RX ADMIN — SODIUM CHLORIDE 500 ML: 9 INJECTION, SOLUTION INTRAVENOUS at 08:35

## 2024-12-20 RX ADMIN — DEXAMETHASONE SODIUM PHOSPHATE 4 MG: 4 INJECTION INTRA-ARTICULAR; INTRALESIONAL; INTRAMUSCULAR; INTRAVENOUS; SOFT TISSUE at 12:06

## 2024-12-20 RX ADMIN — DEXAMETHASONE SODIUM PHOSPHATE 4 MG: 4 INJECTION INTRA-ARTICULAR; INTRALESIONAL; INTRAMUSCULAR; INTRAVENOUS; SOFT TISSUE at 04:47

## 2024-12-20 RX ADMIN — GABAPENTIN 300 MG: 300 CAPSULE ORAL at 12:06

## 2024-12-20 RX ADMIN — OXYCODONE HYDROCHLORIDE 10 MG: 10 TABLET ORAL at 12:57

## 2024-12-20 ASSESSMENT — GAIT ASSESSMENTS: DISTANCE (FEET): 25

## 2024-12-20 ASSESSMENT — COGNITIVE AND FUNCTIONAL STATUS - GENERAL
DAILY ACTIVITIY SCORE: 23
HELP NEEDED FOR BATHING: A LITTLE
SUGGESTED CMS G CODE MODIFIER DAILY ACTIVITY: CI

## 2024-12-20 ASSESSMENT — ENCOUNTER SYMPTOMS
SPUTUM PRODUCTION: 0
COUGH: 1
BACK PAIN: 1
SHORTNESS OF BREATH: 0

## 2024-12-20 ASSESSMENT — PAIN DESCRIPTION - PAIN TYPE
TYPE: ACUTE PAIN;SURGICAL PAIN
TYPE: SURGICAL PAIN
TYPE: ACUTE PAIN;SURGICAL PAIN
TYPE: ACUTE PAIN;SURGICAL PAIN

## 2024-12-20 ASSESSMENT — ACTIVITIES OF DAILY LIVING (ADL): TOILETING: INDEPENDENT

## 2024-12-20 NOTE — PROGRESS NOTES
Neurosurgery Progress Note    Subjective:  No acute events overnight  Still having some neck pain/stiffness  Having some posterior incisional pain  Feels n/t greatly improved post op    Exam:  A&O  PERRL  WEEKS with 5/5 strength  SILT. Some tingling to fingers and bottom of his feet, improved    Anterior incision and posterior dressing c/d/I      BP  Min: 126/63  Max: 150/80  Pulse  Av.3  Min: 76  Max: 84  Resp  Avg: 15.3  Min: 14  Max: 16  Temp  Av.4 °C (97.5 °F)  Min: 36.2 °C (97.2 °F)  Max: 36.5 °C (97.7 °F)  SpO2  Av.8 %  Min: 92 %  Max: 97 %    No data recorded    Recent Labs     24  0033 24  0040   WBC 15.3* 14.3* 15.1*   RBC 4.90 4.86 4.94   HEMOGLOBIN 10.1* 9.8* 9.8*   HEMATOCRIT 33.8* 33.2* 34.3*   MCV 69.0* 68.3* 69.4*   MCH 20.6* 20.2* 19.8*   MCHC 29.9* 29.5* 28.6*   RDW 45.1 44.5 45.2   PLATELETCT 219 205 224   MPV 9.5 9.5 9.8     Recent Labs     247 24  0033   SODIUM 137 136   POTASSIUM 4.6 4.4   CHLORIDE 105 105   CO2 20 22   GLUCOSE 159* 134*   BUN 16 18   CREATININE 0.75 0.89   CALCIUM 7.7* 8.2*               Intake/Output                         24 - 24 0659 24 07 - 24 0659      Total  Total                 Intake    Total Intake -- -- -- -- -- --       Output    Urine  --  300 300  --  -- --    Number of Times Voided -- 1 x 1 x -- -- --    Urine Void (mL) -- 300 300 -- -- --    Total Output -- 300 300 -- -- --       Net I/O     -- -300 -300 -- -- --              Intake/Output Summary (Last 24 hours) at 2024 0957  Last data filed at 2024 0505  Gross per 24 hour   Intake --   Output 300 ml   Net -300 ml             methocarbamol  1,000 mg Q8HRS    dexamethasone  4 mg Q6HRS    Respiratory Therapy Consult   Continuous RT    oxyCODONE immediate-release  10 mg Q3HRS PRN    oxyCODONE immediate-release  5 mg Q3HRS PRN    morphine injection  2 mg Q3HRS PRN    insulin  lispro  1-6 Units 4X/DAY ACHS    And    dextrose bolus  25 g Q15 MIN PRN    amLODIPine  7.5 mg DAILY    metoprolol SR  50 mg DAILY    omeprazole  20 mg BID    sertraline  50 mg DAILY    valsartan  320 mg DAILY    Pharmacy Consult Request  1 Each PHARMACY TO DOSE    MD ALERT...DO NOT ADMINISTER NSAIDS or ASPIRIN unless ORDERED By Neurosurgery  1 Each PRN    docusate sodium  100 mg BID    senna-docusate  1 Tablet Nightly    senna-docusate  1 Tablet Q24HRS PRN    polyethylene glycol/lytes  1 Packet BID PRN    magnesium hydroxide  30 mL QDAY PRN    bisacodyl  10 mg Q24HRS PRN    acetaminophen  1,000 mg Q6HRS    Followed by    [START ON 12/23/2024] acetaminophen  1,000 mg Q6HRS PRN    diphenhydrAMINE  25 mg Q6HRS PRN    Or    diphenhydrAMINE  25 mg Q6HRS PRN    ondansetron  4 mg Q4HRS PRN    ondansetron  4 mg Q4HRS PRN    labetalol  10 mg Q HOUR PRN    hydrALAZINE  10 mg Q HOUR PRN    cloNIDine  0.1 mg Q4HRS PRN    benzocaine-menthol  1 Lozenge Q2HRS PRN    calcium carbonate  500 mg TID PRN    gabapentin  300 mg TID       Assessment and Plan:  Hospital day #4  POD #3   C5-6 anterior corpectomy, C3-T1 posterior fusion, C4-7 lami  Prophylactic anticoagulation: no         Start date/time: tbd     Brain Compression: No    - Having low BP with ambulation, will order 500mL bolus  - Work with PT/OT and OOB as much as tolerated  - Will increase muscle relaxer for better control of neck pain/spasms  - HH order placed   - Can dc home this afternoon if pain is controlled    Please call with any questions    Kamilah Guido

## 2024-12-20 NOTE — DISCHARGE PLANNING
Case Management Discharge Planning    Admission Date: 12/17/2024  GMLOS: 3.5  ALOS: 3    6-Clicks ADL Score: 19  6-Clicks Mobility Score: 18      Anticipated Discharge Dispo: Discharge Disposition: D/T to home under [facility] care in anticipation of covered skilled care (06)    DME Needed: No    Action(s) Taken: Pt was discussed during IDT rounds. Potential DC today, pending pain control.     UPDATE 1230  DC order has been placed. [facility]  has accepted pt and will follow up within 48 hours. LMSW met with pt at bedside to inform and provided 2nd IMM form. Form faxed to [facility]. Anticipating no further CM needs at this time.    Escalations Completed: None    Medically Clear: Yes    Next Steps: LMSW to follow for any additional CM needs.    Barriers to Discharge: None    Is the patient up for discharge tomorrow: No, today.

## 2024-12-20 NOTE — DISCHARGE SUMMARY
Discharge Summary    CHIEF COMPLAINT ON ADMISSION  No chief complaint on file.      Reason for Admission  Spinal stenosis in cervical region     Admission Date  12/17/2024    CODE STATUS  Full Code    HPI & HOSPITAL COURSE  This is a 69 y.o. male here with a previous medical history of cervical stenosis with myelopathy. He was admitted to the hospital on 12/17/2024. He and underwent a C4-5, C6-7 anterior cervical diskectomy for endplate preparation for arthrodesis and posterior laminectomy fand fusion at C4-5, C5-6, C6-7 with Dr. Merritt. He tolerated the procedure with no perioperative complications. HE is adequately voiding, tolerating his diet, and his pain is controlled. They have been cleared by PT and OT for discharge home with home health and use of a front wheel walker.    No notes on file    Therefore, he is discharged in good and stable condition to home with organized home healthcare and close outpatient follow-up.    The patient met 2-midnight criteria for an inpatient stay at the time of discharge.    Discharge Date  12/20/2024    FOLLOW UP ITEMS POST DISCHARGE  See discharge instrictions    DISCHARGE DIAGNOSES  Principal Problem:    Cervical spinal stenosis (POA: Yes)      Overview: Planning to have corpectomy with neurosurgery Dr. Migdalia Merritt.   Active Problems:    Essential hypertension (POA: Yes)      Overview:  has history of hypertension, currently on valsartan 320 mg, amlodipine       7.5 mg daily and metoprolol 50 mg daily. BP today: 132/80.  No chest pain,       palpitations, shortness of breath, lower leg swelling.    Type 2 diabetes mellitus with microalbuminuria, without long-term current use of insulin (HCC) (POA: Yes)      Overview:  He is on metformin 500 mg 2 times daily which she has been taking       recently.  He is on Jardiance 25 mg daily.              Lab Results       Component Value Date/Time        HBA1C 6.4 (H) 08/22/2024 11:30 AM           Gastroesophageal reflux disease  without esophagitis (POA: Yes)      Overview: Has history of reflux symptoms on omeprazole for 10 years.  Reports that       have never tried to taper off this medication.    CECILIA (generalized anxiety disorder) (POA: Yes)      Overview: Is currently on Zoloft 50 mg daily, doing well with this medication.  No       side effects.    Obstructive sleep apnea syndrome (POA: Yes)      Overview: Has obstructive sleep apnea, uses CPAP machine, doing well    Postoperative hypoxia (POA: Yes)    Leucocytosis (POA: Unknown)  Resolved Problems:    * No resolved hospital problems. *      FOLLOW UP  Future Appointments   Date Time Provider Department Center   1/13/2025 10:30 AM Monica Chauhan M.D. [facility] None   3/14/2025 11:40 AM Ryan Carranza M.D. [facility]     No follow-up provider specified.    MEDICATIONS ON DISCHARGE     Medication List        ASK your doctor about these medications        Instructions   acetaminophen 500 MG Tabs  Commonly known as: Tylenol   Take 500-1,000 mg by mouth every 6 hours as needed for Moderate Pain.  Dose: 500-1,000 mg     * amLODIPine 5 MG Tabs  Commonly known as: Norvasc   Take 1 Tablet by mouth every day. For high blood pressure  Dose: 5 mg     * amLODIPine 2.5 MG Tabs  Commonly known as: Norvasc   Take 1 Tablet by mouth every day. Along with amlodipine 5 mg for today of 7.5 mg/day for blood pressure  Dose: 2.5 mg     aspirin 81 MG EC tablet   Take 81 mg by mouth every day.  Dose: 81 mg     docusate sodium 100 MG Caps  Commonly known as: Colace   Doctor's comments: meds to beds  Take 1 capsule every day by oral route for 30 days.     fish oil 1000 MG Caps capsule   Take 1,000 mg by mouth every day.  Dose: 1,000 mg     glucosamine Sulfate 500 MG Caps   Take 500 mg by mouth every day.   Dose: 500 mg     Jardiance 25 MG Tabs  Generic drug: Empagliflozin   Doctor's comments: Please provide sample to patient  Take 1 Tablet by mouth every day.  Dose: 1 Tablet     metFORMIN 500 MG  Tabs  Commonly known as: Glucophage   Take 1 Tablet by mouth 2 times a day with meals.  Dose: 500 mg     methocarbamol 500 MG Tabs  Commonly known as: Robaxin   Doctor's comments: meds to beds  Take 2 tablets every 8 hours by oral route for 30 days.     methylPREDNISolone 4 MG Tbpk  Commonly known as: Medrol   Doctor's comments: meds to beds  Take 1 dose pk by oral route.     metoprolol SR 50 MG Tb24  Commonly known as: Toprol XL   Take 1 Tablet by mouth every day. For heart and blood pressure  Dose: 50 mg     MSM 1000 MG Caps   Take 1,000 mg by mouth every day.  Dose: 1,000 mg     MULTI-VITAMIN DAILY PO   Take 1 Tablet by mouth every day.  Dose: 1 Tablet     omeprazole 20 MG delayed-release capsule  Commonly known as: PriLOSEC   Take 1 capsule by mouth twice a day 30 min before meals     oxyCODONE-acetaminophen 5-325 MG Tabs  Commonly known as: Percocet   Doctor's comments: meds to beds  Take 1 tablet every 4-6 hours by oral route as needed for 7 days.     sertraline 50 MG Tabs  Commonly known as: Zoloft   Take 1 tablet by mouth every day.  Dose: 50 mg     valsartan 320 MG tablet  Commonly known as: Diovan   Take 1 Tablet by mouth every day.  Dose: 320 mg     Vitamin C CR 1000 MG Tbcr   Take 1,000 mg by mouth every day.  Dose: 1,000 mg           * This list has 2 medication(s) that are the same as other medications prescribed for you. Read the directions carefully, and ask your doctor or other care provider to review them with you.                  Allergies  No Known Allergies    DIET  Orders Placed This Encounter   Procedures    Diet Order Diet: Regular     Standing Status:   Standing     Number of Occurrences:   1     Order Specific Question:   Diet:     Answer:   Regular [1]       ACTIVITY  As tolerated.      LABORATORY  Lab Results   Component Value Date    SODIUM 136 12/19/2024    POTASSIUM 4.4 12/19/2024    CHLORIDE 105 12/19/2024    CO2 22 12/19/2024    GLUCOSE 134 (H) 12/19/2024    BUN 18 12/19/2024     CREATININE 0.89 12/19/2024        Lab Results   Component Value Date    WBC 15.1 (H) 12/20/2024    HEMOGLOBIN 9.8 (L) 12/20/2024    HEMATOCRIT 34.3 (L) 12/20/2024    PLATELETCT 224 12/20/2024        Total time of the discharge process exceeds 35 minutes.

## 2024-12-20 NOTE — PROGRESS NOTES
Pt discharged from unit. All belongings with pt. PIV removed. Discharge paperwork reviewed with patient, all questions answered, and paperwork signed. One copy with patient, and one copy kept to be scanned into patient's chart.   M2b delivered and reviewed.

## 2024-12-20 NOTE — DISCHARGE INSTRUCTIONS
Neurosurgery Instructions:  You will follow up with Kamilah CERDA at [facility] in 2 weeks as previously scheduled. Please call [phone] for clarification or to make changes to your appointment.  You will follow up with Dr. Merritt in 6 weeks for post-operative check.     Activity restrictions:   No pushing, pulling, or lifting greater than 10 pounds (about a gallon of milk).  No repetitive bending, twisting, lifting, or extending your arms over your head.  Ambulate as much is comfortable.  No driving for at least 2 weeks following surgery especially while taking narcotic medications.  You must wear your cervical collar when you are up and out of bed. You may remove it when you are in bed and when showering.     Incision care:  You may remove the aquacel dressing on post-op day #5 (Sunday 12/22). If the dressing rolls up where the incision is exposed or becomes dislodged, you can remove it and leave the incision open to air. You may shower starting today with the dressing in place.Do not let the water directly hit the incision, let the warm water and soap run over the incision, no rubbing or scrubbing. Pat the incision to dry and leave open to air. Do not apply creams, gels, lotions to the incision.   No soaking/submerging the incision - no pools, baths, hot tubs, rivers, lakes, etc for 6 weeks.     Medications:  Do not take non-steroidal anti-inflammatory medications (e.g. ibuprofen, Motrin, naproxen, Aleve, Advil, etc) or aspirin until cleared by Dr. Merritt.  You should take a stool softener (Colace) while you are taking narcotic medications, for at least 2 weeks after surgery. You can purchase this over-the-counter, but a prescription will also be sent in. If you are still constipated, you may take a laxative (e.g. senna, Miralax, milk of magnesia, etc) as needed.

## 2024-12-20 NOTE — PROGRESS NOTES
Hospital Medicine Daily Progress Note    Date of Service  12/20/2024    Chief Complaint  Raf Spear is a 69 y.o. male admitted 12/17/2024 with cervical stenosis    Hospital Course  69 male with past medical history of diabetes mellitus, ANNE, gout, followed by lymphoma, cervical spinal stenosis underwent cervical discectomy.  Medicine team consulted for pain management and postoperative hypoxia.  During hospital course his oxygen requirement improved to room air.  Repeat chest x-ray unremarkable, procalcitonin negative.  PT OT recommended home health.    Interval Problem Update    12/20/2024  Seen and examined at bedside  Vital remained stable  Daughter present at bedside  Patient denies any discomfort  Currently on room air saturating over 90%  Labs worsening leukocytosis, chemistry sodium 136, renal function remained stable, procalcitonin negative, blood sugar 174  Chest x-ray unremarkable for cardiopulmonary disease      Continue Norvasc, metoprolol  On methocarbamol, gabapentin  Requiring IV narcotic pain management, monitor for toxicity  Aggressive incentive spirometry  PT/OT recommending home health.  Case discussed with neurosurgery NP Summer  Patient is medically cleared to discharge from hospitalist perspective  Discharge plan discussed with patient's daughter at bedside all question answered      Code Status  Full Code      I have placed the appropriate orders for post-discharge needs.    Review of Systems  Review of Systems   Respiratory:  Positive for cough. Negative for sputum production and shortness of breath.    Musculoskeletal:  Positive for back pain.        Physical Exam  Temp:  [36.2 °C (97.2 °F)-36.5 °C (97.7 °F)] 36.5 °C (97.7 °F)  Pulse:  [76-84] 84  Resp:  [14-16] 16  BP: (126-150)/(63-80) 127/76  SpO2:  [92 %-97 %] 92 %    Physical Exam  Cardiovascular:      Rate and Rhythm: Normal rate.      Pulses: Normal pulses.   Pulmonary:      Effort: Pulmonary effort is normal. No respiratory  distress.      Breath sounds: No wheezing.   Abdominal:      General: Abdomen is flat.   Musculoskeletal:      Right lower leg: No edema.      Left lower leg: No edema.      Comments: Upper back surgical dressing noted   Neurological:      Mental Status: He is alert.      Comments: Motor strength 5 out of 5 all extremities, sensory intact         Fluids    Intake/Output Summary (Last 24 hours) at 12/20/2024 0949  Last data filed at 12/20/2024 0505  Gross per 24 hour   Intake --   Output 300 ml   Net -300 ml        Laboratory  Recent Labs     12/18/24  0347 12/19/24  0033 12/20/24  0040   WBC 15.3* 14.3* 15.1*   RBC 4.90 4.86 4.94   HEMOGLOBIN 10.1* 9.8* 9.8*   HEMATOCRIT 33.8* 33.2* 34.3*   MCV 69.0* 68.3* 69.4*   MCH 20.6* 20.2* 19.8*   MCHC 29.9* 29.5* 28.6*   RDW 45.1 44.5 45.2   PLATELETCT 219 205 224   MPV 9.5 9.5 9.8     Recent Labs     12/18/24 0347 12/19/24  0033   SODIUM 137 136   POTASSIUM 4.6 4.4   CHLORIDE 105 105   CO2 20 22   GLUCOSE 159* 134*   BUN 16 18   CREATININE 0.75 0.89   CALCIUM 7.7* 8.2*                   Imaging  DX-CHEST-LIMITED (1 VIEW)   Final Result      No acute cardiopulmonary disease.      DX-O-ARM   Final Result      Portable O-arm utilized for 3 seconds.         INTERPRETING LOCATION: [address] [place] [address], [id number]      DX-CERVICAL SPINE-2 OR 3 VIEWS   Final Result      Digitized intraoperative radiograph is submitted for review. This examination is not for diagnostic purpose but for guidance during a surgical procedure. Please see the patient's chart for full procedural details.         INTERPRETING LOCATION: [address] [facility], [address]      DX-PORTABLE FLUORO > 1 HOUR   Final Result      Portable fluoroscopy utilized for 26 seconds.         INTERPRETING LOCATION: [address] [place] [address], [id number]           Assessment/Plan  * Cervical spinal stenosis- (present on admission)  Assessment & Plan  Patient underwent laminectomy and discectomy with neurosurgery 12/17/2024.  Pain  management  Drain removed  PT/OT recommending home health      Leucocytosis  Assessment & Plan  Likely stress-induced in setting of steroid use  Procalcitonin negative, remained afebrile  Chest x-ray unremarkable  No concern for infection  Continue monitor      Postoperative hypoxia- (present on admission)  Assessment & Plan  Resolved  Now on room air  Procalcitonin negative  Repeat chest x-ray unremarkable for cardiopulmonary disease    Obstructive sleep apnea syndrome- (present on admission)  Assessment & Plan  -CPAP    CECILIA (generalized anxiety disorder)- (present on admission)  Assessment & Plan  -Continue home sertraline    Gastroesophageal reflux disease without esophagitis- (present on admission)  Assessment & Plan  -Continue home omeprazole    Type 2 diabetes mellitus with microalbuminuria, without long-term current use of insulin (HCC)- (present on admission)  Assessment & Plan  Hemoglobin A1c 6.5%.    -Hypoglycemia protocol  -Diabetic diet  -SSI    12/19/2024  Fingerstick remained stable in 140s    Essential hypertension- (present on admission)  Assessment & Plan  -Continue home valsartan, metoprolol, amlodipine         VTE prophylaxis: SCD    I have performed a physical exam and reviewed and updated ROS and Plan today (12/20/2024). In review of yesterday's note (12/19/2024), there are no changes except as documented above.          Greater than 52 minutes spent preparing to see patient (e.g. review of tests) obtaining and/or reviewing separately obtained history. Performing a medically appropriate examination and/ evaluation.  Counseling and educating the patient/family/caregiver.  Ordering medications, tests, or procedures.  Referring and communicating with other health care professionals.  Documenting clinical information in EPIC.  Independently interpreting results and communicating results to patient/family/caregiver.  Care coordination.

## 2024-12-20 NOTE — CARE PLAN
The patient is Stable - Low risk of patient condition declining or worsening    Shift Goals  Clinical Goals: Q4 neuro checks, mobility, safety  Patient Goals: rest, comfort  Family Goals: updates    Progress made toward(s) clinical / shift goals:    Problem: Safety  Goal: Will remain free from injury  Outcome: Progressing  Goal: Will remain free from falls  Outcome: Progressing     Problem: Pain Management  Goal: Pain level will decrease to patient's comfort goal  Outcome: Progressing  Note: Paitient educated on 0 to 10 pain scale. Pain managed with pharmacologic and non-pharmacological interventions. See MAR. Pt verbilized understanding of interventions.      Problem: Respiratory:  Goal: Respiratory status will improve  Outcome: Progressing     Problem: Knowledge Deficit  Goal: Knowledge of disease process/condition, treatment plan, diagnostic tests, and medications will improve  Outcome: Progressing  Goal: Knowledge of the prescribed therapeutic regimen will improve  Outcome: Progressing     Problem: Pain - Standard  Goal: Alleviation of pain or a reduction in pain to the patient’s comfort goal  Outcome: Progressing     Problem: Fall Risk  Goal: Patient will remain free from falls  Outcome: Progressing  Note: Bed alarm on, bed locked and in lowest position. DME out of site. Pt educated on use of call light for assistance. Pt verbalizes understanding.     Problem: Knowledge Deficit - Standard  Goal: Patient and family/care givers will demonstrate understanding of plan of care, disease process/condition, diagnostic tests and medications  Outcome: Progressing       Patient is not progressing towards the following goals:

## 2024-12-20 NOTE — DISCHARGE PLANNING
"HTH/SCP TCN chart review completed.  Current discharge considerations are home with home health. Patient currently on RA, and PT recommending home health (pending improvement of BP). Noted per PT note on 12/18, patient owns FWW.      TCN will continue to follow and collaborate with discharge planning team as additional post acute needs arise. Thank you.    Completed:  PT recommends home with home health - 12/20  \"Pending improvement of BP  OT orders in chart, and eval attempted - 12/19  Choice obtained: HH ([facility] has accepted), DME ([facility])  SCP with [facility] PCP. Anticipate post op f/u.     "

## 2024-12-20 NOTE — THERAPY
"Speech Language Pathology   Daily Treatment     Patient Name: Raf Spear  AGE:  69 y.o., SEX:  male  Medical Record #: [id number]  Date of Service: 12/20/2024      Precautions:  Precautions: Fall Risk, Swallow Precautions, Cervical Collar         Pt is a 70 y/o male admitted yesterday for planned cervical discectomy and laminectomy d/t spinal stenosis. S/p anterior C5-C6 ACDF and posterior C3-T1 lami surgery 12/17 and now with post-operative hypoxia.      PMHx: BCC, diabetes, HLD, HTN, sleep apnea.       Subjective:  Patient seen this date for dysphagia management. Patient alert, agreeable to session, and sitting UIC. Patient's wife at bedside. They reported improved dysphagia symptoms after steroids.       Assessment:  Vocal quality near \"normal.\" PO presentations of thin liquids (TN0) cup and regular solids (RG7). Adequate oral bolus acceptance/containment. Complete AP transfer without notable oral bolus residue upon oral inspection. Adequate bite with functional mastication of solids. Cough x2 throughout entirety of session, not persistent. Vocal quality remained stable throughout PO intake. 1-2 swallows completed per bolus. Patient adequately self-feeding with appropriate rate and volume of intake. Provided education regarding general aspiration precautions as well as signs of aspiration. All questions addressed. RN updated.       Clinical Impressions:   Patient presents with improved swallow function s/p steroids since last seen yesterday. Intermittent signs of airway invasion and pharyngeal inefficiency, which appear well managed with compensatory strategies. Presentation is consistent with post-operative course of ACDF. Recommend continuation of regular diet. Service will continue to follow to maximize swallowing outcomes during acute care stay.       Recommendations    Diet Consistency: Regular solids, thin liquids  Instrumentation: None indicated at this time  Medication: Whole with puree, Whole with " "liquid, Crush with pudding/puree, as appropriate  Supervision: Distant supervision - check on patient 2-3 times per meal  Positioning: Fully upright and midline during oral intake  Risk Management : Small bites/sips, Alternate bites and sips, Slow rate of intake, Reduce environmental distractions  Oral Care: BID                     SLP Treatment Plan  Treatment Plan: Dysphagia Treatment  SLP Frequency: 3x Per Week  Estimated Duration: Until Therapy Goals Met      Anticipated Discharge Needs  Discharge Recommendations: Anticipate that the patient will have no further speech therapy needs after discharge from the hospital  Therapy Recommendations Upon DC: Not Indicated      Patient / Family Goals  Patient / Family Goal #1: \"I'm doing a lot better than I was a few months ago\"  Goal #1 Outcome: Progressing as expected  Short Term Goals  Short Term Goal # 1: Patient will consume regular diet w/ thin liquids with no overt s/sx of aspiration or decline in respiratory status.  Goal Outcome # 1: Progressing as expected      Celia Alvarado, SLP  "

## 2024-12-20 NOTE — CARE PLAN
The patient is Stable - Low risk of patient condition declining or worsening    Shift Goals  Clinical Goals: pain control, mobility, safety and comfort  Patient Goals: pain control, sleep  Family Goals: n/a    Progress made toward(s) clinical / shift goals:    Problem: Safety  Goal: Will remain free from injury  Outcome: Progressing     Problem: Pain Management  Goal: Pain level will decrease to patient's comfort goal  Outcome: Progressing     Problem: Knowledge Deficit  Goal: Knowledge of disease process/condition, treatment plan, diagnostic tests, and medications will improve  Outcome: Progressing     Problem: Pain - Standard  Goal: Alleviation of pain or a reduction in pain to the patient’s comfort goal  Outcome: Progressing     Problem: Knowledge Deficit - Standard  Goal: Patient and family/care givers will demonstrate understanding of plan of care, disease process/condition, diagnostic tests and medications  Outcome: Progressing     Patient is alert and oriented, on RA.   Fall protocol in effect. Bed in lowest position. Brakes and bed alarm on.   Maintained a clutter free environment. Skid socks on. Call light and personal belongings within reach.  SCD's on.   Patient c/o of pain, treated per MAR. Educated on pain scale.   Patient educated on POC. Encouraged verbalize of feelings.   All questions were answered.      Patient is not progressing towards the following goals:

## 2024-12-22 ENCOUNTER — HOME CARE VISIT (OUTPATIENT)
Dept: HOME HEALTH SERVICES | Facility: HOME HEALTHCARE | Age: 69
End: 2024-12-22
Payer: MEDICARE

## 2024-12-22 VITALS
WEIGHT: 195.4 LBS | HEART RATE: 67 BPM | OXYGEN SATURATION: 95 % | RESPIRATION RATE: 16 BRPM | HEIGHT: 70 IN | TEMPERATURE: 97.8 F | DIASTOLIC BLOOD PRESSURE: 78 MMHG | BODY MASS INDEX: 27.97 KG/M2 | SYSTOLIC BLOOD PRESSURE: 138 MMHG

## 2024-12-22 PROCEDURE — 665998 HH PPS REVENUE CREDIT

## 2024-12-22 PROCEDURE — 665999 HH PPS REVENUE DEBIT

## 2024-12-22 PROCEDURE — 665001 SOC-HOME HEALTH

## 2024-12-22 PROCEDURE — 665005 NO-PAY RAP - HOME HEALTH

## 2024-12-22 PROCEDURE — G0495 RN CARE TRAIN/EDU IN HH: HCPCS

## 2024-12-22 PROCEDURE — A6209 FOAM DRSG <=16 SQ IN W/O BDR: HCPCS

## 2024-12-22 SDOH — ECONOMIC STABILITY: HOUSING INSECURITY: HOME SAFETY: SN EDUCATED PT IN REGARDS TO FALL PREVENTION USING HANDOUT IN WHITE BINDER.

## 2024-12-22 ASSESSMENT — ACTIVITIES OF DAILY LIVING (ADL)
BATHING_CURRENT_FUNCTION: MINIMUM ASSIST
TOILETING: SUPERVISION
PHYSICAL TRANSFERS ASSESSED: 1
BATHING ASSESSED: 1
DRESSING_UB_CURRENT_FUNCTION: MINIMUM ASSIST
USING THE TELPHONE: INDEPENDENT
AMBULATION ASSISTANCE: 1
GROOMING ASSESSED: 1
ORAL_CARE_CURRENT_FUNCTION: NEEDS ASSISTANCE
ORAL_CARE_ASSESSED: 1
FEEDING: SUPERVISION
TELEPHONE USE ASSESSED: 1
CURRENT_FUNCTION: SUPERVISION
GROOMING_CURRENT_FUNCTION: MINIMUM ASSIST
AMBULATION ASSISTANCE: SUPERVISION
TOILETING: 1
TRANSPORTATION COMMENTS: PT NEEDS ASSISTANCE TO LEAVE HOME
DRESSING_LB_CURRENT_FUNCTION: MINIMUM ASSIST
FEEDING ASSESSED: 1

## 2024-12-22 ASSESSMENT — ENCOUNTER SYMPTOMS
HIGHEST PAIN SEVERITY IN PAST 24 HOURS: 4/10
DEPRESSION: 1
STOOL FREQUENCY: LESS THAN DAILY
LAST BOWEL MOVEMENT: 67195
VOMITING: PT DENIES
PAIN LOCATION - PAIN FREQUENCY: FREQUENT
DEPRESSED MOOD: 1
PAIN: 1
FATIGUE: 1
PAIN LOCATION - PAIN QUALITY: ACHY
SUBJECTIVE PAIN PROGRESSION: GRADUALLY IMPROVING
LOWEST PAIN SEVERITY IN PAST 24 HOURS: 2/10
FATIGUES EASILY: 1
PAIN SEVERITY GOAL: 1/10
BOWEL PATTERN NORMAL: 1
PAIN LOCATION - PAIN DURATION: ACUTE
PAIN LOCATION: NECK
NAUSEA: PT DENIES
PAIN LOCATION - PAIN SEVERITY: 2/10
TROUBLE SWALLOWING: 1

## 2024-12-22 ASSESSMENT — FIBROSIS 4 INDEX: FIB4 SCORE: 1.73

## 2024-12-22 NOTE — Clinical Note
Primary dx/Skilled need: Pt s/p C4-5, C6-7 diskectomy and laminectomy on 12/17/24 by Dr. Merritt. Hx of cervical spinal stenosis, Igm Lambda monoclonal gammopathy, leukocytosis, lymphoma, HTN, DM II, CECILIA, ANNE, insomnia, anemia, [place], diverticulosis, ED, aortic dilation.   SN frequency: 3w1, 2w3, 3 PRN for complaints of increased pain, redness or drainage.  Education regarding disease process, medications, home safety, and wound care.   Zip code: [id number]  Disciplines ordered: PT: 1w1 eval and treat; OT: 1w1 eval and treat; ST: 1w1 eval for difficulty swallowing.   Insurance & authorization: [facility]  Certification period: 12/22/24 to 2/19/25  Special consideration:  Pt states when he wears C collar, he feels like he can't swallow his food. C collar was assessed, and is not tight. Cg states pt does have anxiety.

## 2024-12-23 ENCOUNTER — HOME CARE VISIT (OUTPATIENT)
Dept: HOME HEALTH SERVICES | Facility: HOME HEALTHCARE | Age: 69
End: 2024-12-23
Payer: MEDICARE

## 2024-12-23 ENCOUNTER — DOCUMENTATION (OUTPATIENT)
Dept: MEDICAL GROUP | Facility: PHYSICIAN GROUP | Age: 69
End: 2024-12-23
Payer: MEDICARE

## 2024-12-23 VITALS
DIASTOLIC BLOOD PRESSURE: 88 MMHG | HEART RATE: 84 BPM | RESPIRATION RATE: 16 BRPM | SYSTOLIC BLOOD PRESSURE: 142 MMHG | TEMPERATURE: 97.5 F | OXYGEN SATURATION: 95 %

## 2024-12-23 PROCEDURE — 665998 HH PPS REVENUE CREDIT

## 2024-12-23 PROCEDURE — G0153 HHCP-SVS OF S/L PATH,EA 15MN: HCPCS

## 2024-12-23 PROCEDURE — G0180 MD CERTIFICATION HHA PATIENT: HCPCS | Performed by: FAMILY MEDICINE

## 2024-12-23 PROCEDURE — 665999 HH PPS REVENUE DEBIT

## 2024-12-23 ASSESSMENT — ENCOUNTER SYMPTOMS
PAIN SEVERITY GOAL: 0/10
PAIN LOCATION - EXACERBATING FACTORS: MOVEMENT
SUBJECTIVE PAIN PROGRESSION: UNCHANGED
AGITATION: 1
PAIN LOCATION - PAIN QUALITY: SHARP
APPETITE LEVEL: POOR
LOWEST PAIN SEVERITY IN PAST 24 HOURS: 0/10
HIGHEST PAIN SEVERITY IN PAST 24 HOURS: 5/10
TROUBLE SWALLOWING: 1
PAIN LOCATION: NECK
PAIN LOCATION - PAIN FREQUENCY: FREQUENT
CHANGE IN APPETITE: DECREASED
PAIN: 1
PAIN LOCATION - PAIN SEVERITY: 3/10

## 2024-12-23 NOTE — PROGRESS NOTES
Medication chart review for [facility] services    Received referral from [facility].   Medications reviewed  compared with discharge summary if available.  Discharge summary date, if applicable:   12/20/2024    Current medication list per [facility]     Medication list one, patient is currently taking    Current Outpatient Medications:     methylPREDNISolone, Take 1 dose pk by oral route.    methocarbamol, Take 2 tablets every 8 hours by oral route for 30 days.    docusate sodium, Take 1 capsule every day by oral route for 30 days.    oxyCODONE-acetaminophen, Take 1 tablet every 4-6 hours by oral route as needed for 7 days.    acetaminophen, 500-1,000 mg, Oral, Q6HRS PRN    sertraline, 50 mg, Oral, QDAY    metFORMIN, 500 mg, Oral, BID WITH MEALS    Jardiance, 1 Tablet, Oral, DAILY    valsartan, 320 mg, Oral, DAILY    omeprazole, Take 1 capsule by mouth twice a day 30 min before meals    amLODIPine, 5 mg, Oral, DAILY    metoprolol SR, 50 mg, Oral, DAILY    amLODIPine, 2.5 mg, Oral, DAILY      Medication list two, drugs that the patient has been prescribed or recommended to take by their healthcare provider on discharge summary  Medication List          ASK your doctor about these medications         Instructions   acetaminophen 500 MG Tabs  Commonly known as: Tylenol    Take 500-1,000 mg by mouth every 6 hours as needed for Moderate Pain.  Dose: 500-1,000 mg      * amLODIPine 5 MG Tabs  Commonly known as: Norvasc    Take 1 Tablet by mouth every day. For high blood pressure  Dose: 5 mg      * amLODIPine 2.5 MG Tabs  Commonly known as: Norvasc    Take 1 Tablet by mouth every day. Along with amlodipine 5 mg for today of 7.5 mg/day for blood pressure  Dose: 2.5 mg      aspirin 81 MG EC tablet    Take 81 mg by mouth every day.  Dose: 81 mg      docusate sodium 100 MG Caps  Commonly known as: Colace    Doctor's comments: meds to beds  Take 1 capsule every day by oral route for 30 days.      fish oil 1000 MG Caps  capsule    Take 1,000 mg by mouth every day.  Dose: 1,000 mg      glucosamine Sulfate 500 MG Caps    Take 500 mg by mouth every day.   Dose: 500 mg      Jardiance 25 MG Tabs  Generic drug: Empagliflozin    Doctor's comments: Please provide sample to patient  Take 1 Tablet by mouth every day.  Dose: 1 Tablet      metFORMIN 500 MG Tabs  Commonly known as: Glucophage    Take 1 Tablet by mouth 2 times a day with meals.  Dose: 500 mg      methocarbamol 500 MG Tabs  Commonly known as: Robaxin    Doctor's comments: meds to beds  Take 2 tablets every 8 hours by oral route for 30 days.      methylPREDNISolone 4 MG Tbpk  Commonly known as: Medrol    Doctor's comments: meds to beds  Take 1 dose pk by oral route.      metoprolol SR 50 MG Tb24  Commonly known as: Toprol XL    Take 1 Tablet by mouth every day. For heart and blood pressure  Dose: 50 mg      MSM 1000 MG Caps    Take 1,000 mg by mouth every day.  Dose: 1,000 mg      MULTI-VITAMIN DAILY PO    Take 1 Tablet by mouth every day.  Dose: 1 Tablet      omeprazole 20 MG delayed-release capsule  Commonly known as: PriLOSEC    Take 1 capsule by mouth twice a day 30 min before meals      oxyCODONE-acetaminophen 5-325 MG Tabs  Commonly known as: Percocet    Doctor's comments: meds to beds  Take 1 tablet every 4-6 hours by oral route as needed for 7 days.      sertraline 50 MG Tabs  Commonly known as: Zoloft    Take 1 tablet by mouth every day.  Dose: 50 mg      valsartan 320 MG tablet  Commonly known as: Diovan    Take 1 Tablet by mouth every day.  Dose: 320 mg      Vitamin C CR 1000 MG Tbcr    Take 1,000 mg by mouth every day.  Dose: 1,000 mg       No Known Allergies    Labs     Lab Results   Component Value Date/Time    SODIUM 136 12/19/2024 12:33 AM    POTASSIUM 4.4 12/19/2024 12:33 AM    CHLORIDE 105 12/19/2024 12:33 AM    CO2 22 12/19/2024 12:33 AM    GLUCOSE 134 (H) 12/19/2024 12:33 AM    BUN 18 12/19/2024 12:33 AM    CREATININE 0.89 12/19/2024 12:33 AM     Lab Results    Component Value Date/Time    ALKPHOSPHAT 69 11/07/2024 11:00 AM    ASTSGOT 40 11/07/2024 11:00 AM    ALTSGPT 54 (H) 11/07/2024 11:00 AM    TBILIRUBIN 0.3 11/07/2024 11:00 AM    INR 1.08 12/12/2024 10:41 AM    ALBUMIN 4.2 11/07/2024 11:00 AM    ALBUMIN 4.08 11/07/2024 11:00 AM        Assessment for clinically significant drug interactions, drug omissions/additions, duplicative therapies.            CC   Ryan Carranza M.D.  [address]  [facility] [address]  Fax: [phone]    [facility]  Phone [phone] fax [phone]    This note was created using voice recognition software (Dragon). The accuracy of the dictation is limited by the abilities of the software. I have reviewed the note prior to signing, however some errors in grammar and context are still possible. If you have any questions related to this note please do not hesitate to contact our office.

## 2024-12-24 ENCOUNTER — HOME CARE VISIT (OUTPATIENT)
Dept: HOME HEALTH SERVICES | Facility: HOME HEALTHCARE | Age: 69
End: 2024-12-24
Payer: MEDICARE

## 2024-12-24 ENCOUNTER — TELEPHONE (OUTPATIENT)
Dept: HEALTH INFORMATION MANAGEMENT | Facility: OTHER | Age: 69
End: 2024-12-24
Payer: MEDICARE

## 2024-12-24 PROCEDURE — G0495 RN CARE TRAIN/EDU IN HH: HCPCS

## 2024-12-24 PROCEDURE — 665998 HH PPS REVENUE CREDIT

## 2024-12-24 PROCEDURE — 665999 HH PPS REVENUE DEBIT

## 2024-12-25 VITALS
DIASTOLIC BLOOD PRESSURE: 88 MMHG | OXYGEN SATURATION: 97 % | RESPIRATION RATE: 16 BRPM | TEMPERATURE: 97.6 F | HEART RATE: 81 BPM | SYSTOLIC BLOOD PRESSURE: 138 MMHG

## 2024-12-25 DIAGNOSIS — I10 ESSENTIAL HYPERTENSION: ICD-10-CM

## 2024-12-25 PROCEDURE — 665999 HH PPS REVENUE DEBIT

## 2024-12-25 PROCEDURE — 665998 HH PPS REVENUE CREDIT

## 2024-12-25 ASSESSMENT — ENCOUNTER SYMPTOMS
PAIN LOCATION: L SIDE OF NECK
LOWEST PAIN SEVERITY IN PAST 24 HOURS: 2/10
PAIN: 1
FATIGUES EASILY: 1
PAIN LOCATION - PAIN FREQUENCY: INTERMITTENT
SUBJECTIVE PAIN PROGRESSION: UNCHANGED
MUSCLE WEAKNESS: 1
VOMITING: PT DENIES EMESIS AT THIS TIME.
STOOL FREQUENCY: LESS THAN DAILY
SKIN LESIONS: 1
PAIN LOCATION - RELIEVING FACTORS: REST, MEDS
HIGHEST PAIN SEVERITY IN PAST 24 HOURS: 5/10
PAIN LOCATION - EXACERBATING FACTORS: ACITIVTY
NAUSEA: PT DENIES NAUSEA AT THIS TIME.
LAST BOWEL MOVEMENT: 67197
PAIN SEVERITY GOAL: 1/10
PAIN LOCATION - PAIN SEVERITY: 3/10
LIMITED RANGE OF MOTION: 1
PAIN LOCATION - PAIN QUALITY: ACHING
BOWEL PATTERN NORMAL: 1

## 2024-12-25 ASSESSMENT — ACTIVITIES OF DAILY LIVING (ADL)
CURRENT_FUNCTION: STAND BY ASSIST
AMBULATION ASSISTANCE: STAND BY ASSIST

## 2024-12-26 ENCOUNTER — PHARMACY VISIT (OUTPATIENT)
Dept: PHARMACY | Facility: MEDICAL CENTER | Age: 69
End: 2024-12-26
Payer: COMMERCIAL

## 2024-12-26 ENCOUNTER — TELEPHONE (OUTPATIENT)
Dept: HEALTH INFORMATION MANAGEMENT | Facility: OTHER | Age: 69
End: 2024-12-26
Payer: MEDICARE

## 2024-12-26 ENCOUNTER — HOME CARE VISIT (OUTPATIENT)
Dept: HOME HEALTH SERVICES | Facility: HOME HEALTHCARE | Age: 69
End: 2024-12-26
Payer: MEDICARE

## 2024-12-26 VITALS
DIASTOLIC BLOOD PRESSURE: 76 MMHG | SYSTOLIC BLOOD PRESSURE: 130 MMHG | TEMPERATURE: 97.6 F | RESPIRATION RATE: 16 BRPM | OXYGEN SATURATION: 96 % | HEART RATE: 84 BPM

## 2024-12-26 PROCEDURE — G0151 HHCP-SERV OF PT,EA 15 MIN: HCPCS

## 2024-12-26 PROCEDURE — RXMED WILLOW AMBULATORY MEDICATION CHARGE: Performed by: FAMILY MEDICINE

## 2024-12-26 PROCEDURE — 665999 HH PPS REVENUE DEBIT

## 2024-12-26 PROCEDURE — 665998 HH PPS REVENUE CREDIT

## 2024-12-26 RX ORDER — METOPROLOL SUCCINATE 50 MG/1
50 TABLET, EXTENDED RELEASE ORAL DAILY
Qty: 100 TABLET | Refills: 3 | Status: SHIPPED | OUTPATIENT
Start: 2024-12-26

## 2024-12-26 ASSESSMENT — ACTIVITIES OF DAILY LIVING (ADL)
AMBULATION ASSISTANCE: 1
AMBULATION ASSISTANCE: ONE PERSON
PHYSICAL_TRANSFERS_DEVICES: FWW
PHYSICAL TRANSFERS ASSESSED: 1
CURRENT_FUNCTION: SUPERVISION
AMBULATION ASSISTANCE ON FLAT SURFACES: 1
AMBULATION ASSISTANCE: SUPERVISION
CURRENT_FUNCTION: ONE PERSON

## 2024-12-26 ASSESSMENT — ENCOUNTER SYMPTOMS
PAIN LOCATION - RELIEVING FACTORS: TYLENOL, REST
PAIN LOCATION - PAIN SEVERITY: 1/10
PAIN LOCATION: L NECK
MUSCLE WEAKNESS: 1
PAIN LOCATION - PAIN FREQUENCY: CONSTANT
PAIN SEVERITY GOAL: 0/10
LOWEST PAIN SEVERITY IN PAST 24 HOURS: 1/10
PAIN: 1
PAIN LOCATION - PAIN QUALITY: ACHE
ARTHRALGIAS: 1
HIGHEST PAIN SEVERITY IN PAST 24 HOURS: 3/10
SUBJECTIVE PAIN PROGRESSION: GRADUALLY IMPROVING
PAIN LOCATION - PAIN DURATION: SINCE SURGERY

## 2024-12-26 NOTE — Clinical Note
PT pretty completed, requesting auth for 1w1, 2w2, 1w2, effective 12/26/24.  PT interventions: bed mobility training, transfer training, gait training, balance training, building activity tolerance, strengthening, HEP and manual therapy as needed for muscle and tissue tension

## 2024-12-26 NOTE — CASE COMMUNICATION
Noted. Thank you.  ----- Message -----  From: Kayla Garcia, SLP  Sent: 12/23/2024   3:32 PM PST  To: Ankita Stauffer OT; *      SLP evaluation completed. Requesting 1W3 with 2 PRN for a change in status effective 12/23/24.

## 2024-12-26 NOTE — CASE COMMUNICATION
Noted. Thank you.  ----- Message -----  From: Yesica Gatica R.N.  Sent: 12/22/2024   4:09 PM PST  To: Alethea Spears, PT; David Schumacher R.N.; *      Primary dx/Skilled need: Pt s/p C4-5, C6-7 diskectomy and laminectomy on 12/17/24 by Dr. Merritt. Hx of cervical spinal stenosis, Igm Lambda monoclonal gammopathy, leukocytosis, lymphoma, HTN, DM II, CECILIA, ANNE, insomnia, anemia, [place], diverticulosis, ED, aortic dilation.   SN frequ ency: 3w1, 2w3, 3 PRN for complaints of increased pain, redness or drainage.  Education regarding disease process, medications, home safety, and wound care.   Zip code: [id number]  Disciplines ordered: PT: 1w1 eval and treat; OT: 1w1 eval and treat; ST: 1w1 eval for difficulty swallowing.   Insurance & authorization: [facility]  Certification period: 12/22/24 to 2/19/25  Special consideration:  Pt states when he wears C collar, he feels like he can 't swallow his food. C collar was assessed, and is not tight. Cg states pt does have anxiety.

## 2024-12-27 ENCOUNTER — HOME CARE VISIT (OUTPATIENT)
Dept: HOME HEALTH SERVICES | Facility: HOME HEALTHCARE | Age: 69
End: 2024-12-27
Payer: MEDICARE

## 2024-12-27 ENCOUNTER — TELEPHONE (OUTPATIENT)
Dept: HEALTH INFORMATION MANAGEMENT | Facility: OTHER | Age: 69
End: 2024-12-27
Payer: MEDICARE

## 2024-12-27 VITALS
SYSTOLIC BLOOD PRESSURE: 122 MMHG | RESPIRATION RATE: 16 BRPM | OXYGEN SATURATION: 95 % | DIASTOLIC BLOOD PRESSURE: 64 MMHG | HEART RATE: 78 BPM | TEMPERATURE: 98 F

## 2024-12-27 PROCEDURE — 665999 HH PPS REVENUE DEBIT

## 2024-12-27 PROCEDURE — 665998 HH PPS REVENUE CREDIT

## 2024-12-27 PROCEDURE — G0495 RN CARE TRAIN/EDU IN HH: HCPCS

## 2024-12-27 ASSESSMENT — ACTIVITIES OF DAILY LIVING (ADL)
OASIS_M1830: 03
TRANSPORTATION COMMENTS: POST OP
AMBULATION ASSISTANCE: STAND BY ASSIST
CURRENT_FUNCTION: STAND BY ASSIST

## 2024-12-27 ASSESSMENT — ENCOUNTER SYMPTOMS
STOOL FREQUENCY: DAILY
NAUSEA: PT DENIES NAUSEA AT THIS TIME.
FATIGUES EASILY: 1
LAST BOWEL MOVEMENT: 67201
HIGHEST PAIN SEVERITY IN PAST 24 HOURS: 4/10
BOWEL PATTERN NORMAL: 1
PAIN SEVERITY GOAL: 0/10
SUBJECTIVE PAIN PROGRESSION: UNCHANGED
PAIN: 1
PAIN LOCATION - PAIN QUALITY: ACHING
PAIN LOCATION - RELIEVING FACTORS: REST, MEDS
SKIN LESIONS: 1
PAIN LOCATION - PAIN FREQUENCY: INTERMITTENT
PAIN LOCATION: LEFT NECK
PAIN LOCATION - EXACERBATING FACTORS: ACTIVITY
LOWEST PAIN SEVERITY IN PAST 24 HOURS: 1/10
VOMITING: PT DENIES EMESIS AT THIS TIME.
PAIN LOCATION - PAIN SEVERITY: 2/10

## 2024-12-27 NOTE — Clinical Note
I agree with these changes  ----- Message -----  From: Carissa Rosa R.N.  Sent: 12/27/2024  11:30 AM PST  To: Yesica Gatica R.N.      Quality Review Completed for 12/22 [facility] by SHAWANDA Rosa RN on 12/272024:     Edits completed by SHAWANDA Rosa RN:     1.  and  diagnosis coding updated per chart review  2. Non invasive ventilator CPAP per EMR patient has ANNE and is using CPAP  3.  changed to 3,  and  to 2 per narrative bathing is min assist and transferring is supervision  4.  changed to 3 per guidelines when ambulation is supervised  5. Added hearing, ambulation, ambulation w/assist only to  forms

## 2024-12-27 NOTE — CASE COMMUNICATION
Quality Review Completed for 12/22 [facility] OASIS by SHAWANDA Rosa RN on 12/272024:     Edits completed by SHAWANDA Rosa RN:     1.  and  diagnosis coding updated per chart review  2. Non invasive ventilator CPAP per EMR patient has ANNE and is using CPAP  3.  changed to 3,  and  to 2 per narrative bathing is min assist and transferring is supervision  4.  changed to 3 per guidelines when ambulation is supervised  5. A dded hearing, ambulation, ambulation w/assist only to  forms

## 2024-12-27 NOTE — CASE COMMUNICATION
Noted. Thank you.  ----- Message -----  From: Ana Lilia Scott, PT  Sent: 12/26/2024   7:04 PM PST  To: Ankita Stauffer OT; David Schumacher R.N.; *      PT pretty completed, requesting auth for 1w1, 2w2, 1w2, effective 12/26/24.  PT interventions: bed mobility training, transfer training, gait training, balance training, building activity tolerance, strengthening, HEP and manual therapy as needed for muscle and tissue tension

## 2024-12-28 ENCOUNTER — HOME CARE VISIT (OUTPATIENT)
Dept: HOME HEALTH SERVICES | Facility: HOME HEALTHCARE | Age: 69
End: 2024-12-28
Payer: MEDICARE

## 2024-12-28 PROCEDURE — G0152 HHCP-SERV OF OT,EA 15 MIN: HCPCS

## 2024-12-28 PROCEDURE — 665999 HH PPS REVENUE DEBIT

## 2024-12-28 PROCEDURE — 665998 HH PPS REVENUE CREDIT

## 2024-12-28 NOTE — Clinical Note
OT pretty completed, requesting auth for 1w3, 2 PRN visits for shower safety assessment, effective 12/28/24.

## 2024-12-29 PROCEDURE — 665998 HH PPS REVENUE CREDIT

## 2024-12-29 PROCEDURE — 665999 HH PPS REVENUE DEBIT

## 2024-12-30 ENCOUNTER — OFFICE VISIT (OUTPATIENT)
Dept: MEDICAL GROUP | Facility: MEDICAL CENTER | Age: 69
End: 2024-12-30
Payer: MEDICARE

## 2024-12-30 ENCOUNTER — HOME CARE VISIT (OUTPATIENT)
Dept: HOME HEALTH SERVICES | Facility: HOME HEALTHCARE | Age: 69
End: 2024-12-30
Payer: MEDICARE

## 2024-12-30 ENCOUNTER — PATIENT MESSAGE (OUTPATIENT)
Dept: ONCOLOGY | Facility: MEDICAL CENTER | Age: 69
End: 2024-12-30

## 2024-12-30 ENCOUNTER — PATIENT OUTREACH (OUTPATIENT)
Dept: ONCOLOGY | Facility: MEDICAL CENTER | Age: 69
End: 2024-12-30

## 2024-12-30 VITALS
TEMPERATURE: 98.1 F | RESPIRATION RATE: 17 BRPM | DIASTOLIC BLOOD PRESSURE: 62 MMHG | HEART RATE: 88 BPM | OXYGEN SATURATION: 96 % | SYSTOLIC BLOOD PRESSURE: 98 MMHG

## 2024-12-30 VITALS
TEMPERATURE: 97.3 F | OXYGEN SATURATION: 98 % | HEART RATE: 97 BPM | HEIGHT: 70 IN | DIASTOLIC BLOOD PRESSURE: 78 MMHG | SYSTOLIC BLOOD PRESSURE: 134 MMHG | WEIGHT: 188 LBS | BODY MASS INDEX: 26.92 KG/M2

## 2024-12-30 DIAGNOSIS — Z09 HOSPITAL DISCHARGE FOLLOW-UP: Primary | ICD-10-CM

## 2024-12-30 DIAGNOSIS — M48.02 CERVICAL SPINAL STENOSIS: ICD-10-CM

## 2024-12-30 PROCEDURE — 665998 HH PPS REVENUE CREDIT

## 2024-12-30 PROCEDURE — 665999 HH PPS REVENUE DEBIT

## 2024-12-30 PROCEDURE — G0152 HHCP-SERV OF OT,EA 15 MIN: HCPCS

## 2024-12-30 ASSESSMENT — ENCOUNTER SYMPTOMS: DEBILITATING PAIN: 1

## 2024-12-30 ASSESSMENT — FIBROSIS 4 INDEX: FIB4 SCORE: 1.73

## 2024-12-30 NOTE — PROGRESS NOTES
Verbal consent was acquired by the patient to use [employer] ambient listening note generation during this visit:  Yes      Chief complaint::The primary encounter diagnosis was Hospital discharge follow-up. A diagnosis of Cervical spinal stenosis was also pertinent to this visit.    Assessment and Plan:   The following treatment plan was discussed:     Assessment & Plan  1. Cervical stenosis - Chronic improving condition  - Underwent C4-C5, C6-C7 anterior cervical discectomy and posterior laminectomy with fusion at C4-C5, C5-C6, and C6-C7 on 12/17/2024  - Tolerated procedure well with no perioperative complications  - Adequately voiding, tolerating diet, and pain controlled with Tylenol  - Cleared by PT and OT for discharge home with frequent home health visits  - Reports no swelling or issues post-surgery  - Surgical site evaluated, showing no swelling, induration, erythema, or drainage  - Staples intact without erythema or induration  - Advised to follow up with the surgeon for staple removal  -Reviewed labs, medications, hospital note.  -No longer taking oxycodone, has it just in case but okay to continue with Tylenol ibuprofen.    Follow-up  - Follow up with the surgeon on January 7  - Access to doctor via [employer] for any questions or concerns  -Follow-up tomorrow for staple removal at surgeons office    PROCEDURE  Underwent C4-C5, C6-C7 anterior cervical discectomy and posterior laminectomy with fusion at C4-C5, C5-C6, and C6-C7 on 12/17/2024.  Preston was seen today for transitional care management hospital follow-up.    Diagnoses and all orders for this visit:    Hospital discharge follow-up    Cervical spinal stenosis        Followup: Return if symptoms worsen or fail to improve.    Subjective/HPI:   HPI:    Raf Spear is a pleasant 69 y.o. male here for   Chief Complaint   Patient presents with    Transitional Care Management Hospital Follow-up        History of Present Illness  The patient is a  69-year-old individual who presents today after their hospital admission on 12/17/2024 and discharge on 12/20/2024. On 12/17/2024, they underwent a C4-C5, C6-C7 anterior cervical discectomy and posterior laminectomy with fusion at C4-C5, C5-C6, and C6-C7 with Dr. Pandey. They tolerated the procedure with no perioperative complications. They are adequately voiding, tolerating their diet, and their pain is controlled. They were cleared by PT and OT for discharge home. They are scheduled for follow-up with Dr. Chauhan on 01/13/2025 and have frequent home health visits.    History is reported by another person in the presence of the patient. They report no postoperative complications or swelling. They were prescribed steroids briefly post-surgery. The surgical staples are scheduled to be removed tomorrow, with a follow-up appointment on 01/07/2025. They experience neck fatigue after 3-4 hours of wearing the brace, managed by naps. They discontinued Percocet early, finding relief with Tylenol. They receive assistance for daily activities. Initially, they had difficulty swallowing large pills, leading to a 15-pound weight loss, but this has resolved. They report no numbness or tingling and have regained some hand sensation post-surgery, which they find novel. They focus on oral hygiene and aim to shower every other day.    MEDICATIONS  Current: Tylenol  Discontinued: Percocet    Current Medicines (including changes today)  Current Outpatient Medications   Medication Sig Dispense Refill    metoprolol SR (TOPROL XL) 50 MG TABLET SR 24 HR Take 1 Tablet by mouth every day. For heart and blood pressure  Indications: High Blood Pressure 100 Tablet 3    methylPREDNISolone (MEDROL) 4 MG Tablet Therapy Pack Take 1 dose pk by oral route. 21 Tablet 0    methocarbamol (ROBAXIN) 500 MG Tab Take 2 tablets every 8 hours by oral route for 30 days. 180 Tablet 0    docusate sodium (COLACE) 100 MG Cap Take 1 capsule every day by oral route  "for 30 days. 30 Capsule 0    oxyCODONE-acetaminophen (PERCOCET) 5-325 MG Tab Take 1 tablet every 4-6 hours by oral route as needed for 7 days. 42 Tablet 0    acetaminophen (TYLENOL) 500 MG Tab Take 500-1,000 mg by mouth every 6 hours as needed for Mild Pain or Moderate Pain. Indications: Pain      sertraline (ZOLOFT) 50 MG Tab Take 1 tablet by mouth every day. 100 Tablet 3    metFORMIN (GLUCOPHAGE) 500 MG Tab Take 1 Tablet by mouth 2 times a day with meals. 200 Tablet 3    Empagliflozin (JARDIANCE) 25 MG Tab Take 1 Tablet by mouth every day. 100 Tablet 3    valsartan (DIOVAN) 320 MG tablet Take 1 Tablet by mouth every day. 100 Tablet 3    omeprazole (PRILOSEC) 20 MG delayed-release capsule Take 1 capsule by mouth twice a day 30 min before meals 180 Capsule 4    amLODIPine (NORVASC) 5 MG Tab Take 1 Tablet by mouth every day. For high blood pressure 100 Tablet 3    amLODIPine (NORVASC) 2.5 MG Tab Take 1 Tablet by mouth every day. Along with amlodipine 5 mg for today of 7.5 mg/day for blood pressure 100 Tablet 3     No current facility-administered medications for this visit.     Past Medical/ Surgical History  He  has a past medical history of Anesthesia, Basal cell carcinoma, Cough, Daytime sleepiness, Diabetes (HCC), [place] measles, Hearing difficulty, Hyperlipidemia, Hypertension, Influenza, Morning headache, Mumps, Sleep apnea (12/2020), Snoring, Squamous cell carcinoma in situ (SCCIS) of skin, and Wears glasses.  He  has a past surgical history that includes lumbar decompression; laminotomy; posterior cervical fusion o-arm (12/17/2024); cervical disk and fusion anterior (12/17/2024); corpectomy (12/17/2024); and cervical laminectomy posterior (12/17/2024).       Objective:   /78   Pulse 97   Temp 36.3 °C (97.3 °F)   Ht 1.778 m (5' 10\")   Wt 85.3 kg (188 lb)   SpO2 98%  Body mass index is 26.98 kg/m².      Physical Exam  Constitutional:       General: He is not in acute distress.     Appearance: He is " not ill-appearing or toxic-appearing.   HENT:      Head: Normocephalic.      Right Ear: Tympanic membrane and external ear normal.      Left Ear: Tympanic membrane and external ear normal.      Nose: Nose normal. No rhinorrhea.      Mouth/Throat:      Mouth: Mucous membranes are moist.      Pharynx: Oropharynx is clear. No posterior oropharyngeal erythema.   Eyes:      General:         Right eye: No discharge.         Left eye: No discharge.      Conjunctiva/sclera: Conjunctivae normal.      Pupils: Pupils are equal, round, and reactive to light.   Cardiovascular:      Rate and Rhythm: Normal rate and regular rhythm.      Heart sounds: No murmur heard.  Pulmonary:      Effort: Pulmonary effort is normal. No respiratory distress.      Breath sounds: Normal breath sounds. No wheezing.   Abdominal:      General: Abdomen is flat.   Musculoskeletal:         General: No swelling or tenderness.      Cervical back: Normal range of motion and neck supple.      Right lower leg: No edema.      Left lower leg: No edema.   Skin:     General: Skin is warm.          Neurological:      General: No focal deficit present.      Mental Status: He is alert and oriented to person, place, and time. Mental status is at baseline.   Psychiatric:         Mood and Affect: Mood normal.          Lab/ Imaging Results:  Results  Reviewed labs from 1220/2024.  Mild elevation to the sugars, some microcytosis do suspect postsurgical changes.    Please note that this dictation was created using voice recognition software. I have made every reasonable attempt to correct obvious errors, but I expect that there are errors of grammar and possibly content that I did not discover before finalizing the note.

## 2024-12-30 NOTE — PROGRESS NOTES
"ONCOLOGY NURSE NAVIGATION (ONN) ASSESSMENT:  MARYURI Burton reached out to the patient to follow up on his home health services.  Patient's wife andswered and patient was on the line as well.  He denied any concerns or distress.  No barriers to care noted.  They were on their way to a patient appointment.  NN asked to send a second letter of introduction and [facility] cancer support services calendar and patient agreed to sent it to [facility].  NN encouraged the patient and his wife to call for any additional support.        DX: Lymphoma    POC: Patient is established with Neurosurgery and Dr. MILES Chauhan radiation oncology.  2nd NPC with Dr. Chauhan on 1.13.25    FAMHX: Cancer-related family history includes Cancer in his father, mother, and sister.           BARRIERS ASSESSMENT:  TRANSPORTATION: No barriers  EMPLOYMENT:  retired   FINANCIAL:  No barriers shared  INSURANCE:  [place] [facility] +, medicare  SUPPORT SYSTEM:  Spouse, family  PSYCHOLOGICAL: DST 0/10 \"I'm doing fine\"   COMMUNICATION: No barriers noted    FAMILY CARE:  No barriers shared  SELF CARE:  Independent, lives with wife.       INTERVENTION:  Letter of introduction and calendar of [facility] sent to [facility].       "

## 2024-12-31 ENCOUNTER — HOME CARE VISIT (OUTPATIENT)
Dept: HOME HEALTH SERVICES | Facility: HOME HEALTHCARE | Age: 69
End: 2024-12-31
Payer: MEDICARE

## 2024-12-31 ASSESSMENT — ENCOUNTER SYMPTOMS: PAIN: 1

## 2025-01-01 ENCOUNTER — HOME CARE VISIT (OUTPATIENT)
Dept: HOME HEALTH SERVICES | Facility: HOME HEALTHCARE | Age: 70
End: 2025-01-01
Payer: MEDICARE

## 2025-01-01 VITALS
DIASTOLIC BLOOD PRESSURE: 66 MMHG | OXYGEN SATURATION: 96 % | HEART RATE: 91 BPM | TEMPERATURE: 98 F | RESPIRATION RATE: 16 BRPM | SYSTOLIC BLOOD PRESSURE: 124 MMHG

## 2025-01-01 VITALS
OXYGEN SATURATION: 95 % | HEART RATE: 72 BPM | RESPIRATION RATE: 16 BRPM | DIASTOLIC BLOOD PRESSURE: 80 MMHG | TEMPERATURE: 97.4 F | SYSTOLIC BLOOD PRESSURE: 134 MMHG

## 2025-01-01 PROCEDURE — RXMED WILLOW AMBULATORY MEDICATION CHARGE: Performed by: FAMILY MEDICINE

## 2025-01-01 PROCEDURE — G0151 HHCP-SERV OF PT,EA 15 MIN: HCPCS

## 2025-01-01 PROCEDURE — RXMED WILLOW AMBULATORY MEDICATION CHARGE

## 2025-01-01 ASSESSMENT — ENCOUNTER SYMPTOMS
PAIN SEVERITY GOAL: 0/10
PAIN LOCATION - PAIN QUALITY: GNAWING
LOWEST PAIN SEVERITY IN PAST 24 HOURS: 0/10
PAIN LOCATION - PAIN FREQUENCY: CONSTANT
PAIN: 1
PAIN LOCATION - PAIN DURATION: SINCE SURGERY
PAIN LOCATION - EXACERBATING FACTORS: POSITIONING
PAIN LOCATION: L NECK
PAIN LOCATION - PAIN QUALITY: ACHE
PAIN LOCATION - PAIN SEVERITY: 1/10
PAIN SEVERITY GOAL: 0/10
SUBJECTIVE PAIN PROGRESSION: GRADUALLY IMPROVING
PAIN LOCATION - PAIN SEVERITY: 1/10
HIGHEST PAIN SEVERITY IN PAST 24 HOURS: 1/10
PAIN LOCATION - RELIEVING FACTORS: REST, TYLENOL
PAIN: 1
DEBILITATING PAIN: 1
SUBJECTIVE PAIN PROGRESSION: UNCHANGED
LOWEST PAIN SEVERITY IN PAST 24 HOURS: 1/10
HIGHEST PAIN SEVERITY IN PAST 24 HOURS: 3/10

## 2025-01-01 ASSESSMENT — ACTIVITIES OF DAILY LIVING (ADL): AMBULATION_DISTANCE/DURATION_TOLERATED: 60 FT X2

## 2025-01-02 ENCOUNTER — PHARMACY VISIT (OUTPATIENT)
Dept: PHARMACY | Facility: MEDICAL CENTER | Age: 70
End: 2025-01-02
Payer: COMMERCIAL

## 2025-01-02 PROCEDURE — RXMED WILLOW AMBULATORY MEDICATION CHARGE: Performed by: FAMILY MEDICINE

## 2025-01-03 ENCOUNTER — HOME CARE VISIT (OUTPATIENT)
Dept: HOME HEALTH SERVICES | Facility: HOME HEALTHCARE | Age: 70
End: 2025-01-03
Payer: MEDICARE

## 2025-01-03 VITALS
OXYGEN SATURATION: 95 % | SYSTOLIC BLOOD PRESSURE: 126 MMHG | RESPIRATION RATE: 17 BRPM | TEMPERATURE: 98.5 F | DIASTOLIC BLOOD PRESSURE: 74 MMHG | HEART RATE: 77 BPM

## 2025-01-03 VITALS
SYSTOLIC BLOOD PRESSURE: 126 MMHG | TEMPERATURE: 98.5 F | DIASTOLIC BLOOD PRESSURE: 74 MMHG | OXYGEN SATURATION: 95 % | RESPIRATION RATE: 17 BRPM | HEART RATE: 77 BPM

## 2025-01-03 PROCEDURE — G0152 HHCP-SERV OF OT,EA 15 MIN: HCPCS

## 2025-01-03 PROCEDURE — G0151 HHCP-SERV OF PT,EA 15 MIN: HCPCS

## 2025-01-03 ASSESSMENT — ENCOUNTER SYMPTOMS
PAIN LOCATION - PAIN SEVERITY: 1/10
HIGHEST PAIN SEVERITY IN PAST 24 HOURS: 1/10
PAIN LOCATION: NECK
HIGHEST PAIN SEVERITY IN PAST 24 HOURS: 1/10
PAIN: 1
LOWEST PAIN SEVERITY IN PAST 24 HOURS: 0/10
PAIN LOCATION - PAIN SEVERITY: 1/10
PAIN: 1
PAIN LOCATION - PAIN SEVERITY: 1/10
SUBJECTIVE PAIN PROGRESSION: GRADUALLY IMPROVING
DENIES PAIN: 1
LOWEST PAIN SEVERITY IN PAST 24 HOURS: 0/10
SUBJECTIVE PAIN PROGRESSION: UNCHANGED
PAIN SEVERITY GOAL: 0/10
PAIN SEVERITY GOAL: 0/10
PAIN LOCATION - PAIN QUALITY: DISCOMFORT

## 2025-01-03 ASSESSMENT — ACTIVITIES OF DAILY LIVING (ADL)
LAUNDRY: NEEDS ASSISTANCE
SHOPPING: NEEDS ASSISTANCE
ORAL_CARE_CURRENT_FUNCTION: INDEPENDENT
TOILETING: STAND BY ASSIST
DRESSING_LB_CURRENT_FUNCTION: MODERATE ASSIST
BATHING_CURRENT_FUNCTION: MODERATE ASSIST
USING THE TELPHONE: NEEDS ASSISTANCE
TOILETING: 1
CURRENT_FUNCTION: SUPERVISION
LAUNDRY ASSESSED: 1
DRESSING_UB_CURRENT_FUNCTION: MODERATE ASSIST
SHOPPING ASSESSED: 1
FEEDING ASSESSED: 1
DRESSING_UB_CURRENT_FUNCTION: MINIMUM ASSIST
TELEPHONE USE ASSESSED: 1
PHYSICAL TRANSFERS ASSESSED: 1
GROOMING ASSESSED: 1
TRANSPORTATION ASSESSED: 1
HOUSEKEEPING ASSESSED: 1
FEEDING: SUPERVISION
TRANSPORTATION: NEEDS ASSISTANCE
BATHING ASSESSED: 1
GROOMING_CURRENT_FUNCTION: MINIMUM ASSIST
PREPARING MEALS: NEEDS ASSISTANCE
CURRENT_FUNCTION: STAND BY ASSIST
LIGHT HOUSEKEEPING: NEEDS ASSISTANCE
AMBULATION ASSISTANCE: 1
AMBULATION ASSISTANCE: SUPERVISION
ORAL_CARE_ASSESSED: 1

## 2025-01-06 ENCOUNTER — HOME CARE VISIT (OUTPATIENT)
Dept: HOME HEALTH SERVICES | Facility: HOME HEALTHCARE | Age: 70
End: 2025-01-06
Payer: MEDICARE

## 2025-01-06 PROCEDURE — G0152 HHCP-SERV OF OT,EA 15 MIN: HCPCS

## 2025-01-06 NOTE — CASE COMMUNICATION
Noted. Thank you.  ----- Message -----  From: Ankita Stauffer OT  Sent: 1/3/2025  10:31 AM PST  To: David Schumacher R.N.; Emerita olsen completed, requesting auth for 1w3, 2 PRN visits for shower safety assessment, effective 12/28/24.

## 2025-01-07 ENCOUNTER — HOME CARE VISIT (OUTPATIENT)
Dept: HOME HEALTH SERVICES | Facility: HOME HEALTHCARE | Age: 70
End: 2025-01-07
Payer: MEDICARE

## 2025-01-07 VITALS
DIASTOLIC BLOOD PRESSURE: 76 MMHG | SYSTOLIC BLOOD PRESSURE: 132 MMHG | TEMPERATURE: 97.2 F | HEART RATE: 71 BPM | RESPIRATION RATE: 16 BRPM

## 2025-01-07 VITALS
HEART RATE: 78 BPM | RESPIRATION RATE: 16 BRPM | SYSTOLIC BLOOD PRESSURE: 104 MMHG | DIASTOLIC BLOOD PRESSURE: 68 MMHG | OXYGEN SATURATION: 94 % | TEMPERATURE: 97.9 F

## 2025-01-07 VITALS
DIASTOLIC BLOOD PRESSURE: 76 MMHG | RESPIRATION RATE: 16 BRPM | SYSTOLIC BLOOD PRESSURE: 132 MMHG | HEART RATE: 71 BPM | TEMPERATURE: 97.2 F | OXYGEN SATURATION: 96 %

## 2025-01-07 PROCEDURE — G0495 RN CARE TRAIN/EDU IN HH: HCPCS

## 2025-01-07 PROCEDURE — G0151 HHCP-SERV OF PT,EA 15 MIN: HCPCS

## 2025-01-07 ASSESSMENT — ENCOUNTER SYMPTOMS
NAUSEA: PT DENIES NAUSEA AT THIS TIME.
SKIN LESIONS: 1
STOOL FREQUENCY: DAILY
DENIES PAIN: 1
BOWEL PATTERN NORMAL: 1
DENIES PAIN: 1
DEBILITATING PAIN: 1
VOMITING: PT DENIES EMESIS AT THIS TIME.
FATIGUES EASILY: 1
DENIES PAIN: 1
LAST BOWEL MOVEMENT: 67212

## 2025-01-07 ASSESSMENT — ACTIVITIES OF DAILY LIVING (ADL)
AMBULATION_DISTANCE/DURATION_TOLERATED: 100 FT
AMBULATION ASSISTANCE ON UNEVEN SURFACES: 1

## 2025-01-07 ASSESSMENT — PATIENT HEALTH QUESTIONNAIRE - PHQ9: CLINICAL INTERPRETATION OF PHQ2 SCORE: 0

## 2025-01-08 ENCOUNTER — HOME CARE VISIT (OUTPATIENT)
Dept: HOME HEALTH SERVICES | Facility: HOME HEALTHCARE | Age: 70
End: 2025-01-08
Payer: MEDICARE

## 2025-01-08 NOTE — CASE COMMUNICATION
Patient was discussed in IDT today due to recent admission. Discussed progress toward goals of care with the treatment team. The treatment team currently involves the following disciplines: nursing, OT and PT. Plan is to discharge from home health services when all goals are met.     Thank you,  MARKELL Schumacher RN.

## 2025-01-10 ENCOUNTER — HOME CARE VISIT (OUTPATIENT)
Dept: HOME HEALTH SERVICES | Facility: HOME HEALTHCARE | Age: 70
End: 2025-01-10
Payer: MEDICARE

## 2025-01-10 VITALS
DIASTOLIC BLOOD PRESSURE: 76 MMHG | RESPIRATION RATE: 16 BRPM | SYSTOLIC BLOOD PRESSURE: 128 MMHG | OXYGEN SATURATION: 96 % | TEMPERATURE: 97.3 F | HEART RATE: 67 BPM

## 2025-01-10 PROCEDURE — G0151 HHCP-SERV OF PT,EA 15 MIN: HCPCS

## 2025-01-10 PROCEDURE — G0495 RN CARE TRAIN/EDU IN HH: HCPCS

## 2025-01-10 ASSESSMENT — LIFESTYLE VARIABLES
TOBACCO_USE: YES
SMOKING_STATUS: YES
SMOKING_YEARS: 51

## 2025-01-10 ASSESSMENT — ENCOUNTER SYMPTOMS: DENIES PAIN: 1

## 2025-01-12 VITALS
OXYGEN SATURATION: 96 % | TEMPERATURE: 97.3 F | HEART RATE: 67 BPM | SYSTOLIC BLOOD PRESSURE: 128 MMHG | DIASTOLIC BLOOD PRESSURE: 76 MMHG | RESPIRATION RATE: 16 BRPM

## 2025-01-12 ASSESSMENT — ENCOUNTER SYMPTOMS
FATIGUES EASILY: 1
BOWEL PATTERN NORMAL: 1
LAST BOWEL MOVEMENT: 67214
STOOL FREQUENCY: DAILY
DENIES PAIN: 1

## 2025-01-13 ENCOUNTER — HOSPITAL ENCOUNTER (OUTPATIENT)
Dept: RADIATION ONCOLOGY | Facility: MEDICAL CENTER | Age: 70
End: 2025-01-13
Attending: RADIOLOGY
Payer: MEDICARE

## 2025-01-13 VITALS
SYSTOLIC BLOOD PRESSURE: 137 MMHG | WEIGHT: 192.46 LBS | BODY MASS INDEX: 27.62 KG/M2 | HEART RATE: 73 BPM | OXYGEN SATURATION: 98 % | DIASTOLIC BLOOD PRESSURE: 76 MMHG | TEMPERATURE: 97 F

## 2025-01-13 DIAGNOSIS — C82.61 CUTANEOUS FOLLICLE CENTER LYMPHOMA INVOLVING LYMPH NODES OF HEAD (HCC): ICD-10-CM

## 2025-01-13 PROCEDURE — 99215 OFFICE O/P EST HI 40 MIN: CPT | Performed by: RADIOLOGY

## 2025-01-13 PROCEDURE — 99212 OFFICE O/P EST SF 10 MIN: CPT | Performed by: RADIOLOGY

## 2025-01-13 ASSESSMENT — FIBROSIS 4 INDEX: FIB4 SCORE: 1.73

## 2025-01-13 NOTE — CT SIMULATION
PATIENT NAME Raf Spear   PRIMARY PHYSICIAN Carranza, Ryan [place] [id number]   REFERRING PHYSICIAN Monica Chauhan M.D. 1955     Cutaneous follicle center lymphoma (HCC)  Staging form: Hodgkin and Non-Hodgkin Lymphoma, AJCC 8th Edition  - Clinical stage from 11/1/2024: Stage IE (Follicular lymphoma) - Signed by Monica Chauhan M.D. on 11/1/2024  Stage prefix: Initial diagnosis         Treatment Planning CT Simulation      Order Questions     Question Answer    Is this for a new course of treatment? Yes    Is this an Addendum? No    Implanted Device/Pacemaker No    Simulation Status Initial    Treatment Site Nose    Laterality Midline    Treatment Technique Electron Beam    Treatment Pattern/Frequency Daily    Number of fractions: 12    Concurrent Chemotherapy No    CT Technique 3D    Slice Thickness 2mm    Scan Extent H&N    Bowel Preparation No    Treatment Device(s) Aquaplast Mask    Patient Attire Gown    Patient Position Supine    Patient Orientation Head First    Arm Position Down    Treatment Machine No preference    Treatment Image Guidance CBCT    Frequency (CBCT) Daily    Image Guidance Match Bone    Other Orders Weekly Physics Check    Release to patient Immediate            Comments     Pt will call to schedule sim  Bolus, 0.5 cm, MD to wire/BB  Slight head turn to right  Nose plugs

## 2025-01-13 NOTE — PROGRESS NOTES
RADIATION ONCOLOGY FOLLOW-UP    Patient name:  Raf Spear    Primary Physician:  Ryan Carranza M.D. MRN: [id number]  [facility]: [id number]   Referring physician:  Monica Chauhan M.D.  : 1955, 69 y.o.     DATE OF SERVICE: 2025    IDENTIFICATION:   A 69 y.o. male with    Cutaneous follicle center lymphoma (HCC)  Staging form: Hodgkin and Non-Hodgkin Lymphoma, AJCC 8th Edition  - Clinical stage from 2024: Stage IE (Follicular lymphoma) - Signed by Monica Chauhan M.D. on 2024  Stage prefix: Initial diagnosis        HISTORY OF PRESENT ILLNESS:  Subjective      This is a 69-year-old gentleman who comes today to discuss treatment options for his cutaneous follicle center lymphoma.  In brief, he has a complex neurologic history, and is undergoing workup for possible ALS versus other motor neuron diseases, concurrently well being considered for his cervical spinal surgery by neurosurgery.  However, over the course of this, he saw his dermatologist who noted a small nodule at the left ala on his nose.  Initially this was thought to be likely just an epidermal cyst, we ultimately underwent a shave biopsy of this.  Pathology surprisingly confirmed follicle center B-cell lymphoma on .  As a result of this, he was referred to oncology and underwent a PET scan and saw medical oncology thereafter.  The PET scan noted a hypermetabolic right parotid nodule and no other obvious evidence of disease.  Of note, the right parotid nodule was previously biopsied earlier this summer and was noted to be benign.     Therefore, as a result of this, he now comes to discuss treatment options for the lymphoma.  In the meantime, his neck surgery is pending and his neurology workup is ongoing.          INTERVAL HISTORY:  2025 he comes today for routine follow-up and to plan his definitive radiation.  In the interim, he has ongoing workup with his neurologist.  He did complete his cervical spine surgery  that was planned.  He is recovering from this and still has a neck brace in place.  No concerning findings or lesions with regards to his cutaneous follicle center lymphoma, he now comes to plan definitive treatment.    PROBLEM LIST:  Patient Active Problem List   Diagnosis    Essential hypertension    Type 2 diabetes mellitus with microalbuminuria, without long-term current use of insulin (HCC)    Gastroesophageal reflux disease without esophagitis    CECILIA (generalized anxiety disorder)    Tubular adenoma    Lung nodules    Obstructive sleep apnea syndrome    BCC (basal cell carcinoma of skin)    Calculus of gallbladder without cholecystitis without obstruction    Colon, diverticulosis    Former smoker    Bilateral hearing loss    Microcytic anemia    Fatty liver    Other insomnia    Benign paroxysmal positional vertigo due to bilateral vestibular disorder    Other male erectile dysfunction    Obesity (BMI 30.0-34.9)    Postnasal drip    Ascending aorta dilation (HCC)    Kidney stone    Parotid mass    Cutaneous follicle center lymphoma (HCC)    History of colonic polyps    IgM lambda monoclonal gammopathy    Cervical spinal stenosis    QT prolongation    Postoperative hypoxia    Leucocytosis    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 For heart and blood pressure  Indications: High Blood Pressure 100 Tablet 3    methylPREDNISolone (MEDROL) 4 MG Tablet Therapy Pack Take 1 dose pk by oral route. 21 Tablet 0    methocarbamol (ROBAXIN) 500 MG Tab Take 2 tablets every 8 hours by oral route for 30 days. 180 Tablet 0    docusate sodium (COLACE) 100 MG Cap Take 1 capsule every day by oral route for 30 days. 30 Capsule 0    oxyCODONE-acetaminophen (PERCOCET) 5-325 MG Tab Take 1 tablet every 4-6 hours by oral route as  needed for 7 days. 42 Tablet 0    acetaminophen (TYLENOL) 500 MG Tab Take 500-1,000 mg by mouth every 6 hours as needed for Mild Pain or Moderate Pain. Indications: Pain      sertraline (ZOLOFT) 50 MG Tab Take 1 tablet by mouth every day. 100 Tablet 3    metFORMIN (GLUCOPHAGE) 500 MG Tab Take 1 Tablet by mouth 2 times a day with meals. 200 Tablet 3    Empagliflozin (JARDIANCE) 25 MG Tab Take 1 Tablet by mouth every day. 100 Tablet 3    valsartan (DIOVAN) 320 MG tablet Take 1 Tablet by mouth every day. 100 Tablet 3    omeprazole (PRILOSEC) 20 MG delayed-release capsule Take 1 capsule by mouth twice a day 30 min before meals 180 Capsule 4    amLODIPine (NORVASC) 5 MG Tab Take 1 Tablet by mouth every day. For high blood pressure 100 Tablet 3    amLODIPine (NORVASC) 2.5 MG Tab Take 1 Tablet by mouth every day. Along with amlodipine 5 mg for today of 7.5 mg/day for blood pressure 100 Tablet 3     No current facility-administered medications for this encounter.       ALLERGIES:  Patient has no known allergies.    REVIEW OF SYSTEMS:    A complete review of system taken. Pertinent items in HPI.  All others negative.    PHYSICAL EXAM:  PERFORMANCE STATUS:      10/31/2024    10:15 AM   ECOG Performance Review   ECOG Performance Status Fully active, able to carry on all pre-disease performance without restriction         10/31/2024    10:15 AM   Karnofsky Score   Karnofsky Score 100     /76 (BP Location: Right arm, Patient Position: Sitting, BP Cuff Size: Adult)   Pulse 73   Temp 36.1 °C (97 °F) (Temporal)   Wt 87.3 kg (192 lb 7.4 oz)   SpO2 98%   BMI 27.62 kg/m²   Physical Exam  Constitutional:       Appearance: Normal appearance.   HENT:      Head:        Comments: Well-healed small deficit of the left nasal ala.  Cardiovascular:      Rate and Rhythm: Normal rate.   Musculoskeletal:      Cervical back: Normal range of motion.   Lymphadenopathy:      Cervical: No cervical adenopathy.   Neurological:      General:  No focal deficit present.      Mental Status: He is alert and oriented to person, place, and time.         LABORATORY DATA:   Lab Results   Component Value Date/Time    WBC 15.1 (H) 12/20/2024 12:40 AM    RBC 4.94 12/20/2024 12:40 AM    HEMOGLOBIN 9.8 (L) 12/20/2024 12:40 AM    HEMATOCRIT 34.3 (L) 12/20/2024 12:40 AM    MCV 69.4 (L) 12/20/2024 12:40 AM    MCH 19.8 (L) 12/20/2024 12:40 AM    MCHC 28.6 (L) 12/20/2024 12:40 AM    RDW 45.2 12/20/2024 12:40 AM    PLATELETCT 224 12/20/2024 12:40 AM    MPV 9.8 12/20/2024 12:40 AM    NEUTSPOLYS 84.00 (H) 12/20/2024 12:40 AM    LYMPHOCYTES 6.00 (L) 12/20/2024 12:40 AM    MONOCYTES 9.10 12/20/2024 12:40 AM    EOSINOPHILS 0.00 12/20/2024 12:40 AM    BASOPHILS 0.10 12/20/2024 12:40 AM    HYPOCHROMIA 1+ 12/20/2024 12:40 AM    ANISOCYTOSIS 1+ 12/20/2024 12:40 AM      Lab Results   Component Value Date/Time    SODIUM 136 12/19/2024 12:33 AM    POTASSIUM 4.4 12/19/2024 12:33 AM    CHLORIDE 105 12/19/2024 12:33 AM    CO2 22 12/19/2024 12:33 AM    GLUCOSE 134 (H) 12/19/2024 12:33 AM    BUN 18 12/19/2024 12:33 AM    CREATININE 0.89 12/19/2024 12:33 AM         RADIOLOGY DATA:  DX-CERVICAL SPINE-2 OR 3 VIEWS    Result Date: 12/18/2024  Digitized intraoperative radiograph is submitted for review. This examination is not for diagnostic purpose but for guidance during a surgical procedure. Please see the patient's chart for full procedural details. INTERPRETING LOCATION: [address] [place] [address], [id number]    DX-CHEST-2 VIEWS    Result Date: 12/12/2024  No evidence of acute cardiopulmonary disease    DX-CHEST-LIMITED (1 VIEW)    Result Date: 12/19/2024  No acute cardiopulmonary disease.    DX-PORTABLE FLUORO > 1 HOUR    Result Date: 12/18/2024  Portable fluoroscopy utilized for 26 seconds. [facility] [id number]    DX-O-ARM    Result Date: 12/18/2024  Portable O-arm utilized for 3 seconds. INTERPRETING LOCATION: [address] [facility], [place], [address]      IMPRESSION:    A 69  y.o. with   Cutaneous follicle center lymphoma (HCC)  Staging form: Hodgkin and Non-Hodgkin Lymphoma, AJCC 8th Edition  - Clinical stage from 11/1/2024: Stage IE (Follicular lymphoma) - Signed by Monica Chauhan M.D. on 11/1/2024  Stage prefix: Initial diagnosis      CANCER STATUS:  Pending Start of Therapy    RECOMMENDATIONS:   At this point, I still recommend we proceed with definitive involved site radiation therapy for his cutaneous follicle center lymphoma.  However he now has neck brace in place.  The plan would be for likely 24-30 Gray in 12-15 fractions as per standard ILROG guidelines.  However, he may have some difficulty laying flat on the table given his neck brace.  He needs to have it on for another 3 to 4 weeks and has a follow-up scheduled with neurosurgery before he can take it off..  I have advised him to let us know when his brace comes off and when he is ready to proceed with treatment and we will arrange for CT simulation at that time.  Briefly reviewed logistics of treatment, expected length of treatment, and acute and long-term toxicities and ongoing follow-up.  He is in agreement.  Once he calls us we will schedule CT simulation.    Thank you for the opportunity to participate in his care.  If any questions or comments, please do not hesitate in calling.    Orders Placed This Encounter    Rad Onc Treatment Planning CT Simulation

## 2025-01-14 ENCOUNTER — HOME CARE VISIT (OUTPATIENT)
Dept: HOME HEALTH SERVICES | Facility: HOME HEALTHCARE | Age: 70
End: 2025-01-14
Payer: MEDICARE

## 2025-01-14 VITALS
SYSTOLIC BLOOD PRESSURE: 130 MMHG | TEMPERATURE: 98 F | DIASTOLIC BLOOD PRESSURE: 74 MMHG | RESPIRATION RATE: 16 BRPM | OXYGEN SATURATION: 94 % | HEART RATE: 79 BPM

## 2025-01-14 PROCEDURE — G0151 HHCP-SERV OF PT,EA 15 MIN: HCPCS

## 2025-01-14 ASSESSMENT — ENCOUNTER SYMPTOMS: DENIES PAIN: 1

## 2025-01-17 ENCOUNTER — APPOINTMENT (OUTPATIENT)
Dept: MEDICAL GROUP | Facility: MEDICAL CENTER | Age: 70
End: 2025-01-17
Payer: MEDICARE

## 2025-01-22 ENCOUNTER — HOME CARE VISIT (OUTPATIENT)
Dept: HOME HEALTH SERVICES | Facility: HOME HEALTHCARE | Age: 70
End: 2025-01-22
Payer: MEDICARE

## 2025-01-22 VITALS
HEART RATE: 78 BPM | OXYGEN SATURATION: 96 % | SYSTOLIC BLOOD PRESSURE: 130 MMHG | TEMPERATURE: 97.5 F | RESPIRATION RATE: 16 BRPM | DIASTOLIC BLOOD PRESSURE: 78 MMHG

## 2025-01-22 PROCEDURE — G0151 HHCP-SERV OF PT,EA 15 MIN: HCPCS

## 2025-01-22 ASSESSMENT — ACTIVITIES OF DAILY LIVING (ADL)
CURRENT_FUNCTION: INDEPENDENT
AMBULATION ASSISTANCE: INDEPENDENT
AMBULATION ASSISTANCE: 1
PHYSICAL TRANSFERS ASSESSED: 1

## 2025-01-22 ASSESSMENT — ENCOUNTER SYMPTOMS: DENIES PAIN: 1

## 2025-01-24 NOTE — CASE COMMUNICATION
Noted. Thank you.  ----- Message -----  From: Ana Lilia Scott, PT  Sent: 1/22/2025   4:57 PM PST  To: David Schumacher R.N.; Candelaria Back R.N.; *      Patient is PT discipline discharge today, all goals met.

## 2025-01-24 NOTE — CASE COMMUNICATION
Noted. Thank you.  ----- Message -----  From: Ana Lilia Scott, PT  Sent: 1/22/2025   5:31 PM PST  To: David Schumacher R.N.; Candelaria Back R.N.; *      [facility] signed for agency discharge 1/28/25.

## 2025-01-27 ENCOUNTER — PHARMACY VISIT (OUTPATIENT)
Dept: PHARMACY | Facility: MEDICAL CENTER | Age: 70
End: 2025-01-27
Payer: COMMERCIAL

## 2025-01-27 PROCEDURE — RXMED WILLOW AMBULATORY MEDICATION CHARGE: Performed by: FAMILY MEDICINE

## 2025-01-28 ENCOUNTER — HOME CARE VISIT (OUTPATIENT)
Dept: HOME HEALTH SERVICES | Facility: HOME HEALTHCARE | Age: 70
End: 2025-01-28
Payer: MEDICARE

## 2025-01-28 PROCEDURE — G0152 HHCP-SERV OF OT,EA 15 MIN: HCPCS

## 2025-01-28 NOTE — Clinical Note
DISCHARGE NARRATIVE  CURRENT LEVEL OF FUNCTION:   Ambulation and Mobility: Independent  Patient Equipment: no assistive device for ambulation:   Transferring: Independent  Bathing: Supervised  Dressing: Independent  Special equipment used: none  MEDICATION MANAGEMENT:  reminded by another person  Medication issues:   In the last 24 hours did the patient wear oxygen: no.  In the last 24 hours, when is the patient dyspneic or noticeably   Short of Breath : patient is not short of breath .  DISCHARGE/TRANSITION PLAN  The discharge plan for home health is to discharge back to the community when individualized goals have been met.  Individualized goals during this episode of Home Health care were related to: symptom management, surgical wound care, home safety and independence with ADLs.  Patient has met goals related to: all abovementioned deficits/concerns  Patient is ready for discharge on 1/28/25, with the following services in place: OPPT (script provided by surgeon to pt/wife)   Recent follow up with surgeon with good outcome, cleared him to start OPPT. However, pt is to continue wearing neck collar, which he was able to demo good care and management of. Pt is now able to perform most ADLs at Mod I level, except for shower, where he still receives distant supervision.

## 2025-01-28 NOTE — Clinical Note
noted, thank you!  ----- Message -----  From: Ankita Stauffer OT  Sent: 1/30/2025   7:58 AM PST  To: David Schumacher R.N.; Candelaria Back R.N.; *      DISCHARGE NARRATIVE  CURRENT LEVEL OF FUNCTION:   Ambulation and Mobility: Independent  Patient Equipment: no assistive device for ambulation:   Transferring: Independent  Bathing: Supervised  Dressing: Independent  Special equipment used: none  MEDICATION MANAGEMENT:  reminded by another person  Medication issues:   In the last 24 hours did the patient wear oxygen: no.  In the last 24 hours, when is the patient dyspneic or noticeably   Short of Breath : patient is not short of breath .  DISCHARGE/TRANSITION PLAN  The discharge plan for home health is to discharge back to the community when individualized goals have been met.  Individualized goals during this episode of Home Health care were related to: symptom management, surgical wound care, home safety and independence with ADLs.  Patient has met goals related to: all abovementioned deficits/concerns  Patient is ready for discharge on 1/28/25, with the following services in place: OPPT (script provided by surgeon to pt/wife)   Recent follow up with surgeon with good outcome, cleared him to start OPPT. However, pt is to continue wearing neck collar, which he was able to demo good care and management of. Pt is now able to perform most ADLs at Mod I level, except for shower, where he still receives distant supervision.

## 2025-01-29 ENCOUNTER — HOSPITAL ENCOUNTER (OUTPATIENT)
Dept: RADIATION ONCOLOGY | Facility: MEDICAL CENTER | Age: 70
End: 2025-01-29

## 2025-01-29 VITALS
RESPIRATION RATE: 15 BRPM | TEMPERATURE: 98 F | OXYGEN SATURATION: 94 % | DIASTOLIC BLOOD PRESSURE: 66 MMHG | HEART RATE: 76 BPM | SYSTOLIC BLOOD PRESSURE: 120 MMHG

## 2025-01-29 PROCEDURE — 77290 THER RAD SIMULAJ FIELD CPLX: CPT | Performed by: RADIOLOGY

## 2025-01-29 PROCEDURE — 77263 THER RADIOLOGY TX PLNG CPLX: CPT | Performed by: RADIOLOGY

## 2025-01-29 PROCEDURE — 77290 THER RAD SIMULAJ FIELD CPLX: CPT | Mod: 26 | Performed by: RADIOLOGY

## 2025-01-29 PROCEDURE — 77334 RADIATION TREATMENT AID(S): CPT | Mod: 26 | Performed by: RADIOLOGY

## 2025-01-29 PROCEDURE — 77334 RADIATION TREATMENT AID(S): CPT | Performed by: RADIOLOGY

## 2025-01-29 ASSESSMENT — ENCOUNTER SYMPTOMS
PAIN: 1
PAIN LOCATION - RELIEVING FACTORS: TYLENOL
PAIN LOCATION: R ARM
PAIN LOCATION - PAIN SEVERITY: 2/10
PAIN LOCATION - PAIN QUALITY: DULL ACHE
PAIN LOCATION - EXACERBATING FACTORS: SLEEP
PAIN LOCATION - PAIN DURATION: ON/OFF
PAIN LOCATION - PAIN FREQUENCY: INFREQUENT

## 2025-01-29 ASSESSMENT — ACTIVITIES OF DAILY LIVING (ADL): OASIS_M1830: 02

## 2025-01-29 NOTE — RADIATION PLANNING NOTES
DATE OF SERVICE: 1/29/2025    DIAGNOSIS:  Cutaneous follicle center lymphoma (HCC)  Staging form: Hodgkin and Non-Hodgkin Lymphoma, AJCC 8th Edition  - Clinical stage from 11/1/2024: Stage IE (Follicular lymphoma) - Signed by Monica Chauhan M.D. on 11/1/2024  Stage prefix: Initial diagnosis       DATE OF SERVICE: 1/29/2025    TYPE OF SIMULATION: Head & Neck    GOAL OF TREATMENT:   [x] Curative  [] Palliative  [] Oligometastatic    CONTRAST:    [] IV Contrast*  [] Oral Contrast               POSITION:    [x]  Supine  [] Prone     COMPLEX:  [x] Complex Blocking   []Arcs  [] Custom Blocks  [] >3 Sites    PROCEDURE: Patient place in supine position on CT table with head in neutral position. Dentures removed. Shoulder pulls used to stabilize shoulders. Patient head and shoulders were immobilized with a thermoplastic mask.   films evaluated to ensure proper patient position.  CT obtained through entire volume of interest. Exam pushed to treatment planning system for contouring and planning.    I have personally reviewed the relevant data, performed the target localization, and determined all relevant factors for this patient’s simulation.    *Omnipaque 80 -100cc IVP in conjunction with 500cc NS

## 2025-01-29 NOTE — RADIATION PLANNING NOTES
Clinical Treatment Planning Note    DATE OF SERVICE: 1/29/2025    DIAGNOSIS:  Cutaneous follicle center lymphoma (HCC)  Staging form: Hodgkin and Non-Hodgkin Lymphoma, AJCC 8th Edition  - Clinical stage from 11/1/2024: Stage IE (Follicular lymphoma) - Signed by Monica Chauhan M.D. on 11/1/2024  Stage prefix: Initial diagnosis         IMAGING REVIEWED:  [x] CT     [] MRI     [x] PET/CT     [] BONE SCAN     [] MAMMO     [] OTHER      TREATMENT INTENT:   [x] CURATIVE     [] MAINTENANCE     []  PALLIATIVE      []  SUPPORTIVE     []  PROPHYLACTIC     [] BENIGN     []  CONSOLIDATIVE      [] DEFINITIVE   []  OLOGIMETASTATIC      LINE OF TREATMENT:  [] ADJUVANT   [x] DEFINITIVE   [] NEOADJUVANT   [] RE-TREATMENT      TECHNIQUE PLANNED:  [] IMRT   [] 3D   [] SBRT   [] SRS/SRT   [] HDR   [x] ELECTRON       IMRT JUSTIFICATION:  []   An immediately adjacent area has been previously irradiated and abutting portals must be established with high precision.    []  Dose escalation is planned to deliver radiation doses in excess of those commonly utilized for similar tumors with conventional treatment.    []  The target volume is concave or convex, and the critical normal tissues are within or around that convexity or concavity.    []  The target volume is in close proximity to critical structures that must be protected.    []  The volume of interest must be covered with narrow margins to adequately protect  immediately adjacent structures.      FIELDS & BLOCKING:  [x] COMPLEX BLOCKS     []  = 3 TX AREAS     []  ARCS     []  CUSTOM SHEILD        []  SIMPLE BLOCK      CHEMOTHERAPY:  []  CONCURRENT     []  INDUCTION     [] SEQUENTIAL     []  <30 DAYS FROM XRT      NOTES:

## 2025-01-30 ENCOUNTER — HOME CARE VISIT (OUTPATIENT)
Dept: HOME HEALTH SERVICES | Facility: HOME HEALTHCARE | Age: 70
End: 2025-01-30
Payer: MEDICARE

## 2025-01-30 DIAGNOSIS — R80.9 TYPE 2 DIABETES MELLITUS WITH MICROALBUMINURIA, WITHOUT LONG-TERM CURRENT USE OF INSULIN (HCC): ICD-10-CM

## 2025-01-30 DIAGNOSIS — E11.29 TYPE 2 DIABETES MELLITUS WITH MICROALBUMINURIA, WITHOUT LONG-TERM CURRENT USE OF INSULIN (HCC): ICD-10-CM

## 2025-01-30 DIAGNOSIS — I10 ESSENTIAL HYPERTENSION: ICD-10-CM

## 2025-01-30 ASSESSMENT — ENCOUNTER SYMPTOMS
HIGHEST PAIN SEVERITY IN PAST 24 HOURS: 3/10
SUBJECTIVE PAIN PROGRESSION: UNCHANGED
LOWEST PAIN SEVERITY IN PAST 24 HOURS: 0/10
PAIN SEVERITY GOAL: 0/10

## 2025-01-30 ASSESSMENT — ACTIVITIES OF DAILY LIVING (ADL): HOME_HEALTH_OASIS: 01

## 2025-01-30 NOTE — Clinical Note
I AGREE WITH CHANGES MADE BELOW. THANK YOU //ROSELYN BE  ----- Message -----  From: Helen Velazquez R.N.  Sent: 1/30/2025  10:09 AM PST  To: Ankita Stauffer OT        Quality Review for 1.28.25 DC OASIS performed on by ALONDRA Velazquez RN on 1.30.2025:    Edits completed by ALONDRA Velazquez RN:  1. Added H to  per MAR pt is on percocet  2. Changed  C, E  to yes and F to na per POC  3. Changed  B1 for radiation therapy

## 2025-01-30 NOTE — CASE COMMUNICATION
Quality Review for 1.28.25 DC OASIS performed on by ALONDRA Velazquez RN on 1.30.2025:    Edits completed by ALONDRA Velazquez RN:  1. Added H to  per MAR pt is on percocet  2. Changed  C, E  to yes and F to na per POC  3. Changed  B1 for radiation therapy

## 2025-01-31 DIAGNOSIS — I10 ESSENTIAL HYPERTENSION: ICD-10-CM

## 2025-01-31 PROCEDURE — RXMED WILLOW AMBULATORY MEDICATION CHARGE: Performed by: FAMILY MEDICINE

## 2025-01-31 NOTE — CASE COMMUNICATION
Noted. Thank you.  ----- Message -----  From: Ankita Stauffer OT  Sent: 1/30/2025   7:58 AM PST  To: David Schumacher R.N.; Candelaria Back R.N.; *      DISCHARGE NARRATIVE  CURRENT LEVEL OF FUNCTION:   Ambulation and Mobility: Independent  Patient Equipment: no assistive device for ambulation:   Transferring: Independent  Bathing: Supervised  Dressing: Independent  Special equipment used: none  MEDICATION MANAGEMENT:  reminded by an other person  Medication issues:   In the last 24 hours did the patient wear oxygen: no.  In the last 24 hours, when is the patient dyspneic or noticeably   Short of Breath : patient is not short of breath .  DISCHARGE/TRANSITION PLAN  The discharge plan for home health is to discharge back to the community when individualized goals have been met.  Individualized goals during this episode of Home Health care were related to: symptom man agement, surgical wound care, home safety and independence with ADLs.  Patient has met goals related to: all abovementioned deficits/concerns  Patient is ready for discharge on 1/28/25, with the following services in place: OPPT (script provided by surgeon to pt/wife)   Recent follow up with surgeon with good outcome, cleared him to start OPPT. However, pt is to continue wearing neck collar, which he was able to demo good care and manag ement of. Pt is now able to perform most ADLs at Mod I level, except for shower, where he still receives distant supervision.

## 2025-02-03 ENCOUNTER — HOSPITAL ENCOUNTER (OUTPATIENT)
Dept: RADIATION ONCOLOGY | Facility: MEDICAL CENTER | Age: 70
End: 2025-02-03
Attending: RADIOLOGY
Payer: MEDICARE

## 2025-02-03 PROCEDURE — RXMED WILLOW AMBULATORY MEDICATION CHARGE: Performed by: FAMILY MEDICINE

## 2025-02-03 RX ORDER — AMLODIPINE BESYLATE 2.5 MG/1
2.5 TABLET ORAL DAILY
Qty: 100 TABLET | Refills: 3 | Status: SHIPPED | OUTPATIENT
Start: 2025-02-03

## 2025-02-03 RX ORDER — AMLODIPINE BESYLATE 5 MG/1
5 TABLET ORAL DAILY
Qty: 100 TABLET | Refills: 3 | Status: SHIPPED | OUTPATIENT
Start: 2025-02-03

## 2025-02-04 ENCOUNTER — PHARMACY VISIT (OUTPATIENT)
Dept: PHARMACY | Facility: MEDICAL CENTER | Age: 70
End: 2025-02-04
Payer: COMMERCIAL

## 2025-02-05 ENCOUNTER — PATIENT OUTREACH (OUTPATIENT)
Dept: HEALTH INFORMATION MANAGEMENT | Facility: OTHER | Age: 70
End: 2025-02-05
Payer: MEDICARE

## 2025-02-05 ENCOUNTER — HOSPITAL ENCOUNTER (OUTPATIENT)
Dept: RADIATION ONCOLOGY | Facility: MEDICAL CENTER | Age: 70
End: 2025-02-05
Attending: RADIOLOGY
Payer: MEDICARE

## 2025-02-05 ENCOUNTER — HOSPITAL ENCOUNTER (OUTPATIENT)
Dept: RADIATION ONCOLOGY | Facility: MEDICAL CENTER | Age: 70
End: 2025-02-05

## 2025-02-05 VITALS
RESPIRATION RATE: 18 BRPM | BODY MASS INDEX: 28.47 KG/M2 | DIASTOLIC BLOOD PRESSURE: 68 MMHG | TEMPERATURE: 97.5 F | HEART RATE: 73 BPM | OXYGEN SATURATION: 96 % | WEIGHT: 198.41 LBS | SYSTOLIC BLOOD PRESSURE: 129 MMHG

## 2025-02-05 LAB
CHEMOTHERAPY INFUSION START DATE: NORMAL
CHEMOTHERAPY RECORDS: 2 GY
CHEMOTHERAPY RECORDS: 2400 CGY
CHEMOTHERAPY RECORDS: NORMAL
CHEMOTHERAPY RX CANCER: NORMAL
DATE 1ST CHEMO CANCER: NORMAL
RAD ONC ARIA COURSE LAST TREATMENT DATE: NORMAL
RAD ONC ARIA COURSE TREATMENT ELAPSED DAYS: NORMAL
RAD ONC ARIA REFERENCE POINT DOSAGE GIVEN TO DATE: 2 GY
RAD ONC ARIA REFERENCE POINT ID: NORMAL
RAD ONC ARIA REFERENCE POINT SESSION DOSAGE GIVEN: 2 GY

## 2025-02-05 PROCEDURE — 77412 RADIATION TX DELIVERY LVL 3: CPT | Performed by: RADIOLOGY

## 2025-02-05 PROCEDURE — 77280 THER RAD SIMULAJ FIELD SMPL: CPT | Performed by: RADIOLOGY

## 2025-02-05 PROCEDURE — 77280 THER RAD SIMULAJ FIELD SMPL: CPT | Mod: 26 | Performed by: RADIOLOGY

## 2025-02-05 ASSESSMENT — PAIN SCALES - GENERAL: PAINLEVEL_OUTOF10: NO PAIN

## 2025-02-05 ASSESSMENT — FIBROSIS 4 INDEX: FIB4 SCORE: 1.73

## 2025-02-05 NOTE — ON TREATMENT VISIT
ON TREATMENT  NOTE  RADIATION ONCOLOGY DEPARTMENT    Patient name:  Raf Spear    Primary Physician:  Ryan Carranza M.D. MRN: [id number]  CSN: [id number]   Referring physician:  Shahrzad Rico A.P.*   : 1955, 69 y.o.     ENCOUNTER DATE:  2025      DIAGNOSIS:  Cutaneous follicle center lymphoma (HCC)  Staging form: Hodgkin and Non-Hodgkin Lymphoma, AJCC 8th Edition  - Clinical stage from 2024: Stage IE (Follicular lymphoma) - Signed by Monica Chauhan M.D. on 2024  Stage prefix: Initial diagnosis      TREATMENT SUMMARY:  Course First Treatment Date 2025  Course Last Treatment Date 2025  Radiation Therapy Episodes       Active Episodes       Radiation Therapy: Electron Beam                   Radiation Treatments         Plan Last Treated On Elapsed Days Fractions Treated Prescribed Fraction Dose (cGy) Prescribed Total Dose (cGy)    Lt Nose 2025 0 @ 2025 1 of 12 200 2,400                  Reference Point Last Treated On Elapsed Days Most Recent Session Dose (cGy) Total Dose (cGy)    Lt Nose 2025 0 @ 2025 200 200                               SUBJECTIVE:  Doing well no complaints    VITAL SIGNS:      2025     3:08 PM 2025     1:47 PM 2025    11:13 AM 2025    12:12 PM 2025    10:22 AM 1/10/2025    12:07 PM 1/10/2025    10:12 AM   Vitals   SYSTOLIC 129 120 130 130 137 128 128   DIASTOLIC 68 66 78 74 76 76 76   Pulse 73 76 78 79 73 67 67   Temperature 36.4 °C (97.5 °F) 36.7 °C (98 °F) 36.4 °C (97.5 °F) 36.7 °C (98 °F) 36.1 °C (97 °F) 36.3 °C (97.3 °F) 36.3 °C (97.3 °F)   Respiration 18 15 16 16  16 16   Weight 198.41    192.46     BMI 28.47 kg/m2    27.62 kg/m2     Pulse Oximetry 96 % 94 % 96 % 94 % 98 % 96 % 96 %     KPS: 90, Able to carry on normal activity; minor signs or symptoms of disease (ECOG equivalent 0)  Encounter Vitals  Temperature: 36.4 °C (97.5 °F)  Temp src: Temporal  Blood Pressure : 129/68  Pulse: 73  Respiration:  18  Pulse Oximetry: 96 %  Weight: 90 kg (198 lb 6.6 oz)  Weight Source: Stand Up Scale  Pain Score: No pain      2/5/2025     3:08 PM 11/4/2024    10:54 AM 10/31/2024    10:04 AM   Pain Assessment   Pain Score NO PAIN NO PAIN NO PAIN          PHYSICAL EXAM:  Skin intact, no changes over RT field        2/5/2025     3:10 PM   Toxicity Assessment   Toxicity Assessment Skin   Fatigue (lethargy, malaise, asthenia) None   Radiation Dermatitis None   Photosensitivity None   Dry Skin Normal   Alopecia Normal   Erythema Multiforme (e.g., Espinoza-Warren syndrome, toxic epidermal necrolysis) Absent   Nail Changes Normal   Wound (Non-Infectious) None   Wound (Infectious) None   RT - Pain due to RT None   Tumor Pain (onset or exacerbation of tumor pain due to treatment) None   Skin - Late RT No change from baseline       CURRENT MEDICATIONS:    Current Outpatient Medications:     amLODIPine (NORVASC) 5 MG Tab, Take 1 Tablet by mouth every day. For high blood pressure  Indications: High Blood Pressure, Disp: 100 Tablet, Rfl: 3    amLODIPine (NORVASC) 2.5 MG Tab, Take 1 Tablet by mouth every day. Along with amlodipine 5 mg for today of 7.5 mg/day for blood pressure  Indications: High Blood Pressure, Disp: 100 Tablet, Rfl: 3    metoprolol SR (TOPROL XL) 50 MG TABLET SR 24 HR, Take 1 Tablet by mouth every day. For heart and blood pressure  Indications: High Blood Pressure, Disp: 100 Tablet, Rfl: 3    methylPREDNISolone (MEDROL) 4 MG Tablet Therapy Pack, Take 1 dose pk by oral route., Disp: 21 Tablet, Rfl: 0    methocarbamol (ROBAXIN) 500 MG Tab, Take 2 tablets every 8 hours by oral route for 30 days., Disp: 180 Tablet, Rfl: 0    docusate sodium (COLACE) 100 MG Cap, Take 1 capsule every day by oral route for 30 days., Disp: 30 Capsule, Rfl: 0    oxyCODONE-acetaminophen (PERCOCET) 5-325 MG Tab, Take 1 tablet every 4-6 hours by oral route as needed for 7 days., Disp: 42 Tablet, Rfl: 0    acetaminophen (TYLENOL) 500 MG Tab, Take  500-1,000 mg by mouth every 6 hours as needed for Mild Pain or Moderate Pain. Indications: Pain, Disp: , Rfl:     sertraline (ZOLOFT) 50 MG Tab, Take 1 tablet by mouth every day., Disp: 100 Tablet, Rfl: 3    metFORMIN (GLUCOPHAGE) 500 MG Tab, Take 1 Tablet by mouth 2 times a day with meals., Disp: 200 Tablet, Rfl: 3    Empagliflozin (JARDIANCE) 25 MG Tab, Take 1 Tablet by mouth every day., Disp: 100 Tablet, Rfl: 3    valsartan (DIOVAN) 320 MG tablet, Take 1 Tablet by mouth every day., Disp: 100 Tablet, Rfl: 3    omeprazole (PRILOSEC) 20 MG delayed-release capsule, Take 1 capsule by mouth twice a day 30 min before meals, Disp: 180 Capsule, Rfl: 4    LABORATORY DATA:   Lab Results   Component Value Date/Time    SODIUM 136 12/19/2024 12:33 AM    POTASSIUM 4.4 12/19/2024 12:33 AM    CHLORIDE 105 12/19/2024 12:33 AM    CO2 22 12/19/2024 12:33 AM    GLUCOSE 134 (H) 12/19/2024 12:33 AM    BUN 18 12/19/2024 12:33 AM    CREATININE 0.89 12/19/2024 12:33 AM       Lab Results   Component Value Date/Time    WBC 15.1 (H) 12/20/2024 12:40 AM    RBC 4.94 12/20/2024 12:40 AM    HEMOGLOBIN 9.8 (L) 12/20/2024 12:40 AM    HEMATOCRIT 34.3 (L) 12/20/2024 12:40 AM    MCV 69.4 (L) 12/20/2024 12:40 AM    MCH 19.8 (L) 12/20/2024 12:40 AM    MCHC 28.6 (L) 12/20/2024 12:40 AM    PLATELETCT 224 12/20/2024 12:40 AM         RADIOLOGY DATA:  DX-CERVICAL SPINE-2 OR 3 VIEWS    Result Date: 12/18/2024  Digitized intraoperative radiograph is submitted for review. This examination is not for diagnostic purpose but for guidance during a surgical procedure. Please see the patient's chart for full procedural details. INTERPRETING LOCATION: [address] [place] [address] [place], [address]    DX-CHEST-2 VIEWS    Result Date: 12/12/2024  No evidence of acute cardiopulmonary disease    DX-CHEST-LIMITED (1 VIEW)    Result Date: 12/19/2024  No acute cardiopulmonary disease.    DX-PORTABLE FLUORO > 1 HOUR    Result Date: 12/18/2024  Portable fluoroscopy utilized for 26 seconds.  INTERPRETING LOCATION: [address] [facility] [place], [address]    DX-O-ARM    Result Date: 12/18/2024  Portable O-arm utilized for 3 seconds. [facility] [address]      IMPRESSION:  Cancer Staging   Cutaneous follicle center lymphoma (HCC)  Staging form: Hodgkin and Non-Hodgkin Lymphoma, AJCC 8th Edition  - Clinical stage from 11/1/2024: Stage IE (Follicular lymphoma) - Signed by Monica Chauhan M.D. on 11/1/2024      PLAN:  No change in treatment plan    Disposition:  Treatment plan and imaging reviewed. Questions answered. Continue therapy outlined.     Monica Chauhan M.D.    No orders of the defined types were placed in this encounter.

## 2025-02-05 NOTE — CT SIMULATION
DATE OF SERVICE: 2/5/2025    Radiation Therapy Episodes       Active Episodes       Radiation Therapy: Electron Beam                   Radiation Treatments         Plan Last Treated On Elapsed Days Fractions Treated Prescribed Fraction Dose (cGy) Prescribed Total Dose (cGy)    Lt Nose 2/5/2025 0 @ 02/05/2025 1 of 12 200 2,400                  Reference Point Last Treated On Elapsed Days Most Recent Session Dose (cGy) Total Dose (cGy)    Lt Nose 2/5/2025 0 @ 02/05/2025 200 200                            First Visit Simple Simulation: Called by Truebeam machine to verify treatment parameters including:  treatment site, treatment dose, and treatment setup prior to first treatment. Image derived shifts reviewed in all appropriate planes.  Shifts approved.  Patient treated.    I have personally reviewed the relevant data, performed the target localization, and determined all relevant factors for this patient’s simulation.

## 2025-02-06 ENCOUNTER — HOSPITAL ENCOUNTER (OUTPATIENT)
Dept: RADIATION ONCOLOGY | Facility: MEDICAL CENTER | Age: 70
End: 2025-02-06
Payer: MEDICARE

## 2025-02-06 LAB
CHEMOTHERAPY INFUSION START DATE: NORMAL
CHEMOTHERAPY RECORDS: 2 GY
CHEMOTHERAPY RECORDS: 2400 CGY
CHEMOTHERAPY RECORDS: NORMAL
CHEMOTHERAPY RX CANCER: NORMAL
DATE 1ST CHEMO CANCER: NORMAL
RAD ONC ARIA COURSE LAST TREATMENT DATE: NORMAL
RAD ONC ARIA COURSE TREATMENT ELAPSED DAYS: NORMAL
RAD ONC ARIA REFERENCE POINT DOSAGE GIVEN TO DATE: 4 GY
RAD ONC ARIA REFERENCE POINT ID: NORMAL
RAD ONC ARIA REFERENCE POINT SESSION DOSAGE GIVEN: 2 GY

## 2025-02-06 PROCEDURE — 77412 RADIATION TX DELIVERY LVL 3: CPT | Performed by: RADIOLOGY

## 2025-02-07 ENCOUNTER — HOSPITAL ENCOUNTER (OUTPATIENT)
Dept: RADIATION ONCOLOGY | Facility: MEDICAL CENTER | Age: 70
End: 2025-02-07
Payer: MEDICARE

## 2025-02-07 LAB
CHEMOTHERAPY INFUSION START DATE: NORMAL
CHEMOTHERAPY RECORDS: 2 GY
CHEMOTHERAPY RECORDS: 2400 CGY
CHEMOTHERAPY RECORDS: NORMAL
CHEMOTHERAPY RX CANCER: NORMAL
DATE 1ST CHEMO CANCER: NORMAL
RAD ONC ARIA COURSE LAST TREATMENT DATE: NORMAL
RAD ONC ARIA COURSE TREATMENT ELAPSED DAYS: NORMAL
RAD ONC ARIA REFERENCE POINT DOSAGE GIVEN TO DATE: 6 GY
RAD ONC ARIA REFERENCE POINT ID: NORMAL
RAD ONC ARIA REFERENCE POINT SESSION DOSAGE GIVEN: 2 GY

## 2025-02-07 PROCEDURE — 77412 RADIATION TX DELIVERY LVL 3: CPT | Performed by: RADIOLOGY

## 2025-02-07 PROCEDURE — 77336 RADIATION PHYSICS CONSULT: CPT | Performed by: RADIOLOGY

## 2025-02-10 ENCOUNTER — HOSPITAL ENCOUNTER (OUTPATIENT)
Dept: RADIATION ONCOLOGY | Facility: MEDICAL CENTER | Age: 70
End: 2025-02-10
Payer: MEDICARE

## 2025-02-10 LAB
CHEMOTHERAPY INFUSION START DATE: NORMAL
CHEMOTHERAPY RECORDS: 2 GY
CHEMOTHERAPY RECORDS: 2400 CGY
CHEMOTHERAPY RECORDS: NORMAL
CHEMOTHERAPY RX CANCER: NORMAL
DATE 1ST CHEMO CANCER: NORMAL
RAD ONC ARIA COURSE LAST TREATMENT DATE: NORMAL
RAD ONC ARIA COURSE TREATMENT ELAPSED DAYS: NORMAL
RAD ONC ARIA REFERENCE POINT DOSAGE GIVEN TO DATE: 8 GY
RAD ONC ARIA REFERENCE POINT ID: NORMAL
RAD ONC ARIA REFERENCE POINT SESSION DOSAGE GIVEN: 2 GY

## 2025-02-10 PROCEDURE — 77412 RADIATION TX DELIVERY LVL 3: CPT | Performed by: RADIOLOGY

## 2025-02-11 ENCOUNTER — HOSPITAL ENCOUNTER (OUTPATIENT)
Dept: RADIATION ONCOLOGY | Facility: MEDICAL CENTER | Age: 70
End: 2025-02-11
Payer: MEDICARE

## 2025-02-11 LAB
CHEMOTHERAPY INFUSION START DATE: NORMAL
CHEMOTHERAPY RECORDS: 2 GY
CHEMOTHERAPY RECORDS: 2400 CGY
CHEMOTHERAPY RECORDS: NORMAL
CHEMOTHERAPY RX CANCER: NORMAL
DATE 1ST CHEMO CANCER: NORMAL
RAD ONC ARIA COURSE LAST TREATMENT DATE: NORMAL
RAD ONC ARIA COURSE TREATMENT ELAPSED DAYS: NORMAL
RAD ONC ARIA REFERENCE POINT DOSAGE GIVEN TO DATE: 10 GY
RAD ONC ARIA REFERENCE POINT ID: NORMAL
RAD ONC ARIA REFERENCE POINT SESSION DOSAGE GIVEN: 2 GY

## 2025-02-11 PROCEDURE — 77412 RADIATION TX DELIVERY LVL 3: CPT | Performed by: RADIOLOGY

## 2025-02-11 PROCEDURE — 77427 RADIATION TX MANAGEMENT X5: CPT | Performed by: RADIOLOGY

## 2025-02-11 PROCEDURE — 77336 RADIATION PHYSICS CONSULT: CPT | Performed by: RADIOLOGY

## 2025-02-12 ENCOUNTER — HOSPITAL ENCOUNTER (OUTPATIENT)
Dept: RADIATION ONCOLOGY | Facility: MEDICAL CENTER | Age: 70
End: 2025-02-12
Attending: RADIOLOGY
Payer: MEDICARE

## 2025-02-12 ENCOUNTER — HOSPITAL ENCOUNTER (OUTPATIENT)
Dept: RADIATION ONCOLOGY | Facility: MEDICAL CENTER | Age: 70
End: 2025-02-12
Payer: MEDICARE

## 2025-02-12 VITALS
WEIGHT: 198.85 LBS | BODY MASS INDEX: 28.53 KG/M2 | RESPIRATION RATE: 18 BRPM | TEMPERATURE: 97.2 F | DIASTOLIC BLOOD PRESSURE: 75 MMHG | OXYGEN SATURATION: 97 % | HEART RATE: 87 BPM | SYSTOLIC BLOOD PRESSURE: 132 MMHG

## 2025-02-12 LAB
CHEMOTHERAPY INFUSION START DATE: NORMAL
CHEMOTHERAPY RECORDS: 2 GY
CHEMOTHERAPY RECORDS: 2400 CGY
CHEMOTHERAPY RECORDS: NORMAL
CHEMOTHERAPY RX CANCER: NORMAL
DATE 1ST CHEMO CANCER: NORMAL
RAD ONC ARIA COURSE LAST TREATMENT DATE: NORMAL
RAD ONC ARIA COURSE TREATMENT ELAPSED DAYS: NORMAL
RAD ONC ARIA REFERENCE POINT DOSAGE GIVEN TO DATE: 12 GY
RAD ONC ARIA REFERENCE POINT ID: NORMAL
RAD ONC ARIA REFERENCE POINT SESSION DOSAGE GIVEN: 2 GY

## 2025-02-12 PROCEDURE — 77412 RADIATION TX DELIVERY LVL 3: CPT | Performed by: RADIOLOGY

## 2025-02-12 ASSESSMENT — PAIN SCALES - GENERAL: PAINLEVEL_OUTOF10: NO PAIN

## 2025-02-12 ASSESSMENT — FIBROSIS 4 INDEX: FIB4 SCORE: 1.73

## 2025-02-12 NOTE — PROGRESS NOTES
Attempted to reach pt and introduce CCM services. Pt did not answer and is currently at Radiation Therapy. CHW will attempt to reach pt on 2/18/25.

## 2025-02-12 NOTE — ON TREATMENT VISIT
ON TREATMENT  NOTE  RADIATION ONCOLOGY DEPARTMENT    Patient name:  Raf Spear    Primary Physician:  Ryan Carranza M.D. MRN: [id number]  CSN: [id number]   Referring physician:  Shahrzad Rico A.P.*   : 1955, 69 y.o.     ENCOUNTER DATE:  2025      DIAGNOSIS:  Cutaneous follicle center lymphoma (HCC)  Staging form: Hodgkin and Non-Hodgkin Lymphoma, AJCC 8th Edition  - Clinical stage from 2024: Stage IE (Follicular lymphoma) - Signed by Monica Chauhan M.D. on 2024  Stage prefix: Initial diagnosis      TREATMENT SUMMARY:  Course First Treatment Date 2025  Course Last Treatment Date 2025  Radiation Therapy Episodes       Active Episodes       Radiation Therapy: Electron Beam                   Radiation Treatments         Plan Last Treated On Elapsed Days Fractions Treated Prescribed Fraction Dose (cGy) Prescribed Total Dose (cGy)    Lt Nose 2025 7 @ 2025 6 of 12 200 2,400                  Reference Point Last Treated On Elapsed Days Most Recent Session Dose (cGy) Total Dose (cGy)    Lt Nose 2025 7 @ 2025 200 1,200                               SUBJECTIVE:  Doing well, no complaints    VITAL SIGNS:      2025     2:54 PM 2025     3:08 PM 2025     1:47 PM 2025    11:13 AM 2025    12:12 PM 2025    10:22 AM 1/10/2025    12:07 PM   Vitals   SYSTOLIC 132 129 120 130 130 137 128   DIASTOLIC 75 68 66 78 74 76 76   Pulse 87 73 76 78 79 73 67   Temperature 36.2 °C (97.2 °F) 36.4 °C (97.5 °F) 36.7 °C (98 °F) 36.4 °C (97.5 °F) 36.7 °C (98 °F) 36.1 °C (97 °F) 36.3 °C (97.3 °F)   Respiration 18 18 15 16 16  16   Weight 198.86 198.41    192.46    BMI 28.53 kg/m2 28.47 kg/m2    27.62 kg/m2    Pulse Oximetry 97 % 96 % 94 % 96 % 94 % 98 % 96 %     KPS: 90, Able to carry on normal activity; minor signs or symptoms of disease (ECOG equivalent 0)  Encounter Vitals  Temperature: 36.2 °C (97.2 °F)  Temp src: Temporal  Blood Pressure :  132/75  Pulse: 87  Respiration: 18  Pulse Oximetry: 97 %  Weight: 90.2 kg (198 lb 13.7 oz)  Weight Source: Stand Up Scale  Pain Score: No pain      2/12/2025     2:54 PM 2/5/2025     3:08 PM 11/4/2024    10:54 AM 10/31/2024    10:04 AM   Pain Assessment   Pain Score NO PAIN NO PAIN NO PAIN NO PAIN          PHYSICAL EXAM:  No skin intact, prior skin telangiectasia intact, no focal changes        2/12/2025     2:55 PM 2/5/2025     3:10 PM   Toxicity Assessment   Toxicity Assessment Skin Skin   Fatigue (lethargy, malaise, asthenia) Increased fatigue over baseline, but not altering normal activities None   Radiation Dermatitis None None   Photosensitivity None None   Dry Skin Normal Normal   Alopecia Normal Normal   Erythema Multiforme (e.g., Espinoza-Warren syndrome, toxic epidermal necrolysis) Absent Absent   Nail Changes Normal Normal   Wound (Non-Infectious) None None   Wound (Infectious) None None   RT - Pain due to RT None None   Tumor Pain (onset or exacerbation of tumor pain due to treatment) None None   Skin - Late RT No change from baseline No change from baseline       CURRENT MEDICATIONS:    Current Outpatient Medications:     amLODIPine (NORVASC) 5 MG Tab, Take 1 Tablet by mouth every day. For high blood pressure  Indications: High Blood Pressure, Disp: 100 Tablet, Rfl: 3    amLODIPine (NORVASC) 2.5 MG Tab, Take 1 Tablet by mouth every day. Along with amlodipine 5 mg for today of 7.5 mg/day for blood pressure  Indications: High Blood Pressure, Disp: 100 Tablet, Rfl: 3    metoprolol SR (TOPROL XL) 50 MG TABLET SR 24 HR, Take 1 Tablet by mouth every day. For heart and blood pressure  Indications: High Blood Pressure, Disp: 100 Tablet, Rfl: 3    methylPREDNISolone (MEDROL) 4 MG Tablet Therapy Pack, Take 1 dose pk by oral route., Disp: 21 Tablet, Rfl: 0    methocarbamol (ROBAXIN) 500 MG Tab, Take 2 tablets every 8 hours by oral route for 30 days., Disp: 180 Tablet, Rfl: 0    docusate sodium (COLACE) 100 MG Cap,  Take 1 capsule every day by oral route for 30 days., Disp: 30 Capsule, Rfl: 0    oxyCODONE-acetaminophen (PERCOCET) 5-325 MG Tab, Take 1 tablet every 4-6 hours by oral route as needed for 7 days., Disp: 42 Tablet, Rfl: 0    acetaminophen (TYLENOL) 500 MG Tab, Take 500-1,000 mg by mouth every 6 hours as needed for Mild Pain or Moderate Pain. Indications: Pain, Disp: , Rfl:     sertraline (ZOLOFT) 50 MG Tab, Take 1 tablet by mouth every day., Disp: 100 Tablet, Rfl: 3    metFORMIN (GLUCOPHAGE) 500 MG Tab, Take 1 Tablet by mouth 2 times a day with meals., Disp: 200 Tablet, Rfl: 3    Empagliflozin (JARDIANCE) 25 MG Tab, Take 1 Tablet by mouth every day., Disp: 100 Tablet, Rfl: 3    valsartan (DIOVAN) 320 MG tablet, Take 1 Tablet by mouth every day., Disp: 100 Tablet, Rfl: 3    omeprazole (PRILOSEC) 20 MG delayed-release capsule, Take 1 capsule by mouth twice a day 30 min before meals, Disp: 180 Capsule, Rfl: 4    LABORATORY DATA:   Lab Results   Component Value Date/Time    SODIUM 136 12/19/2024 12:33 AM    POTASSIUM 4.4 12/19/2024 12:33 AM    CHLORIDE 105 12/19/2024 12:33 AM    CO2 22 12/19/2024 12:33 AM    GLUCOSE 134 (H) 12/19/2024 12:33 AM    BUN 18 12/19/2024 12:33 AM    CREATININE 0.89 12/19/2024 12:33 AM       Lab Results   Component Value Date/Time    WBC 15.1 (H) 12/20/2024 12:40 AM    RBC 4.94 12/20/2024 12:40 AM    HEMOGLOBIN 9.8 (L) 12/20/2024 12:40 AM    HEMATOCRIT 34.3 (L) 12/20/2024 12:40 AM    MCV 69.4 (L) 12/20/2024 12:40 AM    MCH 19.8 (L) 12/20/2024 12:40 AM    MCHC 28.6 (L) 12/20/2024 12:40 AM    PLATELETCT 224 12/20/2024 12:40 AM         RADIOLOGY DATA:  DX-CERVICAL SPINE-2 OR 3 VIEWS  Result Date: 12/18/2024  Digitized intraoperative radiograph is submitted for review. This examination is not for diagnostic purpose but for guidance during a surgical procedure. Please see the patient's chart for full procedural details. INTERPRETING LOCATION: [address] [place] [address], [place], [address]    DX-CHEST-LIMITED (1  VIEW)  Result Date: 12/19/2024  No acute cardiopulmonary disease.    DX-PORTABLE FLUORO > 1 HOUR  Result Date: 12/18/2024  Portable fluoroscopy utilized for 26 seconds. INTERPRETING LOCATION: [facility], [address]    DX-O-ARM  Result Date: 12/18/2024  Portable O-arm utilized for 3 seconds. INTERPRETING LOCATION: [address] [place] [address], [address]      IMPRESSION:  Cancer Staging   Cutaneous follicle center lymphoma (HCC)  Staging form: Hodgkin and Non-Hodgkin Lymphoma, AJCC 8th Edition  - Clinical stage from 11/1/2024: Stage IE (Follicular lymphoma) - Signed by Monica Chauhan M.D. on 11/1/2024      PLAN:  No change in treatment plan    Disposition:  Treatment plan and imaging reviewed. Questions answered. Continue therapy outlined.     Monica Chauhan M.D.    No orders of the defined types were placed in this encounter.

## 2025-02-13 ENCOUNTER — HOSPITAL ENCOUNTER (OUTPATIENT)
Dept: RADIATION ONCOLOGY | Facility: MEDICAL CENTER | Age: 70
End: 2025-02-13
Payer: MEDICARE

## 2025-02-13 LAB
CHEMOTHERAPY INFUSION START DATE: NORMAL
CHEMOTHERAPY RECORDS: 2 GY
CHEMOTHERAPY RECORDS: 2400 CGY
CHEMOTHERAPY RECORDS: NORMAL
CHEMOTHERAPY RX CANCER: NORMAL
DATE 1ST CHEMO CANCER: NORMAL
RAD ONC ARIA COURSE LAST TREATMENT DATE: NORMAL
RAD ONC ARIA COURSE TREATMENT ELAPSED DAYS: NORMAL
RAD ONC ARIA REFERENCE POINT DOSAGE GIVEN TO DATE: 14 GY
RAD ONC ARIA REFERENCE POINT ID: NORMAL
RAD ONC ARIA REFERENCE POINT SESSION DOSAGE GIVEN: 2 GY

## 2025-02-13 PROCEDURE — 77412 RADIATION TX DELIVERY LVL 3: CPT | Performed by: RADIOLOGY

## 2025-02-14 ENCOUNTER — HOSPITAL ENCOUNTER (OUTPATIENT)
Dept: RADIATION ONCOLOGY | Facility: MEDICAL CENTER | Age: 70
End: 2025-02-14
Payer: MEDICARE

## 2025-02-14 LAB
CHEMOTHERAPY INFUSION START DATE: NORMAL
CHEMOTHERAPY RECORDS: 2 GY
CHEMOTHERAPY RECORDS: 2400 CGY
CHEMOTHERAPY RECORDS: NORMAL
CHEMOTHERAPY RX CANCER: NORMAL
DATE 1ST CHEMO CANCER: NORMAL
RAD ONC ARIA COURSE LAST TREATMENT DATE: NORMAL
RAD ONC ARIA COURSE TREATMENT ELAPSED DAYS: NORMAL
RAD ONC ARIA REFERENCE POINT DOSAGE GIVEN TO DATE: 16 GY
RAD ONC ARIA REFERENCE POINT ID: NORMAL
RAD ONC ARIA REFERENCE POINT SESSION DOSAGE GIVEN: 2 GY

## 2025-02-14 PROCEDURE — 77412 RADIATION TX DELIVERY LVL 3: CPT | Performed by: RADIOLOGY

## 2025-02-18 ENCOUNTER — HOSPITAL ENCOUNTER (OUTPATIENT)
Dept: RADIATION ONCOLOGY | Facility: MEDICAL CENTER | Age: 70
End: 2025-02-18
Payer: MEDICARE

## 2025-02-18 ENCOUNTER — PATIENT OUTREACH (OUTPATIENT)
Dept: HEALTH INFORMATION MANAGEMENT | Facility: OTHER | Age: 70
End: 2025-02-18
Payer: MEDICARE

## 2025-02-18 LAB
CHEMOTHERAPY INFUSION START DATE: NORMAL
CHEMOTHERAPY RECORDS: 2 GY
CHEMOTHERAPY RECORDS: 2400 CGY
CHEMOTHERAPY RECORDS: NORMAL
CHEMOTHERAPY RX CANCER: NORMAL
DATE 1ST CHEMO CANCER: NORMAL
RAD ONC ARIA COURSE LAST TREATMENT DATE: NORMAL
RAD ONC ARIA COURSE TREATMENT ELAPSED DAYS: NORMAL
RAD ONC ARIA REFERENCE POINT DOSAGE GIVEN TO DATE: 18 GY
RAD ONC ARIA REFERENCE POINT ID: NORMAL
RAD ONC ARIA REFERENCE POINT SESSION DOSAGE GIVEN: 2 GY

## 2025-02-18 PROCEDURE — 77412 RADIATION TX DELIVERY LVL 3: CPT | Performed by: RADIOLOGY

## 2025-02-19 ENCOUNTER — HOSPITAL ENCOUNTER (OUTPATIENT)
Dept: RADIATION ONCOLOGY | Facility: MEDICAL CENTER | Age: 70
End: 2025-02-19
Payer: MEDICARE

## 2025-02-19 ENCOUNTER — HOSPITAL ENCOUNTER (OUTPATIENT)
Dept: RADIATION ONCOLOGY | Facility: MEDICAL CENTER | Age: 70
End: 2025-02-19
Attending: RADIOLOGY
Payer: MEDICARE

## 2025-02-19 VITALS
DIASTOLIC BLOOD PRESSURE: 69 MMHG | RESPIRATION RATE: 18 BRPM | HEART RATE: 79 BPM | WEIGHT: 201.72 LBS | SYSTOLIC BLOOD PRESSURE: 136 MMHG | OXYGEN SATURATION: 96 % | BODY MASS INDEX: 28.94 KG/M2 | TEMPERATURE: 96.8 F

## 2025-02-19 LAB
CHEMOTHERAPY INFUSION START DATE: NORMAL
CHEMOTHERAPY RECORDS: 2 GY
CHEMOTHERAPY RECORDS: 2400 CGY
CHEMOTHERAPY RECORDS: NORMAL
CHEMOTHERAPY RX CANCER: NORMAL
DATE 1ST CHEMO CANCER: NORMAL
RAD ONC ARIA COURSE LAST TREATMENT DATE: NORMAL
RAD ONC ARIA COURSE TREATMENT ELAPSED DAYS: NORMAL
RAD ONC ARIA REFERENCE POINT DOSAGE GIVEN TO DATE: 20 GY
RAD ONC ARIA REFERENCE POINT ID: NORMAL
RAD ONC ARIA REFERENCE POINT SESSION DOSAGE GIVEN: 2 GY

## 2025-02-19 PROCEDURE — 77427 RADIATION TX MANAGEMENT X5: CPT | Performed by: RADIOLOGY

## 2025-02-19 PROCEDURE — 77412 RADIATION TX DELIVERY LVL 3: CPT | Performed by: RADIOLOGY

## 2025-02-19 ASSESSMENT — FIBROSIS 4 INDEX: FIB4 SCORE: 1.73

## 2025-02-19 ASSESSMENT — PAIN SCALES - GENERAL: PAINLEVEL_OUTOF10: NO PAIN

## 2025-02-19 NOTE — PROGRESS NOTES
CHW attempted to reach pt and introduce CCM services. Pt is currenly at Radiation therapy. CHW will attempt to reach pt on 2/21/25.

## 2025-02-19 NOTE — ON TREATMENT VISIT
ON TREATMENT  NOTE  RADIATION ONCOLOGY DEPARTMENT    Patient name:  Raf Spear    Primary Physician:  Ryan Carranza M.D. MRN: [id number]  CSN: [id number]   Referring physician:  Shahrzad Rico A.P.*   : 1955, 69 y.o.     ENCOUNTER DATE:  2025      DIAGNOSIS:  Cutaneous follicle center lymphoma (HCC)  Staging form: Hodgkin and Non-Hodgkin Lymphoma, AJCC 8th Edition  - Clinical stage from 2024: Stage IE (Follicular lymphoma) - Signed by Monica Chauhan M.D. on 2024  Stage prefix: Initial diagnosis      TREATMENT SUMMARY:  Course First Treatment Date 2025  Course Last Treatment Date 2025  Radiation Therapy Episodes       Active Episodes       Radiation Therapy: Electron Beam                   Radiation Treatments         Plan Last Treated On Elapsed Days Fractions Treated Prescribed Fraction Dose (cGy) Prescribed Total Dose (cGy)    Lt Nose 2025 14 @ 2025 10 of 12 200 2,400                  Reference Point Last Treated On Elapsed Days Most Recent Session Dose (cGy) Total Dose (cGy)    Lt Nose 2025 14 @ 2025 200 2,000                               SUBJECTIVE:  Doing well, mild itching in the radiation area, otherwise no complaints    VITAL SIGNS:      2025     2:36 PM 2025     2:54 PM 2025     3:08 PM 2025     1:47 PM 2025    11:13 AM 2025    12:12 PM 2025    10:22 AM   Vitals   SYSTOLIC 136 132 129 120 130 130 137   DIASTOLIC 69 75 68 66 78 74 76   Pulse 79 87 73 76 78 79 73   Temperature 36 °C (96.8 °F) 36.2 °C (97.2 °F) 36.4 °C (97.5 °F) 36.7 °C (98 °F) 36.4 °C (97.5 °F) 36.7 °C (98 °F) 36.1 °C (97 °F)   Respiration 18 18 18 15 16 16    Weight 201.72 198.86 198.41    192.46   BMI 28.94 kg/m2 28.53 kg/m2 28.47 kg/m2    27.62 kg/m2   Pulse Oximetry 96 % 97 % 96 % 94 % 96 % 94 % 98 %     KPS: 90, Able to carry on normal activity; minor signs or symptoms of disease (ECOG equivalent 0)  Encounter Vitals  Temperature:  36 °C (96.8 °F)  Temp src: Temporal  Blood Pressure : 136/69  Pulse: 79  Respiration: 18  Pulse Oximetry: 96 %  Weight: 91.5 kg (201 lb 11.5 oz)  Weight Source: Stand Up Scale  Pain Score: No pain      2/19/2025     2:36 PM 2/12/2025     2:54 PM 2/5/2025     3:08 PM 11/4/2024    10:54 AM 10/31/2024    10:04 AM   Pain Assessment   Pain Score NO PAIN NO PAIN NO PAIN NO PAIN NO PAIN          PHYSICAL EXAM:  Mild erythema over the radiation field, some mild dry desquamation is present also, otherwise no changes        2/19/2025     2:37 PM 2/12/2025     2:55 PM 2/5/2025     3:10 PM   Toxicity Assessment   Toxicity Assessment Skin Skin Skin   Fatigue (lethargy, malaise, asthenia) None Increased fatigue over baseline, but not altering normal activities None   Radiation Dermatitis Faint erythema or dry desquamation None None   Photosensitivity None None None   Dry Skin Normal Normal Normal   Alopecia Normal Normal Normal   Erythema Multiforme (e.g., Espinoza-Warren syndrome, toxic epidermal necrolysis) Absent Absent Absent   Nail Changes Normal Normal Normal   Wound (Non-Infectious) None None None   Wound (Infectious) None None None   RT - Pain due to RT None None None   Tumor Pain (onset or exacerbation of tumor pain due to treatment) None None None   Skin - Late RT No change from baseline No change from baseline No change from baseline       CURRENT MEDICATIONS:    Current Outpatient Medications:     amLODIPine (NORVASC) 5 MG Tab, Take 1 Tablet by mouth every day. For high blood pressure  Indications: High Blood Pressure, Disp: 100 Tablet, Rfl: 3    amLODIPine (NORVASC) 2.5 MG Tab, Take 1 Tablet by mouth every day. Along with amlodipine 5 mg for today of 7.5 mg/day for blood pressure  Indications: High Blood Pressure, Disp: 100 Tablet, Rfl: 3    metoprolol SR (TOPROL XL) 50 MG TABLET SR 24 HR, Take 1 Tablet by mouth every day. For heart and blood pressure  Indications: High Blood Pressure, Disp: 100 Tablet, Rfl: 3     methylPREDNISolone (MEDROL) 4 MG Tablet Therapy Pack, Take 1 dose pk by oral route., Disp: 21 Tablet, Rfl: 0    methocarbamol (ROBAXIN) 500 MG Tab, Take 2 tablets every 8 hours by oral route for 30 days., Disp: 180 Tablet, Rfl: 0    docusate sodium (COLACE) 100 MG Cap, Take 1 capsule every day by oral route for 30 days., Disp: 30 Capsule, Rfl: 0    oxyCODONE-acetaminophen (PERCOCET) 5-325 MG Tab, Take 1 tablet every 4-6 hours by oral route as needed for 7 days., Disp: 42 Tablet, Rfl: 0    acetaminophen (TYLENOL) 500 MG Tab, Take 500-1,000 mg by mouth every 6 hours as needed for Mild Pain or Moderate Pain. Indications: Pain, Disp: , Rfl:     sertraline (ZOLOFT) 50 MG Tab, Take 1 tablet by mouth every day., Disp: 100 Tablet, Rfl: 3    metFORMIN (GLUCOPHAGE) 500 MG Tab, Take 1 Tablet by mouth 2 times a day with meals., Disp: 200 Tablet, Rfl: 3    Empagliflozin (JARDIANCE) 25 MG Tab, Take 1 Tablet by mouth every day., Disp: 100 Tablet, Rfl: 3    valsartan (DIOVAN) 320 MG tablet, Take 1 Tablet by mouth every day., Disp: 100 Tablet, Rfl: 3    omeprazole (PRILOSEC) 20 MG delayed-release capsule, Take 1 capsule by mouth twice a day 30 min before meals, Disp: 180 Capsule, Rfl: 4    LABORATORY DATA:   Lab Results   Component Value Date/Time    SODIUM 136 12/19/2024 12:33 AM    POTASSIUM 4.4 12/19/2024 12:33 AM    CHLORIDE 105 12/19/2024 12:33 AM    CO2 22 12/19/2024 12:33 AM    GLUCOSE 134 (H) 12/19/2024 12:33 AM    BUN 18 12/19/2024 12:33 AM    CREATININE 0.89 12/19/2024 12:33 AM       Lab Results   Component Value Date/Time    WBC 15.1 (H) 12/20/2024 12:40 AM    RBC 4.94 12/20/2024 12:40 AM    HEMOGLOBIN 9.8 (L) 12/20/2024 12:40 AM    HEMATOCRIT 34.3 (L) 12/20/2024 12:40 AM    MCV 69.4 (L) 12/20/2024 12:40 AM    MCH 19.8 (L) 12/20/2024 12:40 AM    MCHC 28.6 (L) 12/20/2024 12:40 AM    PLATELETCT 224 12/20/2024 12:40 AM         RADIOLOGY DATA:  No results found.    IMPRESSION:  Cancer Staging   Cutaneous follicle center  lymphoma (HCC)  Staging form: Hodgkin and Non-Hodgkin Lymphoma, AJCC 8th Edition  - Clinical stage from 11/1/2024: Stage IE (Follicular lymphoma) - Signed by Monica Chauhan M.D. on 11/1/2024      PLAN:  No change in treatment plan    Disposition:  Treatment plan and imaging reviewed. Questions answered. Continue therapy outlined.     Monica Chauhan M.D.    No orders of the defined types were placed in this encounter.

## 2025-02-20 ENCOUNTER — HOSPITAL ENCOUNTER (OUTPATIENT)
Dept: RADIATION ONCOLOGY | Facility: MEDICAL CENTER | Age: 70
End: 2025-02-20
Payer: MEDICARE

## 2025-02-20 LAB
CHEMOTHERAPY INFUSION START DATE: NORMAL
CHEMOTHERAPY RECORDS: 2 GY
CHEMOTHERAPY RECORDS: 2400 CGY
CHEMOTHERAPY RECORDS: NORMAL
CHEMOTHERAPY RX CANCER: NORMAL
DATE 1ST CHEMO CANCER: NORMAL
RAD ONC ARIA COURSE LAST TREATMENT DATE: NORMAL
RAD ONC ARIA COURSE TREATMENT ELAPSED DAYS: NORMAL
RAD ONC ARIA REFERENCE POINT DOSAGE GIVEN TO DATE: 22 GY
RAD ONC ARIA REFERENCE POINT ID: NORMAL
RAD ONC ARIA REFERENCE POINT SESSION DOSAGE GIVEN: 2 GY

## 2025-02-20 PROCEDURE — 77336 RADIATION PHYSICS CONSULT: CPT | Performed by: RADIOLOGY

## 2025-02-20 PROCEDURE — 77412 RADIATION TX DELIVERY LVL 3: CPT | Performed by: RADIOLOGY

## 2025-02-21 ENCOUNTER — PATIENT OUTREACH (OUTPATIENT)
Dept: HEALTH INFORMATION MANAGEMENT | Facility: OTHER | Age: 70
End: 2025-02-21
Payer: MEDICARE

## 2025-02-21 ENCOUNTER — HOSPITAL ENCOUNTER (OUTPATIENT)
Dept: RADIATION ONCOLOGY | Facility: MEDICAL CENTER | Age: 70
End: 2025-02-21
Payer: MEDICARE

## 2025-02-21 LAB
CHEMOTHERAPY INFUSION START DATE: NORMAL
CHEMOTHERAPY RECORDS: 2 GY
CHEMOTHERAPY RECORDS: 2400 CGY
CHEMOTHERAPY RECORDS: NORMAL
CHEMOTHERAPY RX CANCER: NORMAL
DATE 1ST CHEMO CANCER: NORMAL
RAD ONC ARIA COURSE LAST TREATMENT DATE: NORMAL
RAD ONC ARIA COURSE TREATMENT ELAPSED DAYS: NORMAL
RAD ONC ARIA REFERENCE POINT DOSAGE GIVEN TO DATE: 24 GY
RAD ONC ARIA REFERENCE POINT ID: NORMAL
RAD ONC ARIA REFERENCE POINT SESSION DOSAGE GIVEN: 2 GY

## 2025-02-21 PROCEDURE — 77412 RADIATION TX DELIVERY LVL 3: CPT | Performed by: RADIOLOGY

## 2025-02-21 NOTE — RADIATION COMPLETION NOTES
END OF TREATMENT SUMMARY    Patient name:  Raf Spear    Primary Physician:  Ryan Carranza M.D. MRN: [id number]  CSN: [id number]   Referring physician:  No ref. provider found  : 1955, 69 y.o.       TREATMENT SUMMARY:        Course First Treatment Date 2025    Course Last Treatment Date 2025   Course Elapsed Days 16 @ 2025   Course Intent Curative     Radiation Therapy Episodes       Active Episodes       Radiation Therapy: Electron Beam                   Radiation Treatments         Plan Last Treated On Elapsed Days Fractions Treated Prescribed Fraction Dose (cGy) Prescribed Total Dose (cGy)    Lt Nose 2025 16 @ 2025 12 of 12 200 2,400                  Reference Point Last Treated On Elapsed Days Most Recent Session Dose (cGy) Total Dose (cGy)    Lt Nose 2025 16 @ 2025 200 2,400                                     STAGE:   Cutaneous follicle center lymphoma (HCC)  Staging form: Hodgkin and Non-Hodgkin Lymphoma, AJCC 8th Edition  - Clinical stage from 2024: Stage IE (Follicular lymphoma) - Signed by Monica Chauhan M.D. on 2024  Stage prefix: Initial diagnosis       TREATMENT INDICATION:   ***     CONCURRENT SYSTEMIC TREATMENT:   ***     RT COURSE DISCONTINUED EARLY:   No     PATIENT EXPERIENCE:       2025     2:37 PM 2025     2:55 PM 2025     3:10 PM   Toxicity Assessment   Toxicity Assessment Skin Skin Skin   Fatigue (lethargy, malaise, asthenia) None Increased fatigue over baseline, but not altering normal activities None   Radiation Dermatitis Faint erythema or dry desquamation None None   Photosensitivity None None None   Dry Skin Normal Normal Normal   Alopecia Normal Normal Normal   Erythema Multiforme (e.g., Espinoza-Warren syndrome, toxic epidermal necrolysis) Absent Absent Absent   Nail Changes Normal Normal Normal   Wound (Non-Infectious) None None None   Wound (Infectious) None None None   RT - Pain due to RT None None  "None   Tumor Pain (onset or exacerbation of tumor pain due to treatment) None None None   Skin - Late RT No change from baseline No change from baseline No change from baseline        FOLLOW-UP PLAN:   {avpfollowuplist:54457::\"8 Weeks\"}     COMMENT:          ANATOMIC TARGET SUMMARY    ANATOMIC TARGET MODALITY TECHNIQUE   ***   {RAD ONC MODALITY:72078} {RAD ONC Technique:63170::\"IMRT\"}            COMMENT:         DIAGRAMS:      DOSE VOLUME HISTOGRAMS:            "

## 2025-02-21 NOTE — PROGRESS NOTES
Pt is currently at Radiation Therapy. CHW will attempt to reach pt again on 2/25 and introduce Ccm services.

## 2025-03-04 ENCOUNTER — PATIENT OUTREACH (OUTPATIENT)
Dept: HEALTH INFORMATION MANAGEMENT | Facility: OTHER | Age: 70
End: 2025-03-04
Payer: MEDICARE

## 2025-03-04 NOTE — PROGRESS NOTES
Unable to reach pt as he is at radiation therapy. Will attempt to introduce CCM services within the next 4 weeks.

## 2025-03-06 ENCOUNTER — HOSPITAL ENCOUNTER (OUTPATIENT)
Dept: LAB | Facility: MEDICAL CENTER | Age: 70
End: 2025-03-06
Attending: NURSE PRACTITIONER
Payer: MEDICARE

## 2025-03-06 ENCOUNTER — HOSPITAL ENCOUNTER (OUTPATIENT)
Dept: LAB | Facility: MEDICAL CENTER | Age: 70
End: 2025-03-06
Attending: FAMILY MEDICINE
Payer: MEDICARE

## 2025-03-06 DIAGNOSIS — E11.29 TYPE 2 DIABETES MELLITUS WITH MICROALBUMINURIA, WITHOUT LONG-TERM CURRENT USE OF INSULIN (HCC): ICD-10-CM

## 2025-03-06 DIAGNOSIS — R80.9 TYPE 2 DIABETES MELLITUS WITH MICROALBUMINURIA, WITHOUT LONG-TERM CURRENT USE OF INSULIN (HCC): ICD-10-CM

## 2025-03-06 DIAGNOSIS — D47.2 IGM LAMBDA MONOCLONAL GAMMOPATHY: ICD-10-CM

## 2025-03-06 LAB
ALBUMIN SERPL BCP-MCNC: 4.1 G/DL (ref 3.2–4.9)
ALBUMIN SERPL BCP-MCNC: 4.2 G/DL (ref 3.2–4.9)
ALBUMIN/GLOB SERPL: 1.3 G/DL
ALBUMIN/GLOB SERPL: 1.5 G/DL
ALP SERPL-CCNC: 80 U/L (ref 30–99)
ALP SERPL-CCNC: 80 U/L (ref 30–99)
ALT SERPL-CCNC: 52 U/L (ref 2–50)
ALT SERPL-CCNC: 54 U/L (ref 2–50)
ANION GAP SERPL CALC-SCNC: 12 MMOL/L (ref 7–16)
ANION GAP SERPL CALC-SCNC: 13 MMOL/L (ref 7–16)
AST SERPL-CCNC: 30 U/L (ref 12–45)
AST SERPL-CCNC: 31 U/L (ref 12–45)
BILIRUB SERPL-MCNC: 0.3 MG/DL (ref 0.1–1.5)
BILIRUB SERPL-MCNC: 0.3 MG/DL (ref 0.1–1.5)
BUN SERPL-MCNC: 16 MG/DL (ref 8–22)
BUN SERPL-MCNC: 17 MG/DL (ref 8–22)
CALCIUM ALBUM COR SERPL-MCNC: 9.1 MG/DL (ref 8.5–10.5)
CALCIUM ALBUM COR SERPL-MCNC: 9.1 MG/DL (ref 8.5–10.5)
CALCIUM SERPL-MCNC: 9.2 MG/DL (ref 8.5–10.5)
CALCIUM SERPL-MCNC: 9.3 MG/DL (ref 8.5–10.5)
CHLORIDE SERPL-SCNC: 104 MMOL/L (ref 96–112)
CHLORIDE SERPL-SCNC: 105 MMOL/L (ref 96–112)
CHOLEST SERPL-MCNC: 111 MG/DL (ref 100–199)
CO2 SERPL-SCNC: 21 MMOL/L (ref 20–33)
CO2 SERPL-SCNC: 23 MMOL/L (ref 20–33)
CREAT SERPL-MCNC: 1.02 MG/DL (ref 0.5–1.4)
CREAT SERPL-MCNC: 1.02 MG/DL (ref 0.5–1.4)
EST. AVERAGE GLUCOSE BLD GHB EST-MCNC: 143 MG/DL
FASTING STATUS PATIENT QL REPORTED: NORMAL
GFR SERPLBLD CREATININE-BSD FMLA CKD-EPI: 78 ML/MIN/1.73 M 2
GFR SERPLBLD CREATININE-BSD FMLA CKD-EPI: 78 ML/MIN/1.73 M 2
GLOBULIN SER CALC-MCNC: 2.8 G/DL (ref 1.9–3.5)
GLOBULIN SER CALC-MCNC: 3.1 G/DL (ref 1.9–3.5)
GLUCOSE SERPL-MCNC: 113 MG/DL (ref 65–99)
GLUCOSE SERPL-MCNC: 115 MG/DL (ref 65–99)
HBA1C MFR BLD: 6.6 % (ref 4–5.6)
HDLC SERPL-MCNC: 29 MG/DL
LDLC SERPL CALC-MCNC: 46 MG/DL
POTASSIUM SERPL-SCNC: 4.4 MMOL/L (ref 3.6–5.5)
POTASSIUM SERPL-SCNC: 4.4 MMOL/L (ref 3.6–5.5)
PROT SERPL-MCNC: 7 G/DL (ref 6–8.2)
PROT SERPL-MCNC: 7.2 G/DL (ref 6–8.2)
SODIUM SERPL-SCNC: 138 MMOL/L (ref 135–145)
SODIUM SERPL-SCNC: 140 MMOL/L (ref 135–145)
TRIGL SERPL-MCNC: 179 MG/DL (ref 0–149)

## 2025-03-06 PROCEDURE — 83521 IG LIGHT CHAINS FREE EACH: CPT

## 2025-03-06 PROCEDURE — 80053 COMPREHEN METABOLIC PANEL: CPT

## 2025-03-06 PROCEDURE — 84155 ASSAY OF PROTEIN SERUM: CPT | Mod: XU

## 2025-03-06 PROCEDURE — 82784 ASSAY IGA/IGD/IGG/IGM EACH: CPT

## 2025-03-06 PROCEDURE — 83036 HEMOGLOBIN GLYCOSYLATED A1C: CPT

## 2025-03-06 PROCEDURE — 80061 LIPID PANEL: CPT

## 2025-03-06 PROCEDURE — 82043 UR ALBUMIN QUANTITATIVE: CPT

## 2025-03-06 PROCEDURE — 84165 PROTEIN E-PHORESIS SERUM: CPT

## 2025-03-06 PROCEDURE — 36415 COLL VENOUS BLD VENIPUNCTURE: CPT

## 2025-03-06 PROCEDURE — 80053 COMPREHEN METABOLIC PANEL: CPT | Mod: 91

## 2025-03-06 PROCEDURE — 86334 IMMUNOFIX E-PHORESIS SERUM: CPT

## 2025-03-06 PROCEDURE — 82570 ASSAY OF URINE CREATININE: CPT

## 2025-03-06 PROCEDURE — 85025 COMPLETE CBC W/AUTO DIFF WBC: CPT

## 2025-03-07 ENCOUNTER — RESULTS FOLLOW-UP (OUTPATIENT)
Dept: MEDICAL GROUP | Facility: MEDICAL CENTER | Age: 70
End: 2025-03-07
Payer: MEDICARE

## 2025-03-07 LAB
ANISOCYTOSIS BLD QL SMEAR: ABNORMAL
BASOPHILS # BLD AUTO: 1.2 % (ref 0–1.8)
BASOPHILS # BLD: 0.08 K/UL (ref 0–0.12)
COMMENT 1642: NORMAL
CREAT UR-MCNC: 150 MG/DL
EOSINOPHIL # BLD AUTO: 0.16 K/UL (ref 0–0.51)
EOSINOPHIL NFR BLD: 2.5 % (ref 0–6.9)
ERYTHROCYTE [DISTWIDTH] IN BLOOD BY AUTOMATED COUNT: 49.7 FL (ref 35.9–50)
HCT VFR BLD AUTO: 40.1 % (ref 42–52)
HGB BLD-MCNC: 11.1 G/DL (ref 14–18)
HYPOCHROMIA BLD QL SMEAR: ABNORMAL
IMM GRANULOCYTES # BLD AUTO: 0.03 K/UL (ref 0–0.11)
IMM GRANULOCYTES NFR BLD AUTO: 0.5 % (ref 0–0.9)
LYMPHOCYTES # BLD AUTO: 1.33 K/UL (ref 1–4.8)
LYMPHOCYTES NFR BLD: 20.6 % (ref 22–41)
MCH RBC QN AUTO: 19.1 PG (ref 27–33)
MCHC RBC AUTO-ENTMCNC: 27.7 G/DL (ref 32.3–36.5)
MCV RBC AUTO: 69 FL (ref 81.4–97.8)
MICROALBUMIN UR-MCNC: 99.8 MG/DL
MICROALBUMIN/CREAT UR: 665 MG/G (ref 0–30)
MICROCYTES BLD QL SMEAR: ABNORMAL
MONOCYTES # BLD AUTO: 0.58 K/UL (ref 0–0.85)
MONOCYTES NFR BLD AUTO: 9 % (ref 0–13.4)
MORPHOLOGY BLD-IMP: NORMAL
NEUTROPHILS # BLD AUTO: 4.27 K/UL (ref 1.82–7.42)
NEUTROPHILS NFR BLD: 66.2 % (ref 44–72)
NRBC # BLD AUTO: 0 K/UL
NRBC BLD-RTO: 0 /100 WBC (ref 0–0.2)
PLATELET # BLD AUTO: 322 K/UL (ref 164–446)
PLATELET BLD QL SMEAR: NORMAL
PMV BLD AUTO: 9.5 FL (ref 9–12.9)
RBC # BLD AUTO: 5.81 M/UL (ref 4.7–6.1)
RBC BLD AUTO: PRESENT
WBC # BLD AUTO: 6.5 K/UL (ref 4.8–10.8)

## 2025-03-10 LAB
ALBUMIN SERPL ELPH-MCNC: 3.88 G/DL (ref 3.75–5.01)
ALPHA1 GLOB SERPL ELPH-MCNC: 0.26 G/DL (ref 0.19–0.46)
ALPHA2 GLOB SERPL ELPH-MCNC: 0.99 G/DL (ref 0.48–1.05)
B-GLOBULIN SERPL ELPH-MCNC: 0.82 G/DL (ref 0.48–1.1)
EER MONOCLONAL PROTEIN AND FLC, SERUM Q5224: ABNORMAL
GAMMA GLOB SERPL ELPH-MCNC: 0.86 G/DL (ref 0.62–1.51)
IGA SERPL-MCNC: 185 MG/DL (ref 68–408)
IGG SERPL-MCNC: 575 MG/DL (ref 768–1632)
IGM SERPL-MCNC: 465 MG/DL (ref 35–263)
INTERPRETATION SERPL IFE-IMP: ABNORMAL
INTERPRETATION SERPL IFE-IMP: ABNORMAL
KAPPA LC FREE SER-MCNC: 25.69 MG/L (ref 3.3–19.4)
KAPPA LC FREE/LAMBDA FREE SER NEPH: 1.07 {RATIO} (ref 0.26–1.65)
LAMBDA LC FREE SERPL-MCNC: 24.05 MG/L (ref 5.71–26.3)
MONOCLONAL PROTEIN NL11656: 0.44 G/DL
PROT SERPL-MCNC: 6.8 G/DL (ref 6.3–8.2)

## 2025-03-16 PROCEDURE — RXMED WILLOW AMBULATORY MEDICATION CHARGE: Performed by: FAMILY MEDICINE

## 2025-03-18 ENCOUNTER — OFFICE VISIT (OUTPATIENT)
Dept: MEDICAL GROUP | Facility: MEDICAL CENTER | Age: 70
End: 2025-03-18
Payer: MEDICARE

## 2025-03-18 ENCOUNTER — PHARMACY VISIT (OUTPATIENT)
Dept: PHARMACY | Facility: MEDICAL CENTER | Age: 70
End: 2025-03-18
Payer: COMMERCIAL

## 2025-03-18 VITALS
OXYGEN SATURATION: 96 % | DIASTOLIC BLOOD PRESSURE: 80 MMHG | HEART RATE: 79 BPM | SYSTOLIC BLOOD PRESSURE: 138 MMHG | HEIGHT: 70 IN | TEMPERATURE: 98 F | WEIGHT: 203.71 LBS | BODY MASS INDEX: 29.16 KG/M2

## 2025-03-18 DIAGNOSIS — Z12.12 SCREENING FOR COLORECTAL CANCER: ICD-10-CM

## 2025-03-18 DIAGNOSIS — E78.1 HYPERTRIGLYCERIDEMIA: ICD-10-CM

## 2025-03-18 DIAGNOSIS — R74.01 ELEVATED ALT MEASUREMENT: ICD-10-CM

## 2025-03-18 DIAGNOSIS — E11.29 TYPE 2 DIABETES MELLITUS WITH MICROALBUMINURIA, WITHOUT LONG-TERM CURRENT USE OF INSULIN (HCC): ICD-10-CM

## 2025-03-18 DIAGNOSIS — R80.9 TYPE 2 DIABETES MELLITUS WITH MICROALBUMINURIA, WITHOUT LONG-TERM CURRENT USE OF INSULIN (HCC): ICD-10-CM

## 2025-03-18 DIAGNOSIS — Z12.11 SCREENING FOR COLORECTAL CANCER: ICD-10-CM

## 2025-03-18 PROCEDURE — 3079F DIAST BP 80-89 MM HG: CPT | Performed by: FAMILY MEDICINE

## 2025-03-18 PROCEDURE — 99214 OFFICE O/P EST MOD 30 MIN: CPT | Performed by: FAMILY MEDICINE

## 2025-03-18 PROCEDURE — 3075F SYST BP GE 130 - 139MM HG: CPT | Performed by: FAMILY MEDICINE

## 2025-03-18 ASSESSMENT — ENCOUNTER SYMPTOMS
FEVER: 0
PALPITATIONS: 0
CHILLS: 0

## 2025-03-18 ASSESSMENT — FIBROSIS 4 INDEX: FIB4 SCORE: 0.92

## 2025-03-18 NOTE — LETTER
[place]  Ryan Carranza M.D.  [address]  [place] [address]  Fax: [phone]   Authorization for Release/Disclosure of   Protected Health Information   Name: SELVIN SPEAR : 1955 SSN: [id number]   Address: [address] [place] [id number] Phone:    There are no phone numbers on file.   I authorize the entity listed below to release/disclose the PHI below to:   [facility]/Ryan Carranza M.D. and Ryan Carranza M.D.   Provider or Entity Name:     Address   City, State, Zip   Phone:      Fax:     Reason for request: continuity of care   Information to be released:    [  ] LAST COLONOSCOPY,  including any PATH REPORT and follow-up  [  ] LAST FIT/COLOGUARD RESULT [  ] LAST DEXA  [  ] LAST MAMMOGRAM  [  ] LAST PAP  [  ] LAST LABS [  ] RETINA EXAM REPORT  [  ] IMMUNIZATION RECORDS  [  ] Release all info      [  ] Check here and initial the line next to each item to release ALL health information INCLUDING  _____ Care and treatment for drug and / or alcohol abuse  _____ HIV testing, infection status, or AIDS  _____ Genetic Testing    DATES OF SERVICE OR TIME PERIOD TO BE DISCLOSED: _____________  I understand and acknowledge that:  * This Authorization may be revoked at any time by you in writing, except if your health information has already been used or disclosed.  * Your health information that will be used or disclosed as a result of you signing this authorization could be re-disclosed by the recipient. If this occurs, your re-disclosed health information may no longer be protected by State or Federal laws.  * You may refuse to sign this Authorization. Your refusal will not affect your ability to obtain treatment.  * This Authorization becomes effective upon signing and will  on (date) __________.      If no date is indicated, this Authorization will  one (1) year from the signature date.    Name: Selvin Spear  Signature: Date:   3/18/2025      PLEASE FAX REQUESTED RECORDS BACK TO: [phone]

## 2025-03-18 NOTE — PROGRESS NOTES
Verbal consent was acquired by the patient to use [employer] ambient listening note generation during this visit.      Preston was seen today for follow-up.    Diagnoses and all orders for this visit:    Type 2 diabetes mellitus with microalbuminuria, without long-term current use of insulin (HCC)  -     HEMOGLOBIN A1C; Future  -     Lipid Profile; Future  -     VITAMIN B12; Future    Elevated ALT measurement  -     Comp Metabolic Panel; Future    Screening for colorectal cancer  -     Referral to GI for Colonoscopy    Hypertriglyceridemia               Assessment & Plan  1.  Type 2 diabetes mellitus  Chronic condition, stable  - Episode during physical therapy likely due to not eating breakfast and consuming only coffee  - Symptoms: sweating and feeling lightheaded  - A1c level: 6.6 (not excessively low)  - Advise to eat a good breakfast before taking medications, including metformin  - Recommend Dexcom Stelo glucose monitor for continuous monitoring (available over the counter)  -Continue metformin and Jardiance at same dose   -Scheduled for eye exam with ophthalmologist    3. Hypertension:  Chronic condition, borderline control  - Blood pressure elevated at 144/82 during visit, repeat came back at 138/80.  Monitor blood pressure at home  - Advise to monitor blood pressure daily in the morning  - Current medications: amlodipine 7.5 mg, valsartan 320 mg, metoprolol 50 mg daily  - Continue medications as prescribed  -Monitor blood pressure at home    4. Hypercholesterolemia:  Chronic condition, unstable  - Elevated cholesterol levels, particularly triglycerides  - Advise to reduce intake of carbs and sugars  - Blood test to be ordered in 3 months to re-evaluate cholesterol levels    5. Elevated liver enzymes:  - Slightly elevated liver function tests  - Follow-up blood test to be ordered in 3 months to monitor liver function    6. Health Maintenance:  - Due for a colonoscopy; referral to GI specialist initiated  -  Upcoming eye exam scheduled; instruct to request ophthalmologist to forward results to our office  - [facility] paperwork for license filled and given to patient    Follow-up in 3 to 4 months for annual and lab follow-up.          Chief complaint::Diagnoses of Type 2 diabetes mellitus with microalbuminuria, without long-term current use of insulin (HCC), Elevated ALT measurement, Screening for colorectal cancer, and Hypertriglyceridemia were pertinent to this visit.      History of Present Illness  The patient is a 69-year-old male who presents for follow-up to discuss medications.    Profuse Sweating and Hypotension  - Experienced an episode of profuse sweating during his physical therapy session yesterday  - Accompanied by hypotension  - Blood pressure is elevated today  - Had not consumed any food prior to the session, except for two cups of coffee  - Therapist provided apple juice and fruit snacks, which alleviated the sweating  - Considering the use of a glucose monitor  - Advised to monitor blood pressure daily in the morning  - Recalls a similar episode occurring in either October or November while descending into [place] on a flight from [place], during which he had also not eaten    Medications  - Currently on amlodipine 7.5 mg, valsartan 320 mg, and metoprolol for blood pressure management  - Taking metformin and Jardiance for diabetes management    Eye Examination  - Last eye examination was conducted over a year ago  - Recently scheduled another appointment    Colonoscopy  - Due for a colonoscopy  - Referral to a gastroenterologist has been initiated    Driving  - Reports no issues with driving  - Has not yet resumed driving since his surgery  - Cleared to drive by Dr. Merritt  - Able to fully turn his neck to one side without experiencing dizziness    Weight and Physical Limitations  - Lost 15 pounds post-surgery but has since regained the weight        Review of Systems   Constitutional:  Negative for chills  "and fever.   Cardiovascular:  Negative for chest pain, palpitations and leg swelling.          Medications and Allergies:     Current Outpatient Medications   Medication Sig Dispense Refill    amLODIPine (NORVASC) 5 MG Tab Take 1 Tablet by mouth every day. For high blood pressure  Indications: High Blood Pressure 100 Tablet 3    amLODIPine (NORVASC) 2.5 MG Tab Take 1 Tablet by mouth every day. Along with amlodipine 5 mg for today of 7.5 mg/day for blood pressure  Indications: High Blood Pressure 100 Tablet 3    metoprolol SR (TOPROL XL) 50 MG TABLET SR 24 HR Take 1 Tablet by mouth every day. For heart and blood pressure  Indications: High Blood Pressure 100 Tablet 3    acetaminophen (TYLENOL) 500 MG Tab Take 500-1,000 mg by mouth every 6 hours as needed for Mild Pain or Moderate Pain. Indications: Pain      sertraline (ZOLOFT) 50 MG Tab Take 1 tablet by mouth every day. 100 Tablet 3    metFORMIN (GLUCOPHAGE) 500 MG Tab Take 1 Tablet by mouth 2 times a day with meals. 200 Tablet 3    Empagliflozin (JARDIANCE) 25 MG Tab Take 1 Tablet by mouth every day. 100 Tablet 3    valsartan (DIOVAN) 320 MG tablet Take 1 Tablet by mouth every day. 100 Tablet 3    omeprazole (PRILOSEC) 20 MG delayed-release capsule Take 1 capsule by mouth twice a day 30 min before meals 180 Capsule 4     No current facility-administered medications for this visit.       /80   Pulse 79   Temp 36.7 °C (98 °F) (Temporal)   Ht 1.778 m (5' 10\")   Wt 92.4 kg (203 lb 11.3 oz)   SpO2 96% , Body mass index is 29.23 kg/m².      Physical Exam  Constitutional:       Appearance: Normal appearance. He is well-developed and well-groomed.   HENT:      Head: Normocephalic and atraumatic.      Right Ear: External ear normal.      Left Ear: External ear normal.   Eyes:      General:         Right eye: No discharge.         Left eye: No discharge.      Conjunctiva/sclera: Conjunctivae normal.   Cardiovascular:      Rate and Rhythm: Normal rate.   Pulmonary: "      Effort: Pulmonary effort is normal. No respiratory distress.   Musculoskeletal:      Cervical back: Neck supple.   Skin:     Findings: No rash.   Neurological:      Mental Status: He is alert.   Psychiatric:         Mood and Affect: Mood and affect normal.         Behavior: Behavior normal.            I reviewed with patient lab results resulted on 3/6/2025.        Please note that this dictation was created using voice recognition software. I have made every reasonable attempt to correct obvious errors, but I expect that there are errors of grammar and possibly content that I did not discover before finalizing the note.

## 2025-03-20 ENCOUNTER — OFFICE VISIT (OUTPATIENT)
Dept: DERMATOLOGY | Facility: IMAGING CENTER | Age: 70
End: 2025-03-20
Payer: MEDICARE

## 2025-03-20 DIAGNOSIS — D22.9 MULTIPLE NEVI: ICD-10-CM

## 2025-03-20 DIAGNOSIS — L82.1 STUCCO KERATOSES: ICD-10-CM

## 2025-03-20 DIAGNOSIS — Z12.83 SKIN CANCER SCREENING: ICD-10-CM

## 2025-03-20 DIAGNOSIS — L57.0 ACTINIC KERATOSIS: ICD-10-CM

## 2025-03-20 DIAGNOSIS — L73.8 SEBACEOUS HYPERPLASIA: ICD-10-CM

## 2025-03-20 DIAGNOSIS — C82.60 CUTANEOUS FOLLICLE CENTER LYMPHOMA, UNSPECIFIED BODY REGION (HCC): ICD-10-CM

## 2025-03-20 DIAGNOSIS — D18.01 CHERRY ANGIOMA: ICD-10-CM

## 2025-03-20 DIAGNOSIS — L82.1 SK (SEBORRHEIC KERATOSIS): ICD-10-CM

## 2025-03-20 DIAGNOSIS — L81.4 LENTIGINES: ICD-10-CM

## 2025-03-20 PROCEDURE — 17003 DESTRUCT PREMALG LES 2-14: CPT | Performed by: NURSE PRACTITIONER

## 2025-03-20 PROCEDURE — 99213 OFFICE O/P EST LOW 20 MIN: CPT | Mod: 25 | Performed by: NURSE PRACTITIONER

## 2025-03-20 PROCEDURE — 17000 DESTRUCT PREMALG LESION: CPT | Performed by: NURSE PRACTITIONER

## 2025-03-20 NOTE — PROGRESS NOTES
DERMATOLOGY NOTE  FOLLOW UP VISIT       Chief complaint: Establish Care ([place])    Denies new, growing, changing, itching or bleeding skin lesions today.       Follicle B cell lymphoma found on Bx of lesion on L nasal sidewall, followed by oncology, treated with radiation  History of skin cancer: Yes, Details: BCC back, chest  and BCC rt ear, 04/2021 MO[facility]    History of precancers/actinic keratoses: Yes, Details: face chest arms   History of biopsies:Yes, Details: teen  History of blistering/severe sunburns:Yes, Details: lots of sun exporsure   Family history of skin cancer:No  Family history of atypical moles:No    No Known Allergies     MEDICATIONS:  Medications relevant to specialty reviewed.     REVIEW OF SYSTEMS:   Positive for skin (see HPI)  Negative for fevers and chills    EXAM:  There were no vitals taken for this visit.  Constitutional: Well-developed, well-nourished, and in no distress.     A total body skin exam was performed excluding the genitals per patient preference and including the following areas: head (including face), neck, chest, abdomen, groin/buttocks, back, bilateral upper extremities, and bilateral lower extremities with the following pertinent findings listed below and/or in assessment/plan.    -sun exposed skin of trunk and b/l upper, lower extremities and face with scattered clinically benign light brown reticulated macules all of which were morphologically similar and none of which were suspicious for skin cancer today on exam  -Several scattered 1-3mm bright red macules and thin papules on the trunk and extremities  -Several medium and dark brown stuck-on waxy papules scattered on the trunk and extremities  -Multiple tan medium and dark brown skin-colored macules papules scattered over the trunk >> extremities-All with benign-appearing pigment network patterns on dermoscopy  -Scattered SH on face   -Stucco Keratosis Bilateral Lower Ext.   Ill-defined erythematous gritty/scaly papules  over the forehead nose        IMPRESSION / PLAN:      1. Lentigines  - Benign-appearing nature of lesions discussed during exam.   - Advised to continue to monitor for any return to clinic for new or concerning changes.      2. Cherry angioma  - Benign-appearing nature of lesions discussed during exam.   - Advised to continue to monitor for any return to clinic for new or concerning changes.      3. SK (seborrheic keratosis)  - Benign-appearing nature of lesions discussed during exam.   - Advised to continue to monitor for any return to clinic for new or concerning changes.      4. Sebaceous hyperplasia  - Benign-appearing nature of lesions discussed during exam.   - Advised to continue to monitor for any return to clinic for new or concerning changes.      5. Stucco keratoses  - Benign-appearing nature of lesions discussed during exam.   - Advised to continue to monitor for any return to clinic for new or concerning changes.      6. Multiple nevi  - Benign-appearing nature of lesions discussed during exam.   - Advised to continue to monitor for any return to clinic for new or concerning changes.      7. Cutaneous follicle center lymphoma, unspecified body region (HCC)  Followed by oncology      8. Actinic keratosis  CRYOTHERAPY:  Risks (including, but not limited to: skin discoloration, redness, blister, blood blister, recurrence, need for further treatment, infection, scar) and benefits of cryotherapy discussed. Patient verbally agreed to proceed with treatment. 1 cryotherapy freeze thaw cycles of 10 seconds were applied to 4 lesions on face as noted on exam with cryac. Patient tolerated procedure well. Aftercare instructions given--no specific care needed unless irritated during healing process, can apply Vaseline with small band-aid if needed.      9. Skin cancer screening  Skin cancer education  discussed importance of sun protective clothing, eyewear in addition to the use of broad spectrum sunscreen with SPF  30 or greater, as well as need for reapplication ~every 2 hours when exposed to UVR/handout previously given  discussed importance following up for any new or changing lesions as noted in handout given, but every 6 months exams in clinic in the setting of dermatologic history  ABCDE's of melanoma discussed/handout previously given          Pt understands risks, benefits, alternative treatments associated with LN2,  Patient verbalized understanding and agrees with plan regarding the above            I have performed a physical exam and reviewed and updated ROS and Plan today (3/20/2025). In review of dermatology visit (9/5/2024), there are no changes except as documented above.     Please note that this dictation was created using voice recognition software. I have made every reasonable attempt to correct obvious errors, but I expect that there are errors of grammar and possibly content that I did not discover before finalizing the note.      Return to clinic in: No follow-ups on file. and as needed for any new or changing skin lesions.

## 2025-03-28 PROCEDURE — RXMED WILLOW AMBULATORY MEDICATION CHARGE

## 2025-03-28 PROCEDURE — RXMED WILLOW AMBULATORY MEDICATION CHARGE: Performed by: FAMILY MEDICINE

## 2025-04-01 ENCOUNTER — TELEPHONE (OUTPATIENT)
Dept: HEMATOLOGY ONCOLOGY | Facility: MEDICAL CENTER | Age: 70
End: 2025-04-01
Payer: MEDICARE

## 2025-04-01 ENCOUNTER — PHARMACY VISIT (OUTPATIENT)
Dept: PHARMACY | Facility: MEDICAL CENTER | Age: 70
End: 2025-04-01
Payer: COMMERCIAL

## 2025-04-01 DIAGNOSIS — D47.2 IGM LAMBDA MONOCLONAL GAMMOPATHY: ICD-10-CM

## 2025-04-01 NOTE — TELEPHONE ENCOUNTER
Patient originally seen back on 11/4/2024 after referral from PCP for MGUS.     Patient with multiple things going on at this time and is currently under workup for possible ALS or myasthenia gravis with neurology.  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  He has been seen by radiation oncology and treatment is deferred at this time until patient completes full workup for his neurologic issues.  Patient also with a parotid mass that he has been seen by ENT and is status post FNA biopsy in July 2024 and found to be benign.  However PET/CT that was completed after his lymphoma diagnosis did show a right parotid nodule that was hypermetabolic.  Confirmed with patient and wife today that he does have a follow-up visit with Dr. Bernal, ENT this coming Monday, 11/18/2024.  Patient also with a lung nodule on CT scan that was stable at 7 mm and is monitored by PCP.     Also patient did undergo labs with an SPEP on 8/22/2024 which did show an M spike at 0.35.  Immunofixation was positive for IgM type lambda monoclonal protein.  IgA was within normal limits, IgM was slightly elevated at 466, IgG was slightly low at 643.  He is mildly anemic with a hemoglobin of 13.8, no evidence of kidney dysfunction or hypercalcemia.  I did recommend further labs including light chains, UPEP and a beta-2 microglobulin and viscosity..     Kappa light chain was elevated but the light chain ratio was normal at 1.39.  Beta-2 microglobulin level and viscosity were within normal limits.  UPEP showed no monoclonal proteins with urine immunofixation being negative for any monoclonal free light chains.     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  Labs do not warrant proceeding with bone marrow biopsy at this time.  Patient is currently being worked up for neurologic issues with his neurologist and  neurosurgeon, and he is also being seen in follow-up for the parotid mass with ENT.  It is reasonable to hold off and continue to monitor labs with repeat labs again in 3 months.    Interval history  Repeat labs completed on 3/6/2025 do show anemia however when looking back at the labs over the last 3 months his hemoglobin has been waxing and waning and therefore stable.  Chemistry panel shows no evidence of hypercalcemia or kidney dysfunction.  Immunoglobulins continue to show a mildly elevated IgM which is stable, and a low IgG.  He does continue to have an M spike now at 0.44 and light chain ratio was within normal limits at 1.07.    At this time I would continue to monitor patient and recommend repeating labs in 6 months, or sooner if needed.  Spoke to the patient on the phone with regards to the plan of care and he did verbalize understanding's and agreement the plan.       Latest Reference Range & Units 03/06/25 10:56   Immunoglobulin A 68 - 408 mg/dL 185   Immunoglobulin G 768 - 1632 mg/dL 575 (L)   Immunoglobulin M 35 - 263 mg/dL 465 (H)   Interpretation  See Note   Albumin 3.75 - 5.01 g/dL 3.88   Gamma Globulin 0.62 - 1.51 g/dL 0.86   Alpha-1 Globulin 0.19 - 0.46 g/dL 0.26   Alpha-2 Globulin 0.48 - 1.05 g/dL 0.99   Beta Globulin 0.48 - 1.10 g/dL 0.82   Free Kappa Light Chains 3.30 - 19.40 mg/L 25.69 (H)   Free Lambda Light Chains 5.71 - 26.30 mg/L 24.05   Kappa-Lambda Ratio 0.26 - 1.65  1.07   Immunofixation  RAYMON Done   Monoclonal Protein <=0.00 g/dL 0.44 (H)        Latest Reference Range & Units 03/06/25 10:56   WBC 4.8 - 10.8 K/uL 6.5   RBC 4.70 - 6.10 M/uL 5.81   Hemoglobin 14.0 - 18.0 g/dL 11.1 (L)   Hematocrit 42.0 - 52.0 % 40.1 (L)   MCV 81.4 - 97.8 fL 69.0 (L)   MCH 27.0 - 33.0 pg 19.1 (L)   MCHC 32.3 - 36.5 g/dL 27.7 (L)   RDW 35.9 - 50.0 fL 49.7   Platelet Count 164 - 446 K/uL 322   MPV 9.0 - 12.9 fL 9.5   Neutrophils-Polys 44.00 - 72.00 % 66.20   Neutrophils (Absolute) 1.82 - 7.42 K/uL 4.27    Lymphocytes 22.00 - 41.00 % 20.60 (L)   Lymphs (Absolute) 1.00 - 4.80 K/uL 1.33   Monocytes 0.00 - 13.40 % 9.00   Monos (Absolute) 0.00 - 0.85 K/uL 0.58   Eosinophils 0.00 - 6.90 % 2.50   Eos (Absolute) 0.00 - 0.51 K/uL 0.16   Basophils 0.00 - 1.80 % 1.20   Baso (Absolute) 0.00 - 0.12 K/uL 0.08   Immature Granulocytes 0.00 - 0.90 % 0.50   Immature Granulocytes (abs) 0.00 - 0.11 K/uL 0.03   Nucleated RBC 0.00 - 0.20 /100 WBC 0.00   NRBC (Absolute) K/uL 0.00   Plt Estimation  Normal   RBC Morphology  Present   Hypochromia  1+   Anisocytosis  1+   Microcytosis  3+ !   Comments-Diff  see below   Peripheral Smear Review  see below   Sodium 135 - 145 mmol/L 138   Potassium 3.6 - 5.5 mmol/L 4.4   Chloride 96 - 112 mmol/L 104   Co2 20 - 33 mmol/L 21   Anion Gap 7.0 - 16.0  13.0   Glucose 65 - 99 mg/dL 115 (H)   Bun 8 - 22 mg/dL 17   Creatinine 0.50 - 1.40 mg/dL 1.02   GFR (CKD-EPI) >60 mL/min/1.73 m 2 78   Calcium 8.5 - 10.5 mg/dL 9.3   Correct Calcium 8.5 - 10.5 mg/dL 9.1   AST(SGOT) 12 - 45 U/L 30   ALT(SGPT) 2 - 50 U/L 52 (H)   Alkaline Phosphatase 30 - 99 U/L 80   Total Bilirubin 0.1 - 1.5 mg/dL 0.3   Albumin 3.2 - 4.9 g/dL 4.2   Total Protein 6.0 - 8.2 g/dL 7.0   Globulin 1.9 - 3.5 g/dL 2.8   A-G Ratio g/dL 1.5

## 2025-04-04 ENCOUNTER — HOSPITAL ENCOUNTER (OUTPATIENT)
Dept: RADIATION ONCOLOGY | Facility: MEDICAL CENTER | Age: 70
End: 2025-04-04
Attending: RADIOLOGY
Payer: MEDICARE

## 2025-04-24 PROCEDURE — RXMED WILLOW AMBULATORY MEDICATION CHARGE: Performed by: FAMILY MEDICINE

## 2025-05-02 ENCOUNTER — PHARMACY VISIT (OUTPATIENT)
Dept: PHARMACY | Facility: MEDICAL CENTER | Age: 70
End: 2025-05-02
Payer: COMMERCIAL

## 2025-05-15 PROCEDURE — RXMED WILLOW AMBULATORY MEDICATION CHARGE: Performed by: FAMILY MEDICINE

## 2025-05-16 ENCOUNTER — PHARMACY VISIT (OUTPATIENT)
Dept: PHARMACY | Facility: MEDICAL CENTER | Age: 70
End: 2025-05-16
Payer: COMMERCIAL

## 2025-06-02 ENCOUNTER — HOSPITAL ENCOUNTER (OUTPATIENT)
Dept: LAB | Facility: MEDICAL CENTER | Age: 70
End: 2025-06-02
Payer: MEDICARE

## 2025-06-02 PROCEDURE — 36415 COLL VENOUS BLD VENIPUNCTURE: CPT

## 2025-06-02 PROCEDURE — 82306 VITAMIN D 25 HYDROXY: CPT

## 2025-06-03 LAB — 25(OH)D3 SERPL-MCNC: 68 NG/ML (ref 30–100)

## 2025-06-19 ENCOUNTER — HOSPITAL ENCOUNTER (OUTPATIENT)
Dept: LAB | Facility: MEDICAL CENTER | Age: 70
End: 2025-06-19
Attending: FAMILY MEDICINE
Payer: MEDICARE

## 2025-06-19 ENCOUNTER — RESULTS FOLLOW-UP (OUTPATIENT)
Dept: MEDICAL GROUP | Facility: MEDICAL CENTER | Age: 70
End: 2025-06-19

## 2025-06-19 DIAGNOSIS — E11.29 TYPE 2 DIABETES MELLITUS WITH MICROALBUMINURIA, WITHOUT LONG-TERM CURRENT USE OF INSULIN (HCC): ICD-10-CM

## 2025-06-19 DIAGNOSIS — R80.9 TYPE 2 DIABETES MELLITUS WITH MICROALBUMINURIA, WITHOUT LONG-TERM CURRENT USE OF INSULIN (HCC): ICD-10-CM

## 2025-06-19 DIAGNOSIS — R74.01 ELEVATED ALT MEASUREMENT: ICD-10-CM

## 2025-06-19 LAB
ALBUMIN SERPL BCP-MCNC: 4.3 G/DL (ref 3.2–4.9)
ALBUMIN/GLOB SERPL: 1.6 G/DL
ALP SERPL-CCNC: 76 U/L (ref 30–99)
ALT SERPL-CCNC: 51 U/L (ref 2–50)
ANION GAP SERPL CALC-SCNC: 12 MMOL/L (ref 7–16)
AST SERPL-CCNC: 28 U/L (ref 12–45)
BILIRUB SERPL-MCNC: 0.3 MG/DL (ref 0.1–1.5)
BUN SERPL-MCNC: 20 MG/DL (ref 8–22)
CALCIUM ALBUM COR SERPL-MCNC: 9.2 MG/DL (ref 8.5–10.5)
CALCIUM SERPL-MCNC: 9.4 MG/DL (ref 8.5–10.5)
CHLORIDE SERPL-SCNC: 107 MMOL/L (ref 96–112)
CHOLEST SERPL-MCNC: 90 MG/DL (ref 100–199)
CO2 SERPL-SCNC: 21 MMOL/L (ref 20–33)
CREAT SERPL-MCNC: 1.16 MG/DL (ref 0.5–1.4)
EST. AVERAGE GLUCOSE BLD GHB EST-MCNC: 143 MG/DL
FASTING STATUS PATIENT QL REPORTED: NORMAL
GFR SERPLBLD CREATININE-BSD FMLA CKD-EPI: 67 ML/MIN/1.73 M 2
GLOBULIN SER CALC-MCNC: 2.7 G/DL (ref 1.9–3.5)
GLUCOSE SERPL-MCNC: 125 MG/DL (ref 65–99)
HBA1C MFR BLD: 6.6 % (ref 4–5.6)
HDLC SERPL-MCNC: 26 MG/DL
LDLC SERPL CALC-MCNC: 34 MG/DL
POTASSIUM SERPL-SCNC: 4.8 MMOL/L (ref 3.6–5.5)
PROT SERPL-MCNC: 7 G/DL (ref 6–8.2)
SODIUM SERPL-SCNC: 140 MMOL/L (ref 135–145)
TRIGL SERPL-MCNC: 150 MG/DL (ref 0–149)
VIT B12 SERPL-MCNC: 715 PG/ML (ref 211–911)

## 2025-06-19 PROCEDURE — 80053 COMPREHEN METABOLIC PANEL: CPT

## 2025-06-19 PROCEDURE — 80061 LIPID PANEL: CPT

## 2025-06-19 PROCEDURE — 82607 VITAMIN B-12: CPT

## 2025-06-19 PROCEDURE — 36415 COLL VENOUS BLD VENIPUNCTURE: CPT

## 2025-06-19 PROCEDURE — 83036 HEMOGLOBIN GLYCOSYLATED A1C: CPT

## 2025-06-23 PROCEDURE — RXMED WILLOW AMBULATORY MEDICATION CHARGE: Performed by: FAMILY MEDICINE

## 2025-06-24 ENCOUNTER — PHARMACY VISIT (OUTPATIENT)
Dept: PHARMACY | Facility: MEDICAL CENTER | Age: 70
End: 2025-06-24
Payer: COMMERCIAL

## 2025-06-26 ENCOUNTER — TELEPHONE (OUTPATIENT)
Dept: HEALTH INFORMATION MANAGEMENT | Facility: OTHER | Age: 70
End: 2025-06-26
Payer: MEDICARE

## 2025-07-07 PROCEDURE — RXMED WILLOW AMBULATORY MEDICATION CHARGE: Performed by: FAMILY MEDICINE

## 2025-07-09 ENCOUNTER — PHARMACY VISIT (OUTPATIENT)
Dept: PHARMACY | Facility: MEDICAL CENTER | Age: 70
End: 2025-07-09
Payer: COMMERCIAL

## 2025-07-14 SDOH — ECONOMIC STABILITY: FOOD INSECURITY: WITHIN THE PAST 12 MONTHS, THE FOOD YOU BOUGHT JUST DIDN'T LAST AND YOU DIDN'T HAVE MONEY TO GET MORE.: NEVER TRUE

## 2025-07-14 SDOH — HEALTH STABILITY: PHYSICAL HEALTH: ON AVERAGE, HOW MANY DAYS PER WEEK DO YOU ENGAGE IN MODERATE TO STRENUOUS EXERCISE (LIKE A BRISK WALK)?: 3 DAYS

## 2025-07-14 SDOH — HEALTH STABILITY: PHYSICAL HEALTH: ON AVERAGE, HOW MANY MINUTES DO YOU ENGAGE IN EXERCISE AT THIS LEVEL?: 30 MIN

## 2025-07-14 SDOH — ECONOMIC STABILITY: FOOD INSECURITY: WITHIN THE PAST 12 MONTHS, YOU WORRIED THAT YOUR FOOD WOULD RUN OUT BEFORE YOU GOT MONEY TO BUY MORE.: NEVER TRUE

## 2025-07-14 SDOH — ECONOMIC STABILITY: INCOME INSECURITY: HOW HARD IS IT FOR YOU TO PAY FOR THE VERY BASICS LIKE FOOD, HOUSING, MEDICAL CARE, AND HEATING?: NOT HARD AT ALL

## 2025-07-14 SDOH — ECONOMIC STABILITY: INCOME INSECURITY: IN THE LAST 12 MONTHS, WAS THERE A TIME WHEN YOU WERE NOT ABLE TO PAY THE MORTGAGE OR RENT ON TIME?: NO

## 2025-07-14 SDOH — HEALTH STABILITY: MENTAL HEALTH
STRESS IS WHEN SOMEONE FEELS TENSE, NERVOUS, ANXIOUS, OR CAN'T SLEEP AT NIGHT BECAUSE THEIR MIND IS TROUBLED. HOW STRESSED ARE YOU?: ONLY A LITTLE

## 2025-07-14 ASSESSMENT — SOCIAL DETERMINANTS OF HEALTH (SDOH)
HOW OFTEN DO YOU ATTENT MEETINGS OF THE CLUB OR ORGANIZATION YOU BELONG TO?: NEVER
HOW OFTEN DO YOU GET TOGETHER WITH FRIENDS OR RELATIVES?: ONCE A WEEK
HOW MANY DRINKS CONTAINING ALCOHOL DO YOU HAVE ON A TYPICAL DAY WHEN YOU ARE DRINKING: 1 OR 2
IN A TYPICAL WEEK, HOW MANY TIMES DO YOU TALK ON THE PHONE WITH FAMILY, FRIENDS, OR NEIGHBORS?: MORE THAN THREE TIMES A WEEK
HOW OFTEN DO YOU HAVE A DRINK CONTAINING ALCOHOL: MONTHLY OR LESS
WITHIN THE PAST 12 MONTHS, YOU WORRIED THAT YOUR FOOD WOULD RUN OUT BEFORE YOU GOT THE MONEY TO BUY MORE: NEVER TRUE
IN THE PAST 12 MONTHS, HAS THE ELECTRIC, GAS, OIL, OR WATER COMPANY THREATENED TO SHUT OFF SERVICE IN YOUR HOME?: NO
DO YOU BELONG TO ANY CLUBS OR ORGANIZATIONS SUCH AS CHURCH GROUPS UNIONS, FRATERNAL OR ATHLETIC GROUPS, OR SCHOOL GROUPS?: NO
IN A TYPICAL WEEK, HOW MANY TIMES DO YOU TALK ON THE PHONE WITH FAMILY, FRIENDS, OR NEIGHBORS?: MORE THAN THREE TIMES A WEEK
HOW OFTEN DO YOU GET TOGETHER WITH FRIENDS OR RELATIVES?: ONCE A WEEK
HOW OFTEN DO YOU ATTEND CHURCH OR RELIGIOUS SERVICES?: NEVER
HOW OFTEN DO YOU HAVE SIX OR MORE DRINKS ON ONE OCCASION: NEVER
HOW OFTEN DO YOU ATTENT MEETINGS OF THE CLUB OR ORGANIZATION YOU BELONG TO?: NEVER
HOW OFTEN DO YOU ATTEND CHURCH OR RELIGIOUS SERVICES?: NEVER
DO YOU BELONG TO ANY CLUBS OR ORGANIZATIONS SUCH AS CHURCH GROUPS UNIONS, FRATERNAL OR ATHLETIC GROUPS, OR SCHOOL GROUPS?: NO
HOW HARD IS IT FOR YOU TO PAY FOR THE VERY BASICS LIKE FOOD, HOUSING, MEDICAL CARE, AND HEATING?: NOT HARD AT ALL

## 2025-07-14 ASSESSMENT — LIFESTYLE VARIABLES
HOW OFTEN DO YOU HAVE A DRINK CONTAINING ALCOHOL: MONTHLY OR LESS
AUDIT-C TOTAL SCORE: 1
SKIP TO QUESTIONS 9-10: 1
HOW MANY STANDARD DRINKS CONTAINING ALCOHOL DO YOU HAVE ON A TYPICAL DAY: 1 OR 2
HOW OFTEN DO YOU HAVE SIX OR MORE DRINKS ON ONE OCCASION: NEVER

## 2025-07-15 ENCOUNTER — OFFICE VISIT (OUTPATIENT)
Dept: MEDICAL GROUP | Facility: MEDICAL CENTER | Age: 70
End: 2025-07-15
Payer: MEDICARE

## 2025-07-15 VITALS
SYSTOLIC BLOOD PRESSURE: 118 MMHG | HEART RATE: 79 BPM | BODY MASS INDEX: 28.77 KG/M2 | WEIGHT: 205.47 LBS | DIASTOLIC BLOOD PRESSURE: 74 MMHG | OXYGEN SATURATION: 96 % | HEIGHT: 71 IN | TEMPERATURE: 98 F

## 2025-07-15 DIAGNOSIS — D36.9 TUBULAR ADENOMA: ICD-10-CM

## 2025-07-15 DIAGNOSIS — M48.02 CERVICAL SPINAL STENOSIS: ICD-10-CM

## 2025-07-15 DIAGNOSIS — E78.1 HYPERTRIGLYCERIDEMIA: ICD-10-CM

## 2025-07-15 DIAGNOSIS — D50.9 MICROCYTIC ANEMIA: ICD-10-CM

## 2025-07-15 DIAGNOSIS — G47.09 OTHER INSOMNIA: ICD-10-CM

## 2025-07-15 DIAGNOSIS — K76.0 FATTY LIVER: ICD-10-CM

## 2025-07-15 DIAGNOSIS — R91.8 LUNG NODULES: ICD-10-CM

## 2025-07-15 DIAGNOSIS — G47.33 OBSTRUCTIVE SLEEP APNEA SYNDROME: ICD-10-CM

## 2025-07-15 DIAGNOSIS — N20.0 KIDNEY STONE: ICD-10-CM

## 2025-07-15 DIAGNOSIS — Z00.00 MEDICARE ANNUAL WELLNESS VISIT, INITIAL: Primary | ICD-10-CM

## 2025-07-15 DIAGNOSIS — F41.1 GAD (GENERALIZED ANXIETY DISORDER): ICD-10-CM

## 2025-07-15 DIAGNOSIS — H91.93 BILATERAL HEARING LOSS, UNSPECIFIED HEARING LOSS TYPE: ICD-10-CM

## 2025-07-15 DIAGNOSIS — I77.810 ASCENDING AORTA DILATION (HCC): ICD-10-CM

## 2025-07-15 DIAGNOSIS — C44.311 BASAL CELL CARCINOMA (BCC) OF SKIN OF NOSE: ICD-10-CM

## 2025-07-15 DIAGNOSIS — D47.2 IGM LAMBDA MONOCLONAL GAMMOPATHY: ICD-10-CM

## 2025-07-15 DIAGNOSIS — K21.9 GASTROESOPHAGEAL REFLUX DISEASE WITHOUT ESOPHAGITIS: ICD-10-CM

## 2025-07-15 DIAGNOSIS — Z12.5 SCREENING FOR PROSTATE CANCER: ICD-10-CM

## 2025-07-15 DIAGNOSIS — R74.01 ELEVATED ALT MEASUREMENT: ICD-10-CM

## 2025-07-15 DIAGNOSIS — C82.61 CUTANEOUS FOLLICLE CENTER LYMPHOMA INVOLVING LYMPH NODES OF HEAD (HCC): ICD-10-CM

## 2025-07-15 DIAGNOSIS — R80.9 TYPE 2 DIABETES MELLITUS WITH MICROALBUMINURIA, WITHOUT LONG-TERM CURRENT USE OF INSULIN (HCC): ICD-10-CM

## 2025-07-15 DIAGNOSIS — I10 ESSENTIAL HYPERTENSION: ICD-10-CM

## 2025-07-15 DIAGNOSIS — K57.30 COLON, DIVERTICULOSIS: ICD-10-CM

## 2025-07-15 DIAGNOSIS — K80.20 CALCULUS OF GALLBLADDER WITHOUT CHOLECYSTITIS WITHOUT OBSTRUCTION: ICD-10-CM

## 2025-07-15 DIAGNOSIS — E11.29 TYPE 2 DIABETES MELLITUS WITH MICROALBUMINURIA, WITHOUT LONG-TERM CURRENT USE OF INSULIN (HCC): ICD-10-CM

## 2025-07-15 DIAGNOSIS — F32.0 CURRENT MILD EPISODE OF MAJOR DEPRESSIVE DISORDER WITHOUT PRIOR EPISODE (HCC): ICD-10-CM

## 2025-07-15 DIAGNOSIS — K11.8 PAROTID MASS: ICD-10-CM

## 2025-07-15 DIAGNOSIS — N52.8 OTHER MALE ERECTILE DYSFUNCTION: ICD-10-CM

## 2025-07-15 PROBLEM — R09.82 POSTNASAL DRIP: Status: RESOLVED | Noted: 2024-01-17 | Resolved: 2025-07-15

## 2025-07-15 PROBLEM — Z86.0100 HISTORY OF COLONIC POLYPS: Status: RESOLVED | Noted: 2024-11-04 | Resolved: 2025-07-15

## 2025-07-15 PROBLEM — Z98.890 POSTOPERATIVE HYPOXIA: Status: RESOLVED | Noted: 2024-12-18 | Resolved: 2025-07-15

## 2025-07-15 PROBLEM — R09.02 POSTOPERATIVE HYPOXIA: Status: RESOLVED | Noted: 2024-12-18 | Resolved: 2025-07-15

## 2025-07-15 PROBLEM — E66.811 OBESITY (BMI 30.0-34.9): Status: RESOLVED | Noted: 2024-01-17 | Resolved: 2025-07-15

## 2025-07-15 PROBLEM — R94.31 QT PROLONGATION: Status: RESOLVED | Noted: 2024-12-03 | Resolved: 2025-07-15

## 2025-07-15 PROBLEM — H81.13 BENIGN PAROXYSMAL POSITIONAL VERTIGO DUE TO BILATERAL VESTIBULAR DISORDER: Status: RESOLVED | Noted: 2022-12-13 | Resolved: 2025-07-15

## 2025-07-15 PROBLEM — D72.829 LEUCOCYTOSIS: Status: RESOLVED | Noted: 2024-12-19 | Resolved: 2025-07-15

## 2025-07-15 PROBLEM — C82.60 CUTANEOUS FOLLICLE CENTER LYMPHOMA (HCC): Status: RESOLVED | Noted: 2024-10-31 | Resolved: 2025-07-15

## 2025-07-15 PROCEDURE — 3078F DIAST BP <80 MM HG: CPT | Performed by: FAMILY MEDICINE

## 2025-07-15 PROCEDURE — 3074F SYST BP LT 130 MM HG: CPT | Performed by: FAMILY MEDICINE

## 2025-07-15 PROCEDURE — 99214 OFFICE O/P EST MOD 30 MIN: CPT | Mod: 25 | Performed by: FAMILY MEDICINE

## 2025-07-15 PROCEDURE — RXMED WILLOW AMBULATORY MEDICATION CHARGE: Performed by: FAMILY MEDICINE

## 2025-07-15 PROCEDURE — G0438 PPPS, INITIAL VISIT: HCPCS | Performed by: FAMILY MEDICINE

## 2025-07-15 RX ORDER — TRAZODONE HYDROCHLORIDE 50 MG/1
50 TABLET ORAL NIGHTLY
Qty: 100 TABLET | Refills: 3 | Status: SHIPPED | OUTPATIENT
Start: 2025-07-15 | End: 2025-07-22

## 2025-07-15 RX ORDER — CHLORAL HYDRATE 500 MG
1000 CAPSULE ORAL
COMMUNITY

## 2025-07-15 ASSESSMENT — ENCOUNTER SYMPTOMS
DEPRESSION: 1
FEVER: 0
PALPITATIONS: 0
CHILLS: 0
GENERAL WELL-BEING: FAIR
INSOMNIA: 1

## 2025-07-15 ASSESSMENT — FIBROSIS 4 INDEX: FIB4 SCORE: 0.88

## 2025-07-15 ASSESSMENT — PATIENT HEALTH QUESTIONNAIRE - PHQ9
CLINICAL INTERPRETATION OF PHQ2 SCORE: 6
SUM OF ALL RESPONSES TO PHQ QUESTIONS 1-9: 12
5. POOR APPETITE OR OVEREATING: 0 - NOT AT ALL

## 2025-07-15 ASSESSMENT — ACTIVITIES OF DAILY LIVING (ADL): BATHING_REQUIRES_ASSISTANCE: 0

## 2025-07-15 NOTE — ASSESSMENT & PLAN NOTE
Chronic condition, improving, recommend patient to not take Tylenol PM and Excedrin.  Recheck labs in 4-month

## 2025-07-15 NOTE — ASSESSMENT & PLAN NOTE
Chronic condition, unstable, start trazodone 50 mg at bedtime.  Discussed highest dose we can go up to 200 mg.  He is on Zoloft 50 mg daily.  Recommended to watch for serotonin reuptake syndrome

## 2025-07-15 NOTE — PROGRESS NOTES
Chief Complaint   Patient presents with    Annual Exam     Discuss labs       HPI:  Raf Spear is a 70 y.o. here for Medicare Annual Wellness Visit     Patient Active Problem List    Diagnosis Date Noted    Current mild episode of major depressive disorder without prior episode (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) 01/13/2025    IgM lambda monoclonal gammopathy 11/14/2024    Cervical spinal stenosis 11/14/2024    Kidney stone 03/21/2024    Parotid mass 03/21/2024    Ascending aorta dilation ([facility]) 01/17/2024    Other male erectile dysfunction 02/17/2023    Fatty liver 10/17/2022    Other insomnia 10/17/2022    BCC (basal cell carcinoma of skin) 07/19/2022    Bilateral hearing loss 07/19/2022    Microcytic anemia 07/19/2022    Essential hypertension 04/06/2022    Type 2 diabetes mellitus with microalbuminuria, without long-term current use of insulin ([facility]) 04/06/2022    Gastroesophageal reflux disease without esophagitis 04/06/2022    CECILIA (generalized anxiety disorder) 04/06/2022    Tubular adenoma 04/06/2022    Lung nodules 04/06/2022    Obstructive sleep apnea syndrome 04/06/2022    Colon, diverticulosis 02/15/2018    Calculus of gallbladder without cholecystitis without obstruction 11/30/2017         Current Outpatient Medications:     fish oil, 1,000 mg, Oral, TID WITH MEALS, Taking    Ascorbic Acid (VITAMIN C PO), Take  by mouth., Taking    Cholecalciferol (VITAMIN D-3 PO), Take  by mouth., Taking    Misc Natural Products (GLUCOSAMINE CHONDROITIN ADV PO), Take  by mouth., Taking    traZODone, 50 mg, Oral, Nightly, Taking    amLODIPine, 5 mg, Oral, DAILY, Taking    amLODIPine, 2.5 mg, Oral, DAILY, Taking    metoprolol SR, 50 mg, Oral, DAILY, Taking    acetaminophen, 500-1,000 mg, Oral, Q6HRS PRN, Taking As Needed    sertraline, 50 mg, Oral, QDAY, Taking    metFORMIN, 500 mg, Oral,  BID WITH MEALS, Taking    Jardiance, 1 Tablet, Oral, DAILY, Taking    valsartan, 320 mg, Oral, DAILY, Taking    omeprazole, Take 1 capsule by mouth twice a day 30 min before meals, Taking   Current supplements as per medication list.     Allergies: Patient has no known allergies.    Current social contact/activities: Spends time with family and friends    He  reports that he has been smoking cigarettes. He started smoking about 49 years ago. He has a 14.8 pack-year smoking history. He has never used smokeless tobacco. He reports that he does not currently use alcohol after a past usage of about 1.2 oz of alcohol per week. He reports that he does not use drugs.  Ready to quit: Not Answered  Counseling given: Not Answered  Tobacco comments: Have quit and restarted several times.  Most recently about a month ago      ROS:    Gait: Uses no assistive device  Ostomy: No  Other tubes: No  Amputations: No  Chronic oxygen use: No  Last eye exam: 03/2025  Wears hearing aids: Yes   : Denies any urinary leakage during the last 6 months    Screening:    Depression Screening  Little interest or pleasure in doing things?  3 - nearly every day  Feeling down, depressed , or hopeless? 3 - nearly every day  Trouble falling or staying asleep, or sleeping too much?  3 - nearly every day  Feeling tired or having little energy?  3 - nearly every day  Poor appetite or overeating?  0 - not at all  Feeling bad about yourself - or that you are a failure or have let yourself or your family down? 0 - not at all  Trouble concentrating on things, such as reading the newspaper or watching television? 0 - not at all  Moving or speaking so slowly that other people could have noticed.  Or the opposite - being so fidgety or restless that you have been moving around a lot more than usual?  0 - not at all  Thoughts that you would be better off dead, or of hurting yourself?  0 - not at all  Patient Health Questionnaire Score: 12    If depressive  symptoms identified deferred to follow up visit unless specifically addressed in assessment and plan.    Interpretation of PHQ-9 Total Score   Score Severity   1-4 No Depression   5-9 Mild Depression   10-14 Moderate Depression   15-19 Moderately Severe Depression   20-27 Severe Depression    Screening for Cognitive Impairment  Do you or any of your friends or family members have any concern about your memory? No  Three Minute Recall (Village, Kitchen, Baby) 3/3    Eddie clock face with all 12 numbers and set the hands to show 10 minutes past 11.  No    Cognitive concerns identified deferred for follow up unless specifically addressed in assessment and plan.    Fall Risk Assessment  Has the patient had two or more falls in the last year or any fall with injury in the last year?  No    Safety Assessment  Do you always wear your seatbelt?  Yes  Any changes to home needed to function safely? No  Difficulty hearing.  Yes  Patient counseled about all safety risks that were identified.    Functional Assessment ADLs  Are there any barriers preventing you from cooking for yourself or meeting nutritional needs?  No.    Are there any barriers preventing you from driving safely or obtaining transportation?  No.    Are there any barriers preventing you from using a telephone or calling for help?  No    Are there any barriers preventing you from shopping?  No.    Are there any barriers preventing you from taking care of your own finances?  Yes    Are there any barriers preventing you from managing your medications?  No    Are there any barriers preventing you from showering, bathing or dressing yourself? No    Are there any barriers preventing you from doing housework or laundry? No    Are there any barriers preventing you from using the toilet?No    Are you currently engaging in any exercise or physical activity?  Yes. Physical therapy twice a week, housework, yardwork    Self-Assessment of Health  What is your perception of  your health? Fair    Do you sleep more than six hours a night? Yes    In the past 7 days, how much did pain keep you from doing your normal work? None    Do you spend quality time with family or friends (virtually or in person)? Yes    Do you usually eat a heart healthy diet that constists of a variety of fruits, vegetables, whole grains and fiber? No    Do you eat foods high in fat and/or Fast Food more than three times per week? Yes    How concerned are you that your medical conditions are not being well managed? Not at all    Are you worried that in the next 2 months, you may not have stable housing that you own, rent, or stay in as part of a household? No        Advance Care Planning  Do you have an Advance Directive, Living Will, Durable Power of , or POLST? Yes  Advance Directive Living Will Durable Power of    is on file      Health Maintenance Summary            Current Care Gaps       COVID-19 Vaccine (1) Never done     No completion history exists for this topic.                      Needs Review       Abdominal Aortic Aneurysm (AAA) Screening  Tentatively Complete      08/24/2022  US-ABDOMINAL AORTA W/O DOPPLER    07/02/2015  US-ABDOMEN COMPLETE SURVEY              Hepatitis C Screening  Tentatively Complete      06/28/2022  Hepatitis C Antibody component of HEP C VIRUS ANTIBODY    09/18/2015  Outside Procedure: HEPATITIS PANEL ACUTE (4 COMPONENTS)              Diabetes: Monofilament / LE Exam (Yearly) Tentatively due on 8/22/2025 08/22/2024  Diabetic Monofilament Lower Extremity Exam    09/05/2023  [employer]: WORKFLOW - DIABETES - DIABETIC FOOT EXAM PERFORMED    09/05/2023  Diabetic Monofilament Lower Extremity Exam    04/06/2022  [employer]: WORKFLOW - DIABETES - DIABETIC FOOT EXAM PERFORMED              Colorectal Cancer Screening (Colonoscopy - Every 3 Years) Tentatively due on 5/22/2028 05/22/2025  COLONOSCOPY RESULTS    03/25/2022  Colonoscopy (Reason not specified -  Colonoscopy records in media section)                      Awaiting Completion       A1c Screening (Every 6 Months) Order placed this encounter      07/15/2025  Order placed for HEMOGLOBIN A1C by Ryan Carranza M.D.    06/19/2025  HEMOGLOBIN A1C    03/06/2025  HEMOGLOBIN A1C    12/03/2024  POCT  A1C    08/22/2024  HEMOGLOBIN A1C     Only the first 5 history entries have been loaded, but more history exists.            Fasting Lipid Profile (Yearly) Order placed this encounter      07/15/2025  Order placed for Lipid Profile by Ryan Carranza M.D.    06/19/2025  Lipid Profile    03/06/2025  Lipid Profile    08/22/2024  Lipid Profile    08/22/2024  Lipid Profile      Only the first 5 history entries have been loaded, but more history exists.              SERUM CREATININE (Yearly) Order placed this encounter      07/15/2025  Order placed for Comp Metabolic Panel by Ryan Carranza M.D.    06/19/2025  Comp Metabolic Panel    03/06/2025  Comp Metabolic Panel    03/06/2025  Comp Metabolic Panel    12/19/2024  Basic Metabolic Panel (BMP)      Only the first 5 history entries have been loaded, but more history exists.                      Upcoming       Influenza Vaccine (1) Next due on 9/1/2025 12/18/2024  Imm Admin: Influenza high-dose trivalent (PF)    01/17/2024  Imm Admin: Influenza Vaccine Adult HD    10/31/2022  Imm Admin: Influenza Vaccine Adult HD    10/12/2021  Imm Admin: Influenza Vaccine Adult HD    09/21/2020  Imm Admin: Influenza Vaccine Adult HD      Only the first 5 history entries have been loaded, but more history exists.              Diabetes: Urine Protein Screening (Yearly) Next due on 3/6/2026      03/06/2025  MICROALBUMIN CREAT RATIO URINE    08/22/2024  MICROALBUMIN CREAT RATIO URINE    05/15/2024  MICROALBUMIN CREAT RATIO URINE    08/23/2023  MICROALBUMIN CREAT RATIO URINE    06/28/2022  MICROALBUMIN CREAT RATIO URINE      Only the first 5 history entries have been loaded, but more  history exists.              Diabetes: Retinopathy Screening (Yearly) Next due on 4/9/2026 04/09/2025  RETINAL SCREENING RESULTS    02/15/2024  RETINAL SCREENING RESULTS    01/09/2024  AMB EXTERNAL RETINAL SCREENING RESULTS    08/03/2022  REFERRAL FOR RETINAL SCREENING EXAM              Annual Wellness Visit (Yearly) Next due on 7/15/2026      07/15/2025  Level of Service: [place] INITIAL ANNUAL WELLNESS VISIT-INCLUDES PPPS    07/15/2025  Visit Dx: Medicare annual wellness visit, initial    08/25/2023  Level of Service: [place] ANNUAL WELLNESS VISIT-INCLUDES PPPS SUBSEQUE*    11/10/2022  Level of Service: [place] ANNUAL WELLNESS VISIT-INCLUDES PPPS SUBSEQUE*    06/11/2021        Only the first 5 history entries have been loaded, but more history exists.              IMM DTaP/Tdap/Td Vaccine (4 - Td or Tdap) Next due on 10/20/2033      10/20/2023  Imm Admin: TD Vaccine    09/25/2015  Imm Admin: Tdap Vaccine    11/12/2010  Imm Admin: Tdap Vaccine                      Completed or No Longer Recommended       Zoster (Shingles) Vaccines (Series Information) Completed      10/26/2020  Imm Admin: Zoster Vaccine Recombinant (RZV) (SHINGRIX)    08/24/2020  Imm Admin: Zoster Vaccine Recombinant (RZV) (SHINGRIX)              Hepatitis A Vaccine (Hep A) (Series Information) Aged Out      No completion history exists for this topic.              Hepatitis B Vaccine (Hep B) (Series Information) Aged Out     No completion history exists for this topic.              HPV Vaccines (Series Information) Aged Out     No completion history exists for this topic.              Polio Vaccine (Inactivated Polio) (Series Information) Aged Out     No completion history exists for this topic.              Meningococcal Immunization (Series Information) Aged Out     No completion history exists for this topic.              Meningococcal B Vaccine (Series Information) Aged Out     No completion history exists for this topic.              Pneumococcal  "Vaccine: 50+ Years  Discontinued      06/05/2020  Imm Admin: Pneumococcal polysaccharide vaccine (PPSV-23)    06/01/2020  Imm Admin: Pneumococcal polysaccharide vaccine (PPSV-23)    06/01/2018  Imm Admin: Pneumococcal Conjugate Vaccine (Prevnar/PCV-13)    06/19/2015  Imm Admin: Pneumococcal polysaccharide vaccine (PPSV-23)    06/01/2015  Imm Admin: Pneumococcal Vaccine (PCV7) - HISTORICAL DATA     Only the first 5 history entries have been loaded, but more history exists.                          Patient Care Team:  Ryan Carranza M.D. as PCP - General (Family Medicine)  [facility] (DME Supplier)  Chloé Diamond [facility] as Pharmacist (Pharmacy)  ALBERTA Vasquez (Nurse Practitioner Family)  Kamilah Guido (Neurosurgery)  Migdalia Merritt M.D. (Neurosurgery)  Monica Chauhan M.D. (Radiation Oncology)  Monica Chauhan M.D. as Radiation Oncologist (Radiation Oncology)  [facility] as Home Health Provider  Judy Burton R.N. as Nurse Navigator      Social History[1]  Family History   Problem Relation Age of Onset    Cancer Mother         Lymphoma Leukemia    Hypertension Father     Cancer Father         Colon cancer    Colon Cancer Father     Colorectal Cancer Father         Passed from colon cancer    Cancer Sister         Kidney cancer    Kidney cancer Sister         Diabetic, COPD     He  has a past medical history of Anesthesia, Basal cell carcinoma, Cough, Daytime sleepiness, Diabetes (HCC), Uzbek measles, Hearing difficulty, Hyperlipidemia, Hypertension, Influenza, Morning headache, Mumps, Sleep apnea (12/2020), Snoring, Squamous cell carcinoma in situ (SCCIS) of skin, and Wears glasses.   Past Surgical History[2]    Exam:   /74 (BP Location: Left arm, Patient Position: Sitting, BP Cuff Size: Adult)   Pulse 79   Temp 36.7 °C (98 °F) (Temporal)   Ht 1.797 m (5' 10.75\")   Wt 93.2 kg (205 lb 7.5 oz)   SpO2 96%  Body mass index is 28.86 kg/m².    Hearing fair.    Dentition good  Alert, " oriented in no acute distress.  Eye contact is good, speech goal directed, affect calm    Assessment and Plan. The following treatment and monitoring plan is recommended:    1. Medicare annual wellness visit, initial    2. Type 2 diabetes mellitus with microalbuminuria, without long-term current use of insulin (HCC)  - Comp Metabolic Panel; Future  - HEMOGLOBIN A1C; Future    3. Tubular adenoma    4. Parotid mass    5. Other male erectile dysfunction    6. Other insomnia  - traZODone (DESYREL) 50 MG Tab; Take 1 Tablet by mouth every evening.  Dispense: 100 Tablet; Refill: 3    7. Obstructive sleep apnea syndrome    8. Microcytic anemia  - CBC WITH DIFFERENTIAL; Future    9. Lung nodules  - CT-CHEST (THORAX) WITH; Future    10. Kidney stone    11. IgM lambda monoclonal gammopathy    12. Hypertriglyceridemia  - Lipid Profile; Future    13. Gastroesophageal reflux disease without esophagitis    14. CECILIA (generalized anxiety disorder)    15. Ascending aorta dilation (HCC)  - EC-ECHOCARDIOGRAM COMPLETE W/O CONT; Future    16. Screening for prostate cancer  - PROSTATE SPECIFIC AG SCREENING; Future    17. Current mild episode of major depressive disorder without prior episode (HCC)  - Patient has been identified as having a positive depression screening. Appropriate orders and counseling have been given.    18. Bilateral hearing loss, unspecified hearing loss type  - Referral to Audiology    19. Fatty liver    20. Essential hypertension    21. Elevated ALT measurement    22. 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 Colon, diverticulosis    24. Cervical spinal stenosis    25. Calculus of gallbladder without cholecystitis without obstruction    26. Basal cell carcinoma (BCC) of skin of nose    Other orders  - Omega-3 Fatty Acids (FISH OIL) 1000 MG Cap capsule; Take 1,000 mg by mouth 3 times a day with meals.  - Ascorbic Acid (VITAMIN C PO); Take  by mouth.  - Cholecalciferol (VITAMIN D-3  PO); Take  by mouth.  - Misc Natural Products (GLUCOSAMINE CHONDROITIN ADV PO); Take  by mouth.      Services suggested: No services needed at this time  Health Care Screening: Age-appropriate preventive services recommended by USPTF and ACIP covered by Medicare were discussed today. Services ordered if indicated and agreed upon by the patient.  Referrals offered: Community-based lifestyle interventions to reduce health risks and promote self-management and wellness, fall prevention, nutrition, physical activity, tobacco-use cessation, weight loss, and mental health services as per orders if indicated.    Discussion today about general wellness and lifestyle habits:    Prevent falls and reduce trip hazards; Cautioned about securing or removing rugs.  Have a working fire alarm and carbon monoxide detector;   Engage in regular physical activity and social activities         Problem   Current Mild Episode of Major Depressive Disorder Without Prior Episode (Hcc)    Had a lot of medical conditions, last year, experiencing some depression symptoms and sleep problems.     Hypertriglyceridemia    Takes omega-3 fatty acid.  Currently not on statins.  LDL is at goal.  Triglyceride close her numbers are improving.    Lab Results   Component Value Date/Time    CHOLSTRLTOT 90 (L) 06/19/2025 0937    TRIGLYCERIDE 150 (H) 06/19/2025 0937    HDL 26 (A) 06/19/2025 0937    LDL 34 06/19/2025 0937         Elevated Alt Measurement    Has fatty liver, was taking Excedrin before.  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    Had radiation treatment before.  Follow-up with oncology for monitoring     Igm Lambda Monoclonal Gammopathy    Reviewed oncology notes.  MGUS is minimal  diagnosis, however will need to rule out Waldenström's macroglobulinemia as well  Continue to follow-up with oncology for monitoring     Cervical Spinal Stenosis    Had corpectomy with neurosurgery Dr. Migdalia Merritt.      Kidney Stone    He does have a history of kidney stones in the past. CT showed non-obstructing small left kidney stone. We will monitor for symptoms closely. I will check CT scan if he starts experiencing any symptoms.     Parotid Mass     He was seen in ER and they did CT soft tissue neck on 02/10/2024, which showed prominent 13 into 8 mm nodule in right parotid gland, questionable parotid mass versus intraparotid lymph node.   Patient is status post FNA biopsy with Dr. Bernal back in July 2024 which was negative for malignancy.  However PET scan did show some hypermetabolic uptake.     Ascending Aorta Dilation (Hcc)    Last CT 10/2022 showed Stable ascending aortic ectasia measuring 3.9 cm.   Echo in 09/2024 showed Aorta  The aortic root is dilated with a diameter of 4.0 cm. The ascending   aorta is borderline dilated with a diameter of 3.9 cm.     Other Male Erectile Dysfunction    Viagra as needed for erectile dysfunction.     Fatty Liver     His liver function are getting better his liver function is elevated when compared to the previous numbers. We will hold on to starting cholesterol medication as his cholesterol numbers are good and his liver function are elevated. I recommend to eat healthy diet and exercise and do not drink alcohol.  Recommended not to take Tylenol and Excedrin     Other Insomnia    He reports that he takes Excedrin or Tylenol PM for sleep.  His recent liver function came back elevated.  He is on Zoloft for anxiety symptoms.  Recommended to stop Excedrin and Tylenol PM     Bcc (Basal Cell Carcinoma of Skin)    Has history of basal cell carcinoma, continue to follow-up with dermatology     Bilateral Hearing Loss    Needs new hearing aid.     Microcytic Anemia    Has  history of microcytic anemia, following with oncology for monitoring of IgM lambda monoclonal gammopathy     Essential Hypertension     has history of hypertension, currently on valsartan 320 mg, amlodipine 7.5 mg daily and metoprolol 50 mg daily. No chest pain, palpitations, shortness of breath, lower leg swelling.     Type 2 Diabetes Mellitus With Microalbuminuria, Without Long-Term Current Use of Insulin ([facility])     He is on metformin 500 mg 2 times daily and Jardiance 25 mg daily.      Lab Results   Component Value Date/Time    HBA1C 6.6 (H) 06/19/2025 09:37 AM        Gastroesophageal Reflux Disease Without Esophagitis    Has history of reflux symptoms on omeprazole for 10 years.  EGD 4/4/24 RAZ'S oesophagus, repeat in 3 years     Abdon (Generalized Anxiety Disorder)    Is currently on Zoloft 50 mg daily, doing well with this medication.  No side effects.     Tubular Adenoma    Recent colonoscopy in March 2022 showed 5 polyps, 3 of them were tubular adenoma, repeat recommended in 3 years  Recently had colonoscopy in May 2025 which showed 7 polyps, biopsy test results are pending.  Request these records     Obstructive Sleep Apnea Syndrome    Has obstructive sleep apnea, uses CPAP machine, doing well.     Colon, Diverticulosis    Take fiber in diet to prevent constipation.     Calculus of Gallbladder Without Cholecystitis Without Obstruction    Currently, he does not have any symptoms. If he starts experiencing right upper quadrant pain, then that would be the time to go to the surgeon to get them removed.           Review of Systems   Constitutional:  Positive for malaise/fatigue. Negative for chills and fever.   Cardiovascular:  Negative for chest pain, palpitations and leg swelling.   Psychiatric/Behavioral:  Positive for depression. Negative for suicidal ideas. The patient has insomnia.           Physical Exam  Constitutional:       Appearance: Normal appearance. He is well-developed and well-groomed.   HENT:       Head: Normocephalic and atraumatic.   Eyes:      General:         Right eye: No discharge.         Left eye: No discharge.      Conjunctiva/sclera: Conjunctivae normal.   Cardiovascular:      Rate and Rhythm: Normal rate and regular rhythm.      Heart sounds: Normal heart sounds. No murmur heard.     No friction rub. No gallop.   Pulmonary:      Effort: Pulmonary effort is normal. No respiratory distress.      Breath sounds: Normal breath sounds. No wheezing or rales.   Neurological:      General: No focal deficit present.      Mental Status: He is alert. Mental status is at baseline.      Gait: Gait is intact.   Psychiatric:         Mood and Affect: Mood and affect normal.         Behavior: Behavior normal.              I reviewed with patient recent labs resulted on 6/19/2025.        Problem List Items Addressed This Visit       Ascending aorta dilation (HCC)    Chronic condition, stable, recheck echocardiogram in September of this year.         Relevant Orders    EC-ECHOCARDIOGRAM COMPLETE W/O CONT        Bilateral hearing loss    Chronic condition, unstable, referral to audiology placed         Relevant Orders    Referral to Audiology                Current mild episode of major depressive disorder without prior episode (HCC)    New condition, unstable, start trazodone 50 mg at bedtime         Relevant Medications    traZODone (DESYREL) 50 MG Tab    Other Relevant Orders    Patient has been identified as having a positive depression screening. Appropriate orders and counseling have been given. (Completed)        Elevated ALT measurement    Chronic condition, improving, recommend patient to not take Tylenol PM and Excedrin.  Recheck labs in 4-month         Hypertriglyceridemia    Chronic condition, improving, recheck labs in 4 months.  Continue omega-3         Relevant Orders    Lipid Profile            Lung nodules:  Chronic condition, stable, recheck CT    Relevant Orders    CT-CHEST (THORAX) WITH     Microcytic anemia    Relevant Orders    CBC WITH DIFFERENTIAL        Other insomnia    Chronic condition, unstable, start trazodone 50 mg at bedtime.  Discussed highest dose we can go up to 200 mg.  He is on Zoloft 50 mg daily.  Recommended to watch for serotonin reuptake syndrome         Relevant Medications    traZODone (DESYREL) 50 MG Tab       Follow-up in 4 months for lab follow-up.         [1]   Social History  Tobacco Use    Smoking status: Some Days     Current packs/day: 0.30     Average packs/day: 0.3 packs/day for 49.3 years (14.8 ttl pk-yrs)     Types: Cigarettes     Start date: 4/1/1976    Smokeless tobacco: Never    Tobacco comments:     Have quit and restarted several times.  Most recently about a month ago   Vaping Use    Vaping status: Never Used   Substance Use Topics    Alcohol use: Not Currently     Alcohol/week: 1.2 oz     Types: 2 Cans of beer per week     Comment: Social    Drug use: Never   [2]   Past Surgical History:  Procedure Laterality Date    POSTERIOR CERVICAL FUSION O-ARM  12/17/2024    Procedure: ANTERIOR C5 AND C6 CORPECTOMY, AND POSTERIOR C3-T1 INSTRUMENTED FUSION WITH OARM AND C4-7 LAMINECTOMY;  Surgeon: Migdalia Merritt M.D.;  Location: [facility];  Service: Neurosurgery    CERVICAL DISK AND FUSION ANTERIOR  12/17/2024    Procedure: DISCECTOMY, SPINE, CERVICAL, ANTERIOR APPROACH, WITH FUSION- C4-C5-C6-C7;  Surgeon: Migdalia Merritt M.D.;  Location: [facility];  Service: Neurosurgery    CORPECTOMY  12/17/2024    Procedure: CORPECTOMY, SPINE;  Surgeon: Migdalia Merritt M.D.;  Location: [facility];  Service: Neurosurgery    CERVICAL LAMINECTOMY POSTERIOR  12/17/2024    Procedure: LAMINECTOMY, SPINE, CERVICAL, POSTERIOR APPROACH;  Surgeon: Migdalia Merritt M.D.;  Location: [facility];  Service: Neurosurgery    LAMINOTOMY      LUMBAR DECOMPRESSION

## 2025-07-16 ENCOUNTER — PHARMACY VISIT (OUTPATIENT)
Dept: PHARMACY | Facility: MEDICAL CENTER | Age: 70
End: 2025-07-16
Payer: COMMERCIAL

## 2025-07-21 NOTE — Clinical Note
REFERRAL APPROVAL NOTICE         Sent on July 21, 2025                   Preston Spear  [address] [place] [address]                   Dear Mr. Spear,    After a careful review of the medical information and benefit coverage, [facility] has processed your referral. See below for additional details.    If applicable, you must be actively enrolled with your insurance for coverage of the authorized service. If you have any questions regarding your coverage, please contact your insurance directly.    REFERRAL INFORMATION   Referral #:  [id number]  Referred-To Department    Referred-By Provider:  Audiology    Ryan Carranza M.D.   [facility]      [address]  [address]  [facility] [id number] [phone] [address] [facility] [id number] [phone]    Referral Start Date:  07/15/2025  Referral End Date:   07/15/2026             SCHEDULING  If you do not already have an appointment, please call [phone] to make an appointment.     MORE INFORMATION  If you do not already have a [employer] account, sign up at: [email or web address]  You can access your medical information, make appointments, see lab results, billing information, and more.  If you have questions regarding this referral, please contact  the [facility] Referrals department at:             [phone]. Monday - Friday 8:00AM - 5:00PM.     Sincerely,    [facility]

## 2025-07-22 PROCEDURE — RXMED WILLOW AMBULATORY MEDICATION CHARGE: Performed by: FAMILY MEDICINE

## 2025-07-24 PROCEDURE — RXMED WILLOW AMBULATORY MEDICATION CHARGE: Performed by: FAMILY MEDICINE

## 2025-07-25 ENCOUNTER — PHARMACY VISIT (OUTPATIENT)
Dept: PHARMACY | Facility: MEDICAL CENTER | Age: 70
End: 2025-07-25
Payer: COMMERCIAL

## 2025-07-30 PROCEDURE — RXMED WILLOW AMBULATORY MEDICATION CHARGE

## 2025-08-04 ENCOUNTER — PHARMACY VISIT (OUTPATIENT)
Dept: PHARMACY | Facility: MEDICAL CENTER | Age: 70
End: 2025-08-04
Payer: COMMERCIAL

## 2025-08-06 RX ORDER — DOXEPIN HYDROCHLORIDE 10 MG/1
10 CAPSULE ORAL NIGHTLY
Qty: 100 CAPSULE | Refills: 3 | Status: SHIPPED | OUTPATIENT
Start: 2025-08-06

## 2025-08-16 PROCEDURE — RXMED WILLOW AMBULATORY MEDICATION CHARGE: Performed by: FAMILY MEDICINE

## 2025-08-18 PROCEDURE — RXMED WILLOW AMBULATORY MEDICATION CHARGE: Performed by: FAMILY MEDICINE

## 2025-08-25 ENCOUNTER — HOSPITAL ENCOUNTER (OUTPATIENT)
Dept: LAB | Facility: MEDICAL CENTER | Age: 70
End: 2025-08-25
Attending: NURSE PRACTITIONER
Payer: MEDICARE

## 2025-08-25 ENCOUNTER — PHARMACY VISIT (OUTPATIENT)
Dept: PHARMACY | Facility: MEDICAL CENTER | Age: 70
End: 2025-08-25
Payer: COMMERCIAL

## 2025-08-25 DIAGNOSIS — D47.2 IGM LAMBDA MONOCLONAL GAMMOPATHY: ICD-10-CM

## 2025-08-25 LAB
ALBUMIN SERPL BCP-MCNC: 4.3 G/DL (ref 3.2–4.9)
ALBUMIN/GLOB SERPL: 1.4 G/DL
ALP SERPL-CCNC: 68 U/L (ref 30–99)
ALT SERPL-CCNC: 63 U/L (ref 2–50)
ANION GAP SERPL CALC-SCNC: 14 MMOL/L (ref 7–16)
ANISOCYTOSIS BLD QL SMEAR: ABNORMAL
AST SERPL-CCNC: 35 U/L (ref 12–45)
BASOPHILS # BLD AUTO: 1.7 % (ref 0–1.8)
BASOPHILS # BLD: 0.11 K/UL (ref 0–0.12)
BILIRUB SERPL-MCNC: 0.4 MG/DL (ref 0.1–1.5)
BUN SERPL-MCNC: 19 MG/DL (ref 8–22)
CALCIUM ALBUM COR SERPL-MCNC: 9.1 MG/DL (ref 8.5–10.5)
CALCIUM SERPL-MCNC: 9.3 MG/DL (ref 8.5–10.5)
CHLORIDE SERPL-SCNC: 103 MMOL/L (ref 96–112)
CO2 SERPL-SCNC: 20 MMOL/L (ref 20–33)
COMMENT NL1176: NORMAL
CREAT SERPL-MCNC: 1.32 MG/DL (ref 0.5–1.4)
EOSINOPHIL # BLD AUTO: 0.11 K/UL (ref 0–0.51)
EOSINOPHIL NFR BLD: 1.7 % (ref 0–6.9)
ERYTHROCYTE [DISTWIDTH] IN BLOOD BY AUTOMATED COUNT: 43.9 FL (ref 35.9–50)
GFR SERPLBLD CREATININE-BSD FMLA CKD-EPI: 57 ML/MIN/1.73 M 2
GLOBULIN SER CALC-MCNC: 3.1 G/DL (ref 1.9–3.5)
GLUCOSE SERPL-MCNC: 141 MG/DL (ref 65–99)
HCT VFR BLD AUTO: 39.6 % (ref 42–52)
HGB BLD-MCNC: 10.8 G/DL (ref 14–18)
HYPOCHROMIA BLD QL SMEAR: ABNORMAL
LYMPHOCYTES # BLD AUTO: 1.61 K/UL (ref 1–4.8)
LYMPHOCYTES NFR BLD: 25.6 % (ref 22–41)
MANUAL DIFF BLD: NORMAL
MCH RBC QN AUTO: 17.3 PG (ref 27–33)
MCHC RBC AUTO-ENTMCNC: 27.3 G/DL (ref 32.3–36.5)
MCV RBC AUTO: 63.3 FL (ref 81.4–97.8)
MICROCYTES BLD QL SMEAR: ABNORMAL
MONOCYTES # BLD AUTO: 0.9 K/UL (ref 0–0.85)
MONOCYTES NFR BLD AUTO: 13.7 % (ref 0–13.4)
MORPHOLOGY BLD-IMP: NORMAL
NEUTROPHILS # BLD AUTO: 3.61 K/UL (ref 1.82–7.42)
NEUTROPHILS NFR BLD: 57.3 % (ref 44–72)
NRBC # BLD AUTO: 0 K/UL
NRBC BLD-RTO: 0 /100 WBC (ref 0–0.2)
OVALOCYTES BLD QL SMEAR: NORMAL
PLATELET # BLD AUTO: 275 K/UL (ref 164–446)
PLATELET BLD QL SMEAR: NORMAL
PMV BLD AUTO: 9.9 FL (ref 9–12.9)
POIKILOCYTOSIS BLD QL SMEAR: NORMAL
POTASSIUM SERPL-SCNC: 4.9 MMOL/L (ref 3.6–5.5)
PROT SERPL-MCNC: 7.4 G/DL (ref 6–8.2)
RBC # BLD AUTO: 6.26 M/UL (ref 4.7–6.1)
RBC BLD AUTO: PRESENT
SODIUM SERPL-SCNC: 137 MMOL/L (ref 135–145)
WBC # BLD AUTO: 6.3 K/UL (ref 4.8–10.8)

## 2025-08-25 PROCEDURE — 85007 BL SMEAR W/DIFF WBC COUNT: CPT

## 2025-08-25 PROCEDURE — 82784 ASSAY IGA/IGD/IGG/IGM EACH: CPT

## 2025-08-25 PROCEDURE — 85027 COMPLETE CBC AUTOMATED: CPT

## 2025-08-25 PROCEDURE — 36415 COLL VENOUS BLD VENIPUNCTURE: CPT

## 2025-08-25 PROCEDURE — 83521 IG LIGHT CHAINS FREE EACH: CPT

## 2025-08-25 PROCEDURE — 84165 PROTEIN E-PHORESIS SERUM: CPT

## 2025-08-25 PROCEDURE — 80053 COMPREHEN METABOLIC PANEL: CPT

## 2025-08-25 PROCEDURE — 84155 ASSAY OF PROTEIN SERUM: CPT | Mod: 59

## 2025-08-25 PROCEDURE — 86334 IMMUNOFIX E-PHORESIS SERUM: CPT

## 2025-08-26 ENCOUNTER — RESULTS FOLLOW-UP (OUTPATIENT)
Dept: HEMATOLOGY ONCOLOGY | Facility: MEDICAL CENTER | Age: 70
End: 2025-08-26
Payer: MEDICARE

## 2025-08-29 LAB
ALBUMIN SERPL ELPH-MCNC: 4.15 G/DL (ref 3.75–5.01)
ALPHA1 GLOB SERPL ELPH-MCNC: 0.26 G/DL (ref 0.19–0.46)
ALPHA2 GLOB SERPL ELPH-MCNC: 0.96 G/DL (ref 0.48–1.05)
B-GLOBULIN SERPL ELPH-MCNC: 0.91 G/DL (ref 0.48–1.1)
EER MONOCLONAL PROTEIN AND FLC, SERUM Q5224: ABNORMAL
GAMMA GLOB SERPL ELPH-MCNC: 1.02 G/DL (ref 0.62–1.51)
IGA SERPL-MCNC: 185 MG/DL (ref 68–408)
IGG SERPL-MCNC: 609 MG/DL (ref 768–1632)
IGM SERPL-MCNC: 583 MG/DL (ref 35–263)
INTERPRETATION SERPL IFE-IMP: ABNORMAL
INTERPRETATION SERPL IFE-IMP: ABNORMAL
KAPPA LC FREE SER-MCNC: 27.54 MG/L (ref 3.3–19.4)
KAPPA LC FREE/LAMBDA FREE SER NEPH: 1.07 {RATIO} (ref 0.26–1.65)
LAMBDA LC FREE SERPL-MCNC: 25.81 MG/L (ref 5.71–26.3)
MONOCLONAL PROTEIN NL11656: 0.54 G/DL
PROT SERPL-MCNC: 7.3 G/DL (ref 6.3–8.2)

## (undated) DEVICE — APPLICATOR SURGIFLO - (6EA/BX)

## (undated) DEVICE — ANTI-FOG SOLUTION - 60BTL/CA

## (undated) DEVICE — SUTURE 3-0 VICRYL PLUS RB-1 - 8 X 18 INCH (12/BX)

## (undated) DEVICE — DERMABOND ADVANCED - (12EA/BX)

## (undated) DEVICE — TRAY SURESTEP FOLEY TEMP SENSING 16FR SNAP SECURE(10EA/CA) ORDER #18764 FOR TEMP FOLEY ONLY

## (undated) DEVICE — SLEEVE VASO DVT COMPRESSION CALF MED - (10PR/CA)

## (undated) DEVICE — TOOL MR8 14CM MATCH HD SYM-TRI 3MM DIAMETER (1/EA)

## (undated) DEVICE — COVER MAYO STAND X-LG - (22EA/CA)

## (undated) DEVICE — TUBING C&T SET FLYING LEADS DRAIN TUBING (10EA/BX)

## (undated) DEVICE — PENCIL ELECTSURG 10FT BTN SWH - (50/CA)

## (undated) DEVICE — DEVICE MONOPOLAR RF PEAK PLASMABLADE 3.0S

## (undated) DEVICE — DRESSING TRANSPARENT FILM TEGADERM 4 X 4.75" (50EA/BX)"

## (undated) DEVICE — NEEDLE SPINAL NON-SAFETY 18 GA X 3 IN (25EA/BX)

## (undated) DEVICE — KIT EVACUATER 3 SPRING PVC LF 1/8 DRAIN SIZE (10EA/CA)"

## (undated) DEVICE — BLANKET WARMING FULL BODY - (10/CA)

## (undated) DEVICE — SPONGE PEANUT - (5/PK 50PK/CA)

## (undated) DEVICE — RESTRAINTS LIMB DISP. - (12/BX 4BX/CA)

## (undated) DEVICE — SENSOR OXIMETER ADULT SPO2 RD SET (20EA/BX)

## (undated) DEVICE — CATHETER IV 14 GA X 2 ---SURG.& SDS ONLY---(200EA/CA)

## (undated) DEVICE — FORCEPS IRRIGATING 8 X 1.5MM (5EA/BX)

## (undated) DEVICE — SUTURE 4-0 PROLENE RB-1 D/A 36 (36PK/BX)"

## (undated) DEVICE — SHEET PEDIATRIC LAPAROTOMY - (10/CA)

## (undated) DEVICE — GLOVE BIOGEL PI INDICATOR SZ 6.0 SURGICAL PF LF -(200PR/CA)

## (undated) DEVICE — DRAPE MICROSCOPE ARMATEC 120IN X 46IN (10EA/CA)

## (undated) DEVICE — LACTATED RINGERS INJ 1000 ML - (14EA/CA 60CA/PF)

## (undated) DEVICE — PIN HEAD MAYFIELD DISP. (3EA/PK 12PK/BX)

## (undated) DEVICE — GLOVE SZ 7.5 BIOGEL PI MICRO - PF LF (50PR/BX)

## (undated) DEVICE — KIT SURGIFLO W/OUT THROMBIN - (6EA/BX)

## (undated) DEVICE — BOVIE BLADE COATED &INSULATED - 25/PK

## (undated) DEVICE — GLOVE BIOGEL SZ 7 SURGICAL PF LTX - (50PR/BX 4BX/CA)

## (undated) DEVICE — SUTURE GENERAL

## (undated) DEVICE — SPHERE NAVIGATION STEALTH (5EA/TY 12TY/PK)

## (undated) DEVICE — DRAPE 36X28IN RAD CARM BND BG - (25/CA) O

## (undated) DEVICE — SUTURE 2-0 ETHILON FS - (36/BX) 18 INCH

## (undated) DEVICE — RESERVOIR SUCTION 100 CC - SILICONE (20EA/CA)

## (undated) DEVICE — TOWELS CLOTH SURGICAL - (4/PK 20PK/CA)

## (undated) DEVICE — TOOL MR8 15CM MATCH HEAD 2.2MM DIAMETER (1/EA)

## (undated) DEVICE — SET LEADWIRE 5 LEAD BEDSIDE DISPOSABLE ECG (1SET OF 5/EA)

## (undated) DEVICE — MIDAS LUBRICATOR DIFFUSER PACK (4EA/CA)

## (undated) DEVICE — SODIUM CHL IRRIGATION 0.9% 1000ML (12EA/CA)

## (undated) DEVICE — SUTURE 0 VICRYL PLUS CT-1 - 8 X 18 INCH (12/BX)

## (undated) DEVICE — DRAPE LARGE 3 QUARTER - (20/CA)

## (undated) DEVICE — SUTURE 2-0 VICRYL CT-2 8 X 18 INCH (12EA/BX)

## (undated) DEVICE — DRAPESURG STERI-DRAPE LONG - (10/BX 4BX/CA)

## (undated) DEVICE — PACK NEURO - (2EA/CA)

## (undated) DEVICE — CHLORAPREP 26 ML APPLICATOR - ORANGE TINT(25/CA)

## (undated) DEVICE — GLOVE BIOGEL SZ 7.5 SURGICAL PF LTX - (50PR/BX 4BX/CA)

## (undated) DEVICE — COVER LIGHT HANDLE ALC PLUS DISP (18EA/BX)

## (undated) DEVICE — BAG SPONGE COUNT 10.25 X 32 - BLUE (250/CA)

## (undated) DEVICE — SUTURE 4-0 MONOCRYL PLUS PS-2 - 27 INCH (36/BX)

## (undated) DEVICE — SCREW DISTRACTION 14MM YELLOW - STERILE (10EA/BX) (5TX4=20)

## (undated) DEVICE — BONE MILL BM210

## (undated) DEVICE — INTRAOP NEURO IN OR 1:1 PER 15 MIN

## (undated) DEVICE — GOWN SURGEONS LARGE - (32/CA)

## (undated) DEVICE — SOD. CHL. INJ. 0.9% 250 ML - (36/CA 50CA/PF)

## (undated) DEVICE — SYRINGE 20 ML LL (50EA/BX 4BX/CA)

## (undated) DEVICE — DRAIN J-VAC 7MM FLAT - (10EA/CA)

## (undated) DEVICE — ELECTRODE DUAL RETURN W/ CORD - (50/PK)

## (undated) DEVICE — TUBE EMG NIM TRIVANTAGE 7MM (3EA/PK)

## (undated) DEVICE — GLOVE BIOGEL PI INDICATOR SZ 7.0 SURGICAL PF LF - (50/BX 4BX/CA)

## (undated) DEVICE — GLOVE BIOGEL INDICATOR SZ 7SURGICAL PF LTX - (50/BX 4BX/CA)

## (undated) DEVICE — TUBE CONNECT SUCTION CLEAR 120 X 1/4" (50EA/CA)"